# Patient Record
Sex: MALE | Race: BLACK OR AFRICAN AMERICAN | NOT HISPANIC OR LATINO | Employment: FULL TIME | URBAN - METROPOLITAN AREA
[De-identification: names, ages, dates, MRNs, and addresses within clinical notes are randomized per-mention and may not be internally consistent; named-entity substitution may affect disease eponyms.]

---

## 2023-04-20 LAB
APPEARANCE, URINE: ABNORMAL
BACTERIA, URINE: ABNORMAL /HPF
BILIRUBIN, URINE: NEGATIVE
BLOOD, URINE: ABNORMAL
COLOR, URINE: ABNORMAL
GLUCOSE, URINE: NEGATIVE MG/DL
KETONES, URINE: NEGATIVE MG/DL
LEUKOCYTE ESTERASE, URINE: ABNORMAL
MUCUS, URINE: ABNORMAL /LPF
NITRITE, URINE: POSITIVE
PH, URINE: 7 (ref 5–8)
PROTEIN, URINE: ABNORMAL MG/DL
RBC, URINE: 459 /HPF (ref 0–5)
SPECIFIC GRAVITY, URINE: 1.01 (ref 1–1.03)
UROBILINOGEN, URINE: <2 MG/DL (ref 0–1.9)
WBC CLUMPS, URINE: ABNORMAL /HPF
WBC, URINE: 426 /HPF (ref 0–5)

## 2023-04-22 LAB
URINE CULTURE: ABNORMAL
URINE CULTURE: ABNORMAL

## 2023-09-03 LAB
APPEARANCE, URINE: ABNORMAL
BILIRUBIN, URINE: NEGATIVE
BLOOD, URINE: ABNORMAL
COLOR, URINE: ABNORMAL
GLUCOSE, URINE: NEGATIVE MG/DL
KETONES, URINE: ABNORMAL MG/DL
LEUKOCYTE ESTERASE, URINE: ABNORMAL
NITRITE, URINE: POSITIVE
PH, URINE: 6 (ref 5–8)
PROTEIN, URINE: ABNORMAL MG/DL
RBC, URINE: ABNORMAL /HPF (ref 0–5)
SPECIFIC GRAVITY, URINE: 1.03 (ref 1–1.03)
UROBILINOGEN, URINE: 4 MG/DL (ref 0–1.9)
WBC, URINE: ABNORMAL /HPF (ref 0–5)

## 2023-10-04 PROBLEM — C61 PROSTATE CANCER (MULTI): Status: ACTIVE | Noted: 2023-10-04

## 2023-10-08 PROBLEM — N40.0 BPH (BENIGN PROSTATIC HYPERPLASIA): Status: ACTIVE | Noted: 2023-10-08

## 2023-10-08 PROBLEM — R31.9 HEMATURIA: Status: ACTIVE | Noted: 2023-10-08

## 2023-10-08 PROBLEM — Z97.8 CHRONIC INDWELLING FOLEY CATHETER: Status: ACTIVE | Noted: 2023-10-08

## 2023-10-08 PROBLEM — I10 HYPERTENSION: Status: ACTIVE | Noted: 2023-10-08

## 2023-10-08 PROBLEM — K62.5 RECTAL BLEEDING: Status: ACTIVE | Noted: 2023-10-08

## 2023-10-08 PROBLEM — R10.2 SUPRAPUBIC PAIN, ACUTE: Status: ACTIVE | Noted: 2023-10-08

## 2023-10-08 PROBLEM — R33.9 RETENTION OF URINE: Status: ACTIVE | Noted: 2023-10-08

## 2023-10-08 PROBLEM — T83.9XXA URINARY CATHETER COMPLICATION (CMS-HCC): Status: ACTIVE | Noted: 2023-10-08

## 2023-10-08 PROBLEM — R97.20 ELEVATED PSA: Status: ACTIVE | Noted: 2023-10-08

## 2023-10-08 PROBLEM — I10 BENIGN ESSENTIAL HYPERTENSION: Status: ACTIVE | Noted: 2023-10-08

## 2023-10-08 PROBLEM — R93.89 ABNORMAL MRI: Status: ACTIVE | Noted: 2023-10-08

## 2023-10-08 PROBLEM — R33.9 URINARY RETENTION WITH INCOMPLETE BLADDER EMPTYING: Status: ACTIVE | Noted: 2023-10-08

## 2023-10-08 PROBLEM — I48.92 ATRIAL FLUTTER (MULTI): Status: ACTIVE | Noted: 2023-10-08

## 2023-10-08 PROBLEM — R30.0 DYSURIA: Status: ACTIVE | Noted: 2023-10-08

## 2023-10-08 PROBLEM — R39.89 ABNORMAL PROSTATE EXAM: Status: ACTIVE | Noted: 2023-10-08

## 2023-10-08 RX ORDER — LISINOPRIL 10 MG/1
1 TABLET ORAL DAILY
COMMUNITY

## 2023-10-08 RX ORDER — FAMOTIDINE 20 MG/1
1 TABLET, FILM COATED ORAL DAILY
COMMUNITY
Start: 2023-09-03

## 2023-10-08 RX ORDER — AMLODIPINE BESYLATE 5 MG/1
1 TABLET ORAL DAILY
COMMUNITY

## 2023-10-08 RX ORDER — OMEGA-3 FATTY ACIDS/FISH OIL 340-1000MG
1 CAPSULE ORAL DAILY
COMMUNITY

## 2023-10-08 RX ORDER — SULFAMETHOXAZOLE AND TRIMETHOPRIM 800; 160 MG/1; MG/1
1 TABLET ORAL EVERY 12 HOURS
COMMUNITY
Start: 2022-09-20

## 2023-10-08 RX ORDER — OXYCODONE AND ACETAMINOPHEN 5; 325 MG/1; MG/1
1 TABLET ORAL EVERY 8 HOURS PRN
COMMUNITY
Start: 2022-10-10

## 2023-10-08 RX ORDER — SODIUM CHLORIDE 0.9 G/100ML
IRRIGANT IRRIGATION
COMMUNITY
Start: 2022-08-10

## 2023-10-08 RX ORDER — TAMSULOSIN HYDROCHLORIDE 0.4 MG/1
CAPSULE ORAL
COMMUNITY
Start: 2022-12-30 | End: 2024-03-15 | Stop reason: SDUPTHER

## 2023-10-08 RX ORDER — AMLODIPINE BESYLATE 10 MG/1
1 TABLET ORAL DAILY
COMMUNITY
Start: 2022-05-03

## 2023-10-08 RX ORDER — BICALUTAMIDE 50 MG/1
TABLET, FILM COATED ORAL
COMMUNITY

## 2023-10-08 RX ORDER — LANOLIN ALCOHOL/MO/W.PET/CERES
1 CREAM (GRAM) TOPICAL DAILY
COMMUNITY

## 2023-10-08 RX ORDER — DIPHENHYDRAMINE HCL 25 MG
25 TABLET ORAL EVERY 6 HOURS PRN
COMMUNITY
Start: 2023-09-03

## 2023-10-08 RX ORDER — LISINOPRIL 40 MG/1
1 TABLET ORAL DAILY
COMMUNITY
Start: 2022-05-03

## 2023-10-10 ENCOUNTER — TELEPHONE (OUTPATIENT)
Dept: HEMATOLOGY/ONCOLOGY | Facility: HOSPITAL | Age: 68
End: 2023-10-10
Payer: COMMERCIAL

## 2023-10-26 ENCOUNTER — TELEPHONE (OUTPATIENT)
Dept: RADIATION ONCOLOGY | Facility: HOSPITAL | Age: 68
End: 2023-10-26
Payer: COMMERCIAL

## 2023-10-26 NOTE — TELEPHONE ENCOUNTER
Called pt. to remind of appointment on 10/30/2023 at 1:00 pm with SG Alford. Pt's phone went to voicemail left number if needs to reschedule.

## 2024-01-11 ENCOUNTER — TELEPHONE (OUTPATIENT)
Dept: RADIATION ONCOLOGY | Facility: HOSPITAL | Age: 69
End: 2024-01-11
Payer: COMMERCIAL

## 2024-01-11 NOTE — TELEPHONE ENCOUNTER
Called pt. to remind of appointment on 1/16/2024 at 9:30 am with SG Alford.  Pt answered and confirmed appointment.

## 2024-01-12 ENCOUNTER — APPOINTMENT (OUTPATIENT)
Dept: RADIATION ONCOLOGY | Facility: HOSPITAL | Age: 69
End: 2024-01-12
Payer: COMMERCIAL

## 2024-01-16 ENCOUNTER — TELEPHONE (OUTPATIENT)
Dept: RADIATION ONCOLOGY | Facility: HOSPITAL | Age: 69
End: 2024-01-16
Payer: COMMERCIAL

## 2024-03-14 ENCOUNTER — TELEPHONE (OUTPATIENT)
Dept: RADIATION ONCOLOGY | Facility: HOSPITAL | Age: 69
End: 2024-03-14
Payer: COMMERCIAL

## 2024-03-14 NOTE — PROGRESS NOTES
Cancer synopsis:  Rad/onc: Dr. Barrett/ Rocío CNP  Urologist: Valeria Mireles    68 -year-old male with node positive prostate cancer, cT3b cN1 cM1a, stage IVB [place] score 4+5=9 (Grade  group 5), iPSA 40.78.   He initially presented to elevated PSA at [facility]. He underwent a biopsy which demonstrated Tommy score 4+5=9, MRI of the prostate showed + YANE, + SV involvement, and + pelvic lymph node adenopathy.  His bone scan was negative for osseous metastases.   PSMA PET/CT demonstrated disease in the prostate, pelvic and para-aortic LAD.  The patient has been under the care of medical oncology who initiated bicalutamide on 4/18/2022 with initiation of ADT on 4/26/2022 with a 3-month injection.  The patient  has discussed previously escalation with abiraterone/prednisone.  The patient presents today for discussion of consolidative radiation therapy after response to ADT.  PSA trend:  4/18/2022-52.69  5/2/2022-11.84  5/16/2022-2.89  7/18/2022-0.84  Testosterone level:  7/18/2022-less than 60     VMAT to prostate w/ prostate boost and pelvis completed 08/31/2022. Currently on ADT every 3 months for 2 years.    History of presenting illness:    Patient ID: [id number]     Paul Bruno is a 68 y.o. male who presents for his locally advance high risk prostate cancer GG5, IPSA 40.78 cT3b cN1 cM1a now s/p RT and on lng-term lupron.    RT Site: prostate, pelvis  RT Date: 08/31/2022  Hormone therapy: yes, lng term ADT started 04/18/2022 (patient stopped hormonal therapy on his own  8m last injection over)  -Hot Flushes: Admits to occasional hot flush that has been improving since stopping hormonal therapy.  Fatigue: Denies  Bone pain: Denies  KEVIN: n/a virtual  IPSS: n/a virtual  Urinary symptoms: Denies recent episodes of hematuria or clots. Reports able to urinate throughout the day however at night occasionally has to straight catheterize. Did meet with urology and was offered Holep for urinary stricture. Did have about a  40 percent chance  of becoming incontinent and declined. Denies dysuria, urine leakage. Does report weak stream and incomplete bladder emptying. Had been using flowmax in the past but has stopped since stopping with urology appointments.  Urinary Medications: No  Rectal bleeding: Denies  Other systems: Denies      Review of systems:  Review of Systems   Constitutional:  Negative for fatigue, fever and unexpected weight change.   Respiratory:  Negative for cough, chest tightness and shortness of breath.    Cardiovascular:  Negative for chest pain, palpitations and leg swelling.   Gastrointestinal:  Negative for abdominal pain, anal bleeding, blood in stool, constipation, diarrhea and rectal pain.   Endocrine: Negative for cold intolerance, heat intolerance and polyuria.   Genitourinary:  Positive for difficulty urinating. Negative for decreased urine volume, dysuria, frequency, hematuria and urgency.   Musculoskeletal:  Negative for arthralgias, back pain, gait problem and joint swelling.   Skin: Negative.    Allergic/Immunologic: Negative.    Neurological:  Negative for dizziness, syncope and weakness.   Psychiatric/Behavioral: Negative.         Past Medical history  No past medical history on file.     Surgical/family history  Family History   Problem Relation Name Age of Onset    Hypertension Other grandparent       Past Surgical History:   Procedure Laterality Date    OTHER SURGICAL HISTORY  07/27/2022    Prostate needle biopsy        Social History  Tobacco Use: High Risk (10/30/2023)    Patient History     Smoking Tobacco Use: Every Day     Smokeless Tobacco Use: Never     Passive Exposure: Not on file         Current med list:  Current Outpatient Medications   Medication Instructions    amLODIPine (Norvasc) 10 mg tablet 1 tablet, oral, Daily    amLODIPine (Norvasc) 5 mg tablet 1 tablet, oral, Daily    ascorbic acid, vitamin C, 1,000 mg ER tablet 1 tablet, oral, Daily    bicalutamide (Casodex) 50 mg tablet  Bicalutamide 50 MG Oral Tablet  Refills: 0    cyanocobalamin (Vitamin B-12) 1,000 mcg tablet 1 tablet, oral, Daily    diphenhydrAMINE (BENADRYL ALLERGY) 25 mg, oral, Every 6 hours PRN    famotidine (Pepcid) 20 mg tablet 1 tablet, oral, Daily    lisinopril 10 mg tablet 1 tablet, oral, Daily    lisinopril 40 mg tablet 1 tablet, oral, Daily    omega-3 fatty acids-fish oil (Fish OiL) 340-1,000 mg capsule 1 capsule, oral, Daily    oxyCODONE-acetaminophen (Percocet) 5-325 mg tablet 1 tablet, oral, Every 8 hours PRN,  as needed cancer associated pain g89.3<BR>    sodium chloride 0.9 % irrigation solution  USE AS DIRECTED.<BR>    sulfamethoxazole-trimethoprim (Bactrim DS) 800-160 mg tablet 1 tablet, oral, Every 12 hours    tamsulosin (Flomax) 0.4 mg 24 hr capsule Take 1 capsule daily by mouth.    TURMERIC ORAL 1,000 mg, oral, Daily    vitamin E acetate (VITAMIN E ORAL) 1 capsule, oral, Daily, vitamin E 180 mg oral capsule        Last recorded vital:  N/a virtual    Physical exam  N/a virtual    Pertinent labs:  PSA   Date/Time Value Ref Range Status   03/28/2023 10:07 AM 0.23 0.00 - 4.00 ng/mL Final     Comment:     The FDA requires that the method used for PSA assay be   reported to the physician. Values obtained with different   assay methods must not be used interchangeably. This test   was performed at [facility] using the Siemens  Opiatalk PSA method, which is a sandwich immunoassay using   chemiluminescence for quantitation. The assay is approved  for measurement of prostate-specific antigen (PSA) in   serum and may be used in conjunction with a digital rectal  examination in men 50 years and older as an aid in   detection of prostate cancer.   5-Alpha-reductase inhibitors (e.g. Proscar, Finasteride,   Avodart, Dutasteride and Isabell) for the treatment of BPH   have been shown to lower PSA levels by an average of 50%   after 6 months of treatment.           Dx:  Problem List Items Addressed This Visit   "  None  Visit Diagnoses       Malignant neoplasm of prostate (CMS/HCC)    -  Primary    Relevant Orders    PSA, Total and Free    Testosterone    Comprehensive Metabolic Panel    CBC and Auto Differential    Clinic Appointment Request Virtual Est; [facility] (virtual); [facility] [address] [facility] (virtual)    Weak urinary stream        Relevant Medications    tamsulosin (Flomax) 0.4 mg 24 hr capsule           Reviewed need for labs today. Review of latent SE including rectal bleeding, hematuria, urinary strictures, ED where reviewed as well as how to contact office if s/s present. Does report ongoing weak stream and some incomplete bladder emptying latent SE. NCCN guidelines where reviewed and routine FUV of every 3m for first year and every 6m for four years for a total of five years was discussed. Patient verbalized understanding.      Patient has stopped hormonal therapy. Reports stopping after last visit with med/onc. States he \"just stopped going\". Has not had any lab since then. Has also not seen urology or taking flowmax for urinary symptoms but straight catheterizing instead most evenings. Discussed importance for high risk prostate cancer for lng-term hormonal therapy based on current evidence showing improved disease free progression and lower recurrence rates. Declined FUV with med/onc to discuss further. Discussed at minimum obtaining PSA and Testerone every 3m to evaluate for disease recurrence.     Reviewed ongoing urinary stream related issues, discussed how flowmax can increase stream for males being effected by urinary strictures and BPH symptoms therefore potential reducing the frequency and need to straight cath. Did review red flag symptom of obstruction and need to report to ED if this occurs.    PLAN:  FUV 3m (depending on what is going on with med/onc and lupron?)  Labs Due for CBC/CMP/Psa and testerone  Imaging none  Flowmax (sent to [facility] in [place])  FUV other providers: PCP for routine " evals        Please contact office with any concerns:  Modesto Perry CNP  [phone]    I performed this visit using realtime telehealth tools, including an audio/video OR telephone connection between  patient’s name and location Paul Bruno and Modesto Alford CNP.      1. POS 02: Telehealth provided other than in patient's home.  o Patient is not located in their home when receiving health services or health  related services through telecommunication technology.

## 2024-03-14 NOTE — TELEPHONE ENCOUNTER
Called pt. to remind of appointment on 3/15/2024 at 9:30 am with SG Alford.  Pt answered and confirmed appointment.

## 2024-03-15 ENCOUNTER — HOSPITAL ENCOUNTER (OUTPATIENT)
Dept: RADIATION ONCOLOGY | Facility: HOSPITAL | Age: 69
Setting detail: RADIATION/ONCOLOGY SERIES
Discharge: HOME | End: 2024-03-15
Payer: COMMERCIAL

## 2024-03-15 DIAGNOSIS — R39.12 WEAK URINARY STREAM: ICD-10-CM

## 2024-03-15 DIAGNOSIS — R33.9 URINARY RETENTION WITH INCOMPLETE BLADDER EMPTYING: Primary | ICD-10-CM

## 2024-03-15 DIAGNOSIS — C61 MALIGNANT NEOPLASM OF PROSTATE (MULTI): ICD-10-CM

## 2024-03-15 PROCEDURE — 99214 OFFICE O/P EST MOD 30 MIN: CPT

## 2024-03-15 RX ORDER — TAMSULOSIN HYDROCHLORIDE 0.4 MG/1
CAPSULE ORAL
Qty: 30 CAPSULE | Refills: 3 | Status: SHIPPED | OUTPATIENT
Start: 2024-03-15

## 2024-03-15 ASSESSMENT — ENCOUNTER SYMPTOMS
COUGH: 0
DIARRHEA: 0
FATIGUE: 0
ANAL BLEEDING: 0
DIFFICULTY URINATING: 1
UNEXPECTED WEIGHT CHANGE: 0
BACK PAIN: 0
DYSURIA: 0
WEAKNESS: 0
FEVER: 0
BLOOD IN STOOL: 0
ABDOMINAL PAIN: 0
PSYCHIATRIC NEGATIVE: 1
SHORTNESS OF BREATH: 0
CONSTIPATION: 0
PALPITATIONS: 0
JOINT SWELLING: 0
FREQUENCY: 0
ARTHRALGIAS: 0
HEMATURIA: 0
CHEST TIGHTNESS: 0
ALLERGIC/IMMUNOLOGIC NEGATIVE: 1
RECTAL PAIN: 0
DIZZINESS: 0

## 2024-03-18 NOTE — ADDENDUM NOTE
Encounter addended by: JAKI Mcneill-SG on: 3/18/2024 12:48 PM   Actions taken: Visit diagnoses modified, Order list changed, Diagnosis association updated

## 2024-03-25 ENCOUNTER — TELEPHONE (OUTPATIENT)
Dept: HEMATOLOGY/ONCOLOGY | Facility: HOSPITAL | Age: 69
End: 2024-03-25
Payer: COMMERCIAL

## 2024-06-10 ENCOUNTER — LAB (OUTPATIENT)
Dept: LAB | Facility: HOSPITAL | Age: 69
End: 2024-06-10
Payer: COMMERCIAL

## 2024-06-10 DIAGNOSIS — C61 MALIGNANT NEOPLASM OF PROSTATE (MULTI): ICD-10-CM

## 2024-06-10 DIAGNOSIS — C61 PROSTATE CANCER (MULTI): ICD-10-CM

## 2024-06-10 DIAGNOSIS — C61 PROSTATE CANCER (MULTI): Primary | ICD-10-CM

## 2024-06-10 LAB
ALBUMIN SERPL BCP-MCNC: 4.3 G/DL (ref 3.4–5)
ALP SERPL-CCNC: 71 U/L (ref 33–136)
ALT SERPL W P-5'-P-CCNC: 14 U/L (ref 10–52)
ANION GAP SERPL CALC-SCNC: 12 MMOL/L (ref 10–20)
AST SERPL W P-5'-P-CCNC: 16 U/L (ref 9–39)
BASOPHILS # BLD AUTO: 0.09 X10*3/UL (ref 0–0.1)
BASOPHILS NFR BLD AUTO: 0.9 %
BILIRUB SERPL-MCNC: 0.5 MG/DL (ref 0–1.2)
BUN SERPL-MCNC: 12 MG/DL (ref 6–23)
CALCIUM SERPL-MCNC: 10.1 MG/DL (ref 8.6–10.3)
CHLORIDE SERPL-SCNC: 104 MMOL/L (ref 98–107)
CO2 SERPL-SCNC: 29 MMOL/L (ref 21–32)
CREAT SERPL-MCNC: 0.87 MG/DL (ref 0.5–1.3)
EGFRCR SERPLBLD CKD-EPI 2021: >90 ML/MIN/1.73M*2
EOSINOPHIL # BLD AUTO: 0.49 X10*3/UL (ref 0–0.4)
EOSINOPHIL NFR BLD AUTO: 4.7 %
ERYTHROCYTE [DISTWIDTH] IN BLOOD BY AUTOMATED COUNT: 15.3 % (ref 11.5–14.5)
GLUCOSE SERPL-MCNC: 99 MG/DL (ref 74–99)
HCT VFR BLD AUTO: 40.2 % (ref 41–52)
HGB BLD-MCNC: 13.2 G/DL (ref 13.5–17.5)
IMM GRANULOCYTES # BLD AUTO: 0.03 X10*3/UL (ref 0–0.5)
IMM GRANULOCYTES NFR BLD AUTO: 0.3 % (ref 0–0.9)
LYMPHOCYTES # BLD AUTO: 2.64 X10*3/UL (ref 0.8–3)
LYMPHOCYTES NFR BLD AUTO: 25.1 %
MCH RBC QN AUTO: 25 PG (ref 26–34)
MCHC RBC AUTO-ENTMCNC: 32.8 G/DL (ref 32–36)
MCV RBC AUTO: 76 FL (ref 80–100)
MONOCYTES # BLD AUTO: 0.58 X10*3/UL (ref 0.05–0.8)
MONOCYTES NFR BLD AUTO: 5.5 %
NEUTROPHILS # BLD AUTO: 6.69 X10*3/UL (ref 1.6–5.5)
NEUTROPHILS NFR BLD AUTO: 63.5 %
NRBC BLD-RTO: 0 /100 WBCS (ref 0–0)
PLATELET # BLD AUTO: 466 X10*3/UL (ref 150–450)
POTASSIUM SERPL-SCNC: 4.5 MMOL/L (ref 3.5–5.3)
PROT SERPL-MCNC: 8.1 G/DL (ref 6.4–8.2)
RBC # BLD AUTO: 5.29 X10*6/UL (ref 4.5–5.9)
SODIUM SERPL-SCNC: 140 MMOL/L (ref 136–145)
TESTOST SERPL-MCNC: 265 NG/DL (ref 240–1000)
WBC # BLD AUTO: 10.5 X10*3/UL (ref 4.4–11.3)

## 2024-06-10 PROCEDURE — 85025 COMPLETE CBC W/AUTO DIFF WBC: CPT

## 2024-06-10 PROCEDURE — 84154 ASSAY OF PSA FREE: CPT

## 2024-06-10 PROCEDURE — 84403 ASSAY OF TOTAL TESTOSTERONE: CPT

## 2024-06-10 PROCEDURE — 36415 COLL VENOUS BLD VENIPUNCTURE: CPT

## 2024-06-10 PROCEDURE — 80053 COMPREHEN METABOLIC PANEL: CPT

## 2024-06-12 ENCOUNTER — TELEPHONE (OUTPATIENT)
Dept: RADIATION ONCOLOGY | Facility: HOSPITAL | Age: 69
End: 2024-06-12
Payer: COMMERCIAL

## 2024-06-12 LAB
PSA FREE MFR SERPL: 24 %
PSA FREE SERPL-MCNC: 2.5 NG/ML
PSA SERPL IA-MCNC: 10.5 NG/ML (ref 0–4)

## 2024-06-12 NOTE — TELEPHONE ENCOUNTER
Called pt. to remind of appointment on 6/14/2024 at 1:20 pm with SG Alford. Pt's phone went to voicemail left number if needs to reschedule.

## 2024-06-13 ASSESSMENT — ENCOUNTER SYMPTOMS
RECTAL PAIN: 0
FREQUENCY: 0
SHORTNESS OF BREATH: 0
PALPITATIONS: 0
ABDOMINAL PAIN: 0
UNEXPECTED WEIGHT CHANGE: 0
DYSURIA: 0
FEVER: 0
WEAKNESS: 0
JOINT SWELLING: 0
BACK PAIN: 0
DIARRHEA: 0
HEMATURIA: 0
FATIGUE: 0
PSYCHIATRIC NEGATIVE: 1
COUGH: 0
DIZZINESS: 0
CHEST TIGHTNESS: 0
ARTHRALGIAS: 0
BLOOD IN STOOL: 0
DIFFICULTY URINATING: 1
ALLERGIC/IMMUNOLOGIC NEGATIVE: 1
CONSTIPATION: 0
ANAL BLEEDING: 0

## 2024-06-13 NOTE — PROGRESS NOTES
Cancer synopsis:  Rad/onc: Dr. Barrett/ Rocío CNP  Urologist: Valeria Mireles    68 -year-old male with node positive prostate cancer, cT3b cN1 cM1a, stage IVB [place] score 4+5=9 (Grade  group 5), iPSA 40.78.   He initially presented to elevated PSA at [facility]. He underwent a biopsy which demonstrated Tommy score 4+5=9, MRI of the prostate showed + YANE, + SV involvement, and + pelvic lymph node adenopathy.  His bone scan was negative for osseous metastases.   PSMA PET/CT demonstrated disease in the prostate, pelvic and para-aortic LAD.  The patient has been under the care of medical oncology who initiated bicalutamide on 4/18/2022 with initiation of ADT on 4/26/2022 with a 3-month injection.  The patient  has discussed previously escalation with abiraterone/prednisone.  The patient presents today for discussion of consolidative radiation therapy after response to ADT.  PSA trend:  4/18/2022-52.69  5/2/2022-11.84  5/16/2022-2.89  7/18/2022-0.84  Testosterone level:  7/18/2022-less than 60     VMAT to prostate w/ prostate boost and pelvis completed 08/31/2022. Currently on ADT every 3 months for 2 years.    History of presenting illness:    Patient ID: [id number]     Paul Bruno is a 69 y.o. male who presents for his locally advance high risk prostate cancer GG5, IPSA 40.78 cT3b cN1 cM1a now s/p RT and on lng-term lupron.    RT Site: prostate, pelvis  RT Date: 08/31/2022  Hormone therapy: yes, lng term ADT started 04/18/2022 (patient stopped hormonal therapy on his own  8m last injection over)  -Hot Flushes: Admits to occasional hot flush that has been improving since stopping hormonal therapy.  Fatigue: Denies  Bone pain: Denies  KEVIN: n/a virtual  IPSS: n/a virtual  Urinary symptoms: Denies hematuria urgency or urine leakage. Denies dysuria, urine leakage. Does report weak stream and incomplete bladder emptying. Since starting flomax no longer has needed to catheterize self.  Urinary Medications: flomax  Rectal  bleeding: Denies  Colonoscopy: None on file  Other systems: Denies      Review of systems:  Review of Systems   Constitutional:  Negative for fatigue, fever and unexpected weight change.   Respiratory:  Negative for cough, chest tightness and shortness of breath.    Cardiovascular:  Negative for chest pain, palpitations and leg swelling.   Gastrointestinal:  Negative for abdominal pain, anal bleeding, blood in stool, constipation, diarrhea and rectal pain.   Endocrine: Negative for cold intolerance, heat intolerance and polyuria.   Genitourinary:  Positive for difficulty urinating. Negative for decreased urine volume, dysuria, frequency, hematuria and urgency.   Musculoskeletal:  Negative for arthralgias, back pain, gait problem and joint swelling.   Skin: Negative.    Allergic/Immunologic: Negative.    Neurological:  Negative for dizziness, syncope and weakness.   Psychiatric/Behavioral: Negative.         Past Medical history  No past medical history on file.     Surgical/family history  Family History   Problem Relation Name Age of Onset    Hypertension Other grandparent       Past Surgical History:   Procedure Laterality Date    OTHER SURGICAL HISTORY  07/27/2022    Prostate needle biopsy        Social History  Tobacco Use: High Risk (10/30/2023)    Patient History     Smoking Tobacco Use: Every Day     Smokeless Tobacco Use: Never     Passive Exposure: Not on file         Current med list:  Current Outpatient Medications   Medication Instructions    amLODIPine (Norvasc) 10 mg tablet 1 tablet, oral, Daily    amLODIPine (Norvasc) 5 mg tablet 1 tablet, oral, Daily    ascorbic acid, vitamin C, 1,000 mg ER tablet 1 tablet, oral, Daily    bicalutamide (Casodex) 50 mg tablet Bicalutamide 50 MG Oral Tablet  Refills: 0    cyanocobalamin (Vitamin B-12) 1,000 mcg tablet 1 tablet, oral, Daily    diphenhydrAMINE (BENADRYL ALLERGY) 25 mg, oral, Every 6 hours PRN    famotidine (Pepcid) 20 mg tablet 1 tablet, oral, Daily     lisinopril 10 mg tablet 1 tablet, oral, Daily    lisinopril 40 mg tablet 1 tablet, oral, Daily    omega-3 fatty acids-fish oil (Fish OiL) 340-1,000 mg capsule 1 capsule, oral, Daily    oxyCODONE-acetaminophen (Percocet) 5-325 mg tablet 1 tablet, oral, Every 8 hours PRN,  as needed cancer associated pain g89.3<BR>    sodium chloride 0.9 % irrigation solution  USE AS DIRECTED.<BR>    sulfamethoxazole-trimethoprim (Bactrim DS) 800-160 mg tablet 1 tablet, oral, Every 12 hours    tamsulosin (Flomax) 0.4 mg 24 hr capsule Take 1 capsule daily by mouth.    TURMERIC ORAL 1,000 mg, oral, Daily    vitamin E acetate (VITAMIN E ORAL) 1 capsule, oral, Daily, vitamin E 180 mg oral capsule        Last recorded vital:  N/a virtual    Physical exam  N/a virtual    Pertinent labs:  PSA   Date/Time Value Ref Range Status   06/10/2024 09:11 AM 10.5 (H) 0.0 - 4.0 ng/mL Final     Comment:     INTERPRETIVE INFORMATION: Prostate Specific Antigen    The Roche PSA electrochemiluminescent immunoassay is used. Results   obtained with different test methods or kits cannot be used   interchangeably. The Roche PSA method is approved for use as an   aid in the detection of prostate cancer when used in conjunction   with a digital rectal exam in individuals with a prostate age 50   years and older. The Roche PSA is also indicated for the serial   measurement of PSA to aid in the prognosis and management of   prostate cancer patients. Elevated PSA concentrations can only   suggest the presence of prostate cancer until biopsy is performed.   PSA concentrations can also be elevated in benign prostatic   hyperplasia or inflammatory conditions of the prostate. PSA is   generally not elevated in healthy individuals or individuals with   nonprostatic carcinoma.         Dx:  Problem List Items Addressed This Visit       Prostate cancer (Multi) - Primary     Other Visit Diagnoses       Malignant neoplasm of prostate (Multi)        Relevant Orders    Clinic  Appointment Request Virtual Est; [facility] (virtual); [facility] [address] [facility] (virtual) (Completed)        PSA has vasty risen with return of Testerone now 10.5 from 0.23. Discussed indication of continued disease and need for PET imaging as well as FUV with med/onc and rad/onc attending to review results and form plan tailored to imaging results. (Of not patient stopped hormonal therapy early without discussing or notifying med/onc)     Review of latent SE including rectal bleeding, hematuria, urinary strictures, ED where reviewed as well as how to contact office if s/s present. Does report ongoing weak stream and some incomplete bladder emptying latent SE. NCCN guidelines where reviewed and routine FUV of every 3m for first year and every 6m for four years for a total of five years was discussed. Patient verbalized understanding.      Reviewed continued flowmax usage will maintain. Refills sent.    Smoking cessation: declined    PLAN:  FUV PET imaging, will call with results  Labs Due for CBC/CMP/Psa and testerone  Imaging PET imaging  [facility] ([facility] in [place])  FUV other providers: PCP for routine evals    Please contact office with any concerns:  Modesto Perry CNP  [phone]    I performed this visit using realtime telehealth tools, including an audio/video OR telephone connection between  patient’s name and location Paul Bruno and Modesto Beckwith CNP.    1. POS 02: Telehealth provided other than in patient's home.  o Patient is not located in their home when receiving health services or health  related services through telecommunication technology.

## 2024-06-14 ENCOUNTER — APPOINTMENT (OUTPATIENT)
Dept: HEMATOLOGY/ONCOLOGY | Facility: HOSPITAL | Age: 69
End: 2024-06-14
Payer: COMMERCIAL

## 2024-06-14 ENCOUNTER — HOSPITAL ENCOUNTER (OUTPATIENT)
Dept: RADIATION ONCOLOGY | Facility: HOSPITAL | Age: 69
Setting detail: RADIATION/ONCOLOGY SERIES
Discharge: HOME | End: 2024-06-14
Payer: COMMERCIAL

## 2024-06-14 DIAGNOSIS — R39.12 WEAK URINARY STREAM: ICD-10-CM

## 2024-06-14 DIAGNOSIS — C61 MALIGNANT NEOPLASM OF PROSTATE (MULTI): ICD-10-CM

## 2024-06-14 DIAGNOSIS — C61 PROSTATE CANCER (MULTI): Primary | ICD-10-CM

## 2024-06-14 PROCEDURE — 99213 OFFICE O/P EST LOW 20 MIN: CPT

## 2024-06-14 RX ORDER — TAMSULOSIN HYDROCHLORIDE 0.4 MG/1
CAPSULE ORAL
Qty: 30 CAPSULE | Refills: 3 | Status: SHIPPED | OUTPATIENT
Start: 2024-06-14

## 2024-06-17 ENCOUNTER — HOSPITAL ENCOUNTER (OUTPATIENT)
Dept: RADIOLOGY | Facility: HOSPITAL | Age: 69
Discharge: HOME | End: 2024-06-17
Payer: COMMERCIAL

## 2024-06-17 DIAGNOSIS — C61 PROSTATE CANCER (MULTI): ICD-10-CM

## 2024-06-17 PROCEDURE — A9596 HC RX 343 DIAGNOSTIC RADIOPHARMACEUTICALS: HCPCS | Mod: TB

## 2024-06-17 PROCEDURE — 3430000001 HC RX 343 DIAGNOSTIC RADIOPHARMACEUTICALS: Mod: TB

## 2024-06-17 PROCEDURE — 78815 PET IMAGE W/CT SKULL-THIGH: CPT | Mod: PET TUMOR SUBSQ TX STRATEGY | Performed by: NUCLEAR MEDICINE

## 2024-06-17 PROCEDURE — 78815 PET IMAGE W/CT SKULL-THIGH: CPT | Mod: PS

## 2024-06-19 ENCOUNTER — TELEPHONE (OUTPATIENT)
Dept: HEMATOLOGY/ONCOLOGY | Facility: CLINIC | Age: 69
End: 2024-06-19
Payer: COMMERCIAL

## 2024-06-19 NOTE — TELEPHONE ENCOUNTER
Patient has been on consistent and lost to follow-up and stopped taking ADT for many months.  Received a message from radiation oncology team rising PSA and PET scan found to have recurrent disease in the prostate new disease in the lungs and potential metastatic implants within the penis.  Multiple messages have been left with the patient and the best contact to discuss next steps and need for follow up with the medical oncology team re intervention. Canceled and no show for multiple appointments.  Will attempt to continue to attempt to get in touch to see if interested in follow up with our clinic.

## 2024-06-20 ENCOUNTER — TELEPHONE (OUTPATIENT)
Dept: RADIATION ONCOLOGY | Facility: HOSPITAL | Age: 69
End: 2024-06-20
Payer: COMMERCIAL

## 2024-06-20 ENCOUNTER — TELEPHONE (OUTPATIENT)
Dept: HEMATOLOGY/ONCOLOGY | Facility: HOSPITAL | Age: 69
End: 2024-06-20
Payer: COMMERCIAL

## 2024-06-20 NOTE — TELEPHONE ENCOUNTER
Called patient and reviewed most recent PET imaging. Discussed indication of disease recurrence in prostate and in multiple lung nodules vs new primary. Discussed various treatment options but back bone of therapy being hormonal therapy combined with RT. Was amendable to meet with med/onc. Is scheduled for 06/28/2024 as this was first available date for patient to come in. Discussed treatment with RT would like to consult with Dr. HARRINGTON prior treating attending. Staff is aware and working to arrange date.

## 2024-06-24 ENCOUNTER — TELEPHONE (OUTPATIENT)
Dept: RADIATION ONCOLOGY | Facility: CLINIC | Age: 69
End: 2024-06-24
Payer: COMMERCIAL

## 2024-06-24 ENCOUNTER — TELEPHONE (OUTPATIENT)
Dept: RADIATION ONCOLOGY | Facility: HOSPITAL | Age: 69
End: 2024-06-24
Payer: COMMERCIAL

## 2024-06-24 NOTE — TELEPHONE ENCOUNTER
This nurse called patient as he was a no show for his consult for radiation therapy today with Dr. Barrett. Left a message with a reminder for Friday's apt with Dr. Mehta and a call back number to reschedule with Rad/Onc.

## 2024-06-24 NOTE — TELEPHONE ENCOUNTER
Called pt several times last week and left VM again same today.  His significant other also has not been answering.  Pt missed NPV appt today per Modesto he has development of disease recurrence at his prostate and new mets over the course of 3m (very rapid).  If unable to reach will try having the hematology office for his appt later this week notify him.   Please advise.

## 2024-06-24 NOTE — TELEPHONE ENCOUNTER
Thank you Modesto.  Unfortunately [employer] messages aren't retained more than 48 hours   So I forgot the part about Dr. Morrell and Dr. Martinez.  I will try to reach him again and r/s to Dr. Morrell  Thank you.

## 2024-06-26 NOTE — PROGRESS NOTES
Oncology Follow up Note    Cancer History  Prostate Cancer - locally advanced / clinical Stage DARY - lost to f/up concerning for met prostate cancer  EPE/christa mets  Treatment  -UTI , retention, cysto neg, PSA 40.78 3/22 - MRI prostate, bone scan   - -chronic daniel in place  -PET Gallium on study -  4/18/2022: started bicalutamide 30 days  4/25/2022: Biopsy reveals GG5 prostate cancer, GS 9  4/26/2022: Started ADT .  Seen by Rad onc , refused yovana/pred  -Started XRT end 9/2022 prostate and pelvis  -non compliant lost to follow up, and our clinic, last ADT 3/23  -rising PSA/ progression with mets to lung     Provider Team  No ref. provider found   MD [place]  PCP Soren Adame - Cardiology   Mark Barrett - rad onc    Other contributing history  microcytosis, Aflutter, urinary incontinence, uncontrolled HTN, ongoing issues with catheter / urinary tract issues     Interval history:  The patient presents for follow up.  The patient was accompanied by a family member.  Unfortunately he was having ongoing issues with complaints and difficulties working with our urology department and that is why he was lost to follow-up.  He is now interested in getting back on treatment.  He does note some issues with urinary frequency urgency but he has not required a Daniel catheter since going back on the Flomax has had some improvement of his urological symptoms but still having some issues at night where he does have decreased urinary flow.  He otherwise denies any nausea, vomiting, fevers, chills denies any recent follow-up with any of his primary care physicians.  He is not taking any of his other medications for his other health issues.    ROS:  10-pt ROS reviewed and negative except as mentioned above.    Medications: below was reviewed and is accurate  Current Outpatient Medications   Medication Instructions    ascorbic acid, vitamin C, 1,000 mg ER tablet 1 tablet,  oral, Daily    famotidine (Pepcid) 20 mg tablet 1 tablet, oral, Daily    tamsulosin (Flomax) 0.4 mg 24 hr capsule Take 1 capsule daily by mouth.    TURMERIC ORAL 1,000 mg, oral, Daily    vitamin E acetate (VITAMIN E ORAL) 1 capsule, oral, Daily, vitamin E 180 mg oral capsule        Detailed family history and social history reviewed in detail and updated per epic charting and in detail with the patient    Physical exam:  Vitals:    06/28/24 1105   BP: 166/89   BP Location: Left arm   Patient Position: Sitting   BP Cuff Size: Adult   Pulse: 78   Resp: 16   Temp: 36.4 °C (97.5 °F)   TempSrc: Temporal   SpO2: 95%   Weight: 66.6 kg (146 lb 14.4 oz)       GEN: NAD, well-appearing  HEENT: no clear lesions seen  NECK: no clear masses  LYMPH: no palpable adenopathy  SKIN: no concerning new lesions  LUNGS: CTA  CV: RRR no clear R/G  ABD: Soft, NTND. No rebound or guarding.  EXT:  trace edema bilaterally   NEURO: Grossly intact. No focal deficits.  PSYCH: Normal mood and affect    Radiology review  Reviewed in detail personally and for detail see results in EPIC  6/17/24 - focal increase in prostate gland, c/w recurrent disease, lung nodules SUV 8/ 4.7, 3.7, increased update in the penis, increased uptake within the penis , mets implant vs urinary retention      Genomics Data    Laboratory review  Reviewed in detail personally and for detail see results in EPIC  Lab Results   Component Value Date    WBC 10.5 06/10/2024    HGB 13.2 (L) 06/10/2024    HCT 40.2 (L) 06/10/2024    MCV 76 (L) 06/10/2024     (H) 06/10/2024     Lab Results   Component Value Date    GLUCOSE 99 06/10/2024    CALCIUM 10.1 06/10/2024     06/10/2024    K 4.5 06/10/2024    CO2 29 06/10/2024     06/10/2024    BUN 12 06/10/2024    CREATININE 0.87 06/10/2024     Lab Results   Component Value Date    PSA 10.5 (H) 06/10/2024    PSA 0.23 03/28/2023    PSA 0.35 01/03/2023     Lab Results   Component Value Date    ALT 14 06/10/2024    AST 16  06/10/2024    ALKPHOS 71 06/10/2024    BILITOT 0.5 06/10/2024     Lab Results   Component Value Date    TESTOSTERONE 265 06/10/2024       ASSESSMENT AND PLAN     Prostate Cancer -discussed in detail for both local and metastatic control concerning lung nodules discussed the pros and cons of biopsy discussed the pros and cons of further workup and for now he wanted to hold off on any biopsy discussed the backbone of treatment for advanced prostate cancer.  Discussed the role of ADT discussed the role of intensifying therapy with next-generation hormonal agents and chemotherapy also discussed the role of genomics we consider and he was not interested in proceeding with genomic sequencing at this juncture due to concern about cost.  Also discussed with him in detail about local control but concerned about worsening bladder and neurological symptoms he is scheduled to follow-up with radiation oncology also potential clinical trials.  For now he would just interested in starting on ADT we will give Casodex and then plan on ADT to be given in 10 days and follow-up in roughly 6 weeks with labs prior and continue to monitor for worsening symptoms.  He did agree to follow-up on a regular basis.  LUTS -in regards to referral back to urology some improvement to starting back on the Flomax discussed the potential role of also referral back to urology and was not interested at this juncture may have some improvement with started back on ADT if worsening urinary symptoms he will contact us  Microcytosis -could refer to hematology wanted to hold off for now  Did explain to him in detail the need to follow-up with his primary care physician and he will contact them for follow-up.  discussed need while on ADT  maintain adequate cardiovascular health, exercise, dietary modifications,  bone health with calcium 1000 mg with vitamin D 400-800 international units      Skip Mehta MD  Senior Attending Physician/ Assistant  Professor in Medicine [employer]  [facility] / [facility]  Patient line [phone]  Fax [phone]

## 2024-06-27 ENCOUNTER — TELEPHONE (OUTPATIENT)
Dept: RADIATION ONCOLOGY | Facility: HOSPITAL | Age: 69
End: 2024-06-27
Payer: COMMERCIAL

## 2024-06-27 NOTE — TELEPHONE ENCOUNTER
Called pt. to remind of appointment on 7/2/2024 at 2:00 pm with Dr. Morrell. Pt's phone went to voicemail left number if needs to reschedule

## 2024-06-28 ENCOUNTER — OFFICE VISIT (OUTPATIENT)
Dept: HEMATOLOGY/ONCOLOGY | Facility: HOSPITAL | Age: 69
End: 2024-06-28
Payer: COMMERCIAL

## 2024-06-28 VITALS
BODY MASS INDEX: 19.38 KG/M2 | HEART RATE: 78 BPM | DIASTOLIC BLOOD PRESSURE: 89 MMHG | OXYGEN SATURATION: 95 % | WEIGHT: 146.9 LBS | SYSTOLIC BLOOD PRESSURE: 166 MMHG | TEMPERATURE: 97.5 F | RESPIRATION RATE: 16 BRPM

## 2024-06-28 DIAGNOSIS — C61 PROSTATE CANCER (MULTI): Primary | ICD-10-CM

## 2024-06-28 PROCEDURE — 3079F DIAST BP 80-89 MM HG: CPT | Performed by: INTERNAL MEDICINE

## 2024-06-28 PROCEDURE — 1159F MED LIST DOCD IN RCRD: CPT | Performed by: INTERNAL MEDICINE

## 2024-06-28 PROCEDURE — 99215 OFFICE O/P EST HI 40 MIN: CPT | Performed by: INTERNAL MEDICINE

## 2024-06-28 PROCEDURE — 3077F SYST BP >= 140 MM HG: CPT | Performed by: INTERNAL MEDICINE

## 2024-06-28 PROCEDURE — 1160F RVW MEDS BY RX/DR IN RCRD: CPT | Performed by: INTERNAL MEDICINE

## 2024-06-28 PROCEDURE — 1126F AMNT PAIN NOTED NONE PRSNT: CPT | Performed by: INTERNAL MEDICINE

## 2024-06-28 RX ORDER — HEPARIN SODIUM,PORCINE/PF 10 UNIT/ML
50 SYRINGE (ML) INTRAVENOUS AS NEEDED
OUTPATIENT
Start: 2024-06-28

## 2024-06-28 RX ORDER — FAMOTIDINE 10 MG/ML
20 INJECTION INTRAVENOUS ONCE AS NEEDED
OUTPATIENT
Start: 2024-07-10

## 2024-06-28 RX ORDER — ALBUTEROL SULFATE 0.83 MG/ML
3 SOLUTION RESPIRATORY (INHALATION) AS NEEDED
OUTPATIENT
Start: 2024-07-10

## 2024-06-28 RX ORDER — HEPARIN 100 UNIT/ML
500 SYRINGE INTRAVENOUS AS NEEDED
OUTPATIENT
Start: 2024-06-28

## 2024-06-28 RX ORDER — EPINEPHRINE 0.3 MG/.3ML
0.3 INJECTION SUBCUTANEOUS EVERY 5 MIN PRN
OUTPATIENT
Start: 2024-07-10

## 2024-06-28 RX ORDER — DIPHENHYDRAMINE HYDROCHLORIDE 50 MG/ML
50 INJECTION INTRAMUSCULAR; INTRAVENOUS AS NEEDED
OUTPATIENT
Start: 2024-07-10

## 2024-06-28 RX ORDER — BICALUTAMIDE 50 MG/1
50 TABLET, FILM COATED ORAL DAILY
Qty: 30 TABLET | Refills: 0 | Status: SHIPPED | OUTPATIENT
Start: 2024-06-28 | End: 2024-07-28

## 2024-06-28 ASSESSMENT — PAIN SCALES - GENERAL: PAINLEVEL: 0-NO PAIN

## 2024-07-05 ENCOUNTER — TELEPHONE (OUTPATIENT)
Dept: HEMATOLOGY/ONCOLOGY | Facility: HOSPITAL | Age: 69
End: 2024-07-05
Payer: COMMERCIAL

## 2024-07-10 ENCOUNTER — INFUSION (OUTPATIENT)
Dept: HEMATOLOGY/ONCOLOGY | Facility: HOSPITAL | Age: 69
End: 2024-07-10
Payer: COMMERCIAL

## 2024-07-10 VITALS
WEIGHT: 144 LBS | OXYGEN SATURATION: 96 % | HEART RATE: 70 BPM | DIASTOLIC BLOOD PRESSURE: 79 MMHG | RESPIRATION RATE: 18 BRPM | BODY MASS INDEX: 19 KG/M2 | TEMPERATURE: 97.9 F | SYSTOLIC BLOOD PRESSURE: 148 MMHG

## 2024-07-10 DIAGNOSIS — C61 PROSTATE CANCER (MULTI): ICD-10-CM

## 2024-07-10 PROCEDURE — 96402 CHEMO HORMON ANTINEOPL SQ/IM: CPT

## 2024-07-10 PROCEDURE — 2500000004 HC RX 250 GENERAL PHARMACY W/ HCPCS (ALT 636 FOR OP/ED): Mod: JZ | Performed by: INTERNAL MEDICINE

## 2024-07-10 RX ORDER — DIPHENHYDRAMINE HYDROCHLORIDE 50 MG/ML
50 INJECTION INTRAMUSCULAR; INTRAVENOUS AS NEEDED
OUTPATIENT
Start: 2024-10-02

## 2024-07-10 RX ORDER — FAMOTIDINE 10 MG/ML
20 INJECTION INTRAVENOUS ONCE AS NEEDED
OUTPATIENT
Start: 2024-10-02

## 2024-07-10 RX ORDER — EPINEPHRINE 0.3 MG/.3ML
0.3 INJECTION SUBCUTANEOUS EVERY 5 MIN PRN
OUTPATIENT
Start: 2024-10-02

## 2024-07-10 RX ORDER — ALBUTEROL SULFATE 0.83 MG/ML
3 SOLUTION RESPIRATORY (INHALATION) AS NEEDED
OUTPATIENT
Start: 2024-10-02

## 2024-07-16 ENCOUNTER — HOSPITAL ENCOUNTER (OUTPATIENT)
Dept: RADIATION ONCOLOGY | Facility: HOSPITAL | Age: 69
Setting detail: RADIATION/ONCOLOGY SERIES
Discharge: HOME | End: 2024-07-16
Payer: COMMERCIAL

## 2024-07-16 VITALS
SYSTOLIC BLOOD PRESSURE: 158 MMHG | DIASTOLIC BLOOD PRESSURE: 87 MMHG | OXYGEN SATURATION: 95 % | BODY MASS INDEX: 19.12 KG/M2 | RESPIRATION RATE: 18 BRPM | HEART RATE: 74 BPM | WEIGHT: 144.3 LBS | TEMPERATURE: 97.9 F | HEIGHT: 73 IN

## 2024-07-16 DIAGNOSIS — C61 PROSTATE CANCER (MULTI): Primary | ICD-10-CM

## 2024-07-16 PROCEDURE — 99214 OFFICE O/P EST MOD 30 MIN: CPT | Performed by: STUDENT IN AN ORGANIZED HEALTH CARE EDUCATION/TRAINING PROGRAM

## 2024-07-16 PROCEDURE — 99214 OFFICE O/P EST MOD 30 MIN: CPT | Mod: GC | Performed by: STUDENT IN AN ORGANIZED HEALTH CARE EDUCATION/TRAINING PROGRAM

## 2024-07-16 SDOH — ECONOMIC STABILITY: FOOD INSECURITY: WITHIN THE PAST 12 MONTHS, YOU WORRIED THAT YOUR FOOD WOULD RUN OUT BEFORE YOU GOT MONEY TO BUY MORE.: NEVER TRUE

## 2024-07-16 SDOH — ECONOMIC STABILITY: FOOD INSECURITY: WITHIN THE PAST 12 MONTHS, THE FOOD YOU BOUGHT JUST DIDN'T LAST AND YOU DIDN'T HAVE MONEY TO GET MORE.: NEVER TRUE

## 2024-07-16 ASSESSMENT — ENCOUNTER SYMPTOMS
MUSCULOSKELETAL NEGATIVE: 1
CARDIOVASCULAR NEGATIVE: 1
DEPRESSION: 0
DIFFICULTY URINATING: 1
ENDOCRINE NEGATIVE: 1
FATIGUE: 1
DYSURIA: 1
LOSS OF SENSATION IN FEET: 0
RESPIRATORY NEGATIVE: 1
PSYCHIATRIC NEGATIVE: 1
GASTROINTESTINAL NEGATIVE: 1
OCCASIONAL FEELINGS OF UNSTEADINESS: 0
HEMATOLOGIC/LYMPHATIC NEGATIVE: 1
EYES NEGATIVE: 1
NUMBNESS: 1

## 2024-07-16 ASSESSMENT — PATIENT HEALTH QUESTIONNAIRE - PHQ9
2. FEELING DOWN, DEPRESSED OR HOPELESS: NOT AT ALL
1. LITTLE INTEREST OR PLEASURE IN DOING THINGS: NOT AT ALL
SUM OF ALL RESPONSES TO PHQ9 QUESTIONS 1 AND 2: 0

## 2024-07-16 ASSESSMENT — COLUMBIA-SUICIDE SEVERITY RATING SCALE - C-SSRS
2. HAVE YOU ACTUALLY HAD ANY THOUGHTS OF KILLING YOURSELF?: NO
6. HAVE YOU EVER DONE ANYTHING, STARTED TO DO ANYTHING, OR PREPARED TO DO ANYTHING TO END YOUR LIFE?: NO
1. IN THE PAST MONTH, HAVE YOU WISHED YOU WERE DEAD OR WISHED YOU COULD GO TO SLEEP AND NOT WAKE UP?: NO

## 2024-07-16 ASSESSMENT — PAIN SCALES - GENERAL: PAINLEVEL: 0-NO PAIN

## 2024-07-16 NOTE — PROGRESS NOTES
Radiation Oncology Nursing Note    IPSS (International Prostate Symptom Score):   12 / 35, bother 1   - He is not experiencing urinary incontinence.  Flomax    - He is experiencing dysuria, but not hematuria, flank pain. Discomfort with urination at night.     Bowel function:    - Denies hematochezia or pain.     Current Systemic Treatment:  Yes, describe: Eligard     Pain: The patient's current pain level was assessed.  They report currently having a pain of 0 out of 10.  They feel their pain is under control without the use of pain medications.    Review of Systems:  Review of Systems   Constitutional:  Positive for fatigue (minor fatigue at times, keeping active and resting PRN).   HENT:  Negative.     Eyes: Negative.    Respiratory: Negative.     Cardiovascular: Negative.    Gastrointestinal: Negative.    Endocrine: Negative.    Genitourinary:  Positive for difficulty urinating (incomplete emptying at night, weaker stream at night, and intermitency of flow at night), dysuria (in the evenings) and nocturia.    Musculoskeletal: Negative.    Skin: Negative.    Neurological:  Positive for numbness (bilateral finger tips - tingling).   Hematological: Negative.    Psychiatric/Behavioral: Negative.

## 2024-07-16 NOTE — PROGRESS NOTES
Staff Physician: Reina Morrell MD PhD  Referring Physician: Modesto Alford APRN-*  Date of Service: 7/16/2024  Patient name: Paul Bruno   MRN: [id number]    RADIATION ONCOLOGY FOLLOW-UP NOTE    IDENTIFYING DATA:  DIAGNOSIS: Recurrent disease, distant / metastatic  cT3b cN1 cM1a, stage IVB     DISEASE STATE: Undergoing active treatment  DISEASE STATUS: Clinical progression, documented by 6/2024 rising PSA and 6/17/2024 PSMA PET/CT  Problem List Items Addressed This Visit       Prostate cancer (Multi) - Primary     Mr. Paul Bruno is 69 -year-old male with metastatic prostate cancer,cT3b cN1 cM1a, stage IVB   (Be[place]av[place] score 4+5=9, GG5, iPSA 40.78. in 2022). He initially presented to elevated PSA at [facility]. He underwent a biopsy which demonstrated Beulav[place] score 4+5=9, MRI of the prostate showed + EPE, + SV involvement, and + pelvic lymph node adenopathy.  His bone scan was negative for osseous metastases.   PSMA PET/CT demonstrated disease in the prostate, pelvic and para-aortic LAD.  The patient has been under the care of medical oncology who initiated bicalutamide on 4/18/2022 with initiation of ADT on 4/26/2022 with a 3-month injection.  The patient has discussed previously escalation with abiraterone/prednisone. S/p VMAT to prostate w/ boost to prostate and pelvis 6250cGy/25 fx+750cGy/3fx completed on 10/27/2022. He stopped taking ADT in 3/2023 and now presented with rising PSA (10.5 on 6/10/2024) and progression with mets to lung. He restarted Lupron on 7/10/2024 with med onc Dr. Mehta.    Oncologic History    03/2022: Prostate Cancer - locally advanced / clinical Stage DARY - lost to f/up concerning for met prostate cancer, EPE/christa mets, PSA 40.78   4/18/2022: started bicalutamide 30 days  4/25/2022: Biopsy reveals GG5 prostate cancer, GS 9  4/26/2022: Started ADT.  Seen by Rad onc , refused yovana/pred  10/2022: Completed VMAT to prostate w/ boost to prostate and pelvis 6250cGy/25 fx+750cGy/3fx     3/2023: non compliant lost to follow up with med onc, stopped ADT  6/10/2024: rising PSA10.5 on 6/10/24/   06/17/2024: PSMA PET CT shows focal uptake in prostate gland c/w recurrent disease, lung nodules SUV 8/ 4.7, 3.7, increased uptake within the penis concerning for metastatic implant vs urinary retention, and left 9th rib at the costovertebral junction.     HISTORY OF PRESENT ILLNESS:    Today, Mr. Paul Bruno is accompanied by wife.  Symptomatically, he reports:    1.  Full independence in activities of daily living.  2.  Urinary function -- IPSS 12, urinary retention requires flomax 0.4mg daily. Has history of bladder stones, which he continues to pass.   3.  Bowel function is normal.   4.  Normal appetite. No unintentional weight gain or loss.   5.  Pain score: 0/10 -- denies any bone pain    PAST MEDICAL HISTORY:  Past Medical History:   Diagnosis Date    Hypertension     Personal history of irradiation     RT in 10/27/22, 28 fractions to prostate    Prostate cancer (Multi)      PAST SURGICAL HISTORY:  Past Surgical History:   Procedure Laterality Date    OTHER SURGICAL HISTORY  07/27/2022    Prostate needle biopsy     ALLERGIES:  No Known Allergies  MEDICATIONS:    Current Outpatient Medications:     ascorbic acid, vitamin C, 1,000 mg ER tablet, Take 1 tablet (1,000 mg) by mouth once daily., Disp: , Rfl:     bicalutamide (Casodex) 50 mg tablet, Take 1 tablet (50 mg total) by mouth once daily.  Take at the same time every day., Disp: 30 tablet, Rfl: 0    tamsulosin (Flomax) 0.4 mg 24 hr capsule, Take 1 capsule daily by mouth., Disp: 30 capsule, Rfl: 3    TURMERIC ORAL, Take 1,000 mg by mouth once daily., Disp: , Rfl:     vitamin E acetate (VITAMIN E ORAL), Take 1 capsule by mouth once daily. vitamin E 180 mg oral capsule, Disp: , Rfl:     famotidine (Pepcid) 20 mg tablet, Take 1 tablet (20 mg) by mouth once daily., Disp: , Rfl:    SOCIAL HISTORY:  Social History     Tobacco Use    Smoking status: Every  "Day     Types: Cigars    Smokeless tobacco: Never   Substance Use Topics    Alcohol use: Not Currently     FAMILY HISTORY:  Family History   Problem Relation Name Age of Onset    Hypertension Other grandparent        REVIEW OF SYSTEMS:  Please refer to RN note.    PHYSICAL EXAMINATION:  /87   Pulse 74   Temp 36.6 °C (97.9 °F) (Temporal)   Resp 18   Ht 1.854 m (6' 1\")   Wt 65.5 kg (144 lb 4.8 oz)   SpO2 95%   BMI 19.04 kg/m²   Constitutional: well developed, no distress, alert & oriented, cooperative  Eyes: pupils equal round and reactive to light, extraocular movements intact  Respiratory: normal work of breathing  MSK: range of motion intact, no joint swelling, normal strength, back non-tender to percussion  Psychological: normal affect      CTCAE (v5) ADVERSE EVENTS:  Toxicity Assessment          2024    10:00   Toxicity Assessment   Treatment Site Pelvis - male   Proctitis Grade 0   Urinary Incontinence Grade 0   Urinary Frequency Grade 1   Urinary Retention Grade 2       on flomax 0.4mg   Urinary Urgency Grade 1       PERFORMANCE STATUS:  KPS/ECO, Able to carry on normal activity; minor signs or symptoms of disease (ECOG equivalent 0)    LABORATORY AND IMAGING DATA:  Imaging: All imaging was personally reviewed and interpreted in clinic. Findings as per HPI and EMR.    Laboratory/Pathology:  All pertinent labs and pathology were personally reviewed and interpreted in clinic. Findings as per HPI and EMR.  Lab Results   Component Value Date    PSA 10.5 (H) 06/10/2024    PSA 0.23 2023    PSA 0.35 2023    PSA 0.46 2022    PSA 1.22 10/10/2022    PSA 0.81 2022    PSA 0.84 2022    PSA 2.89 2022    PSA 11.84 (H) 2022    PSA 52.69 (H) 2022     Lab Results   Component Value Date    TESTOSTERONE 265 06/10/2024    TESTOSTERONE <60 (L) 2023    TESTOSTERONE <60 (L) 2023           IMPRESSION:  Mr. Paul Bruno is 69 -year-old male with " recurrent metastatic prostate cancer,cT3b cN1 cM1a, stage IVB, who initially diagnosed with locally advanced prostate ca in 2022 (Tommy score 4+5=9, GG5, iPSA 40.78), on ADT/SUKHJINDER for 1 year from 04/2022-03/2023, s/P VMAT to prostate w/ boost to prostate and pelvis 6250cGy/25 fx+750cGy/3fx in 10/2022, now presents with rising PSA (10.5) with re-staging PSMA PET/CT demonstrating uptake in multiple lung nodules, 9th rib at costovertebral junction, and potentially intra-prostatic recurrence. He is not in any pain. Has some urinary retention that requires flomax 0.4mg daily.     We discussed that while re-irradiation of the prostate is an option in managing locally recurrence disease, we do not recommend this given his presence of metastatic disease. He is also currently asymptomatic from a  standpoint.  We also discussed the role of metastasis directed radiotherapy to improve local control and palliate pain. He currently does not have any pain. We will hold RT at this time.     We discussed that in metastatic prostate cancer, ADT remains the standard of care. We discussed about the benefits and side effects of ADTs. Pt will continue to follow up with Dr. Mehta for ADT treatment.      He and his wife are in agreement with this plan.      PLAN:   -continue follow up with Dr. Mehta and ADT treatment   -pt is asymptomatic, no RT at this time. Follow up with rad onc as needed.   -can increase flomax to 0.8mg daily to control the urinary symptoms     Frances Allen, PGY-2  Radiation Oncology    --  I personally saw and evaluated the patient. I personally obtained key and critical portions of the history and physical exam and personally reviewed and interpreted imaging and laboratory data. I reviewed and edited the resident's documentation, and discussed the patient with the resident. I agree with the Dr. Allen's plan as documented above.     Reina Morrell MD PhD  , Radiation Oncology

## 2024-07-30 ENCOUNTER — LAB (OUTPATIENT)
Dept: LAB | Facility: HOSPITAL | Age: 69
End: 2024-07-30
Payer: COMMERCIAL

## 2024-07-30 DIAGNOSIS — C61 PROSTATE CANCER (MULTI): ICD-10-CM

## 2024-07-30 LAB
ALBUMIN SERPL BCP-MCNC: 4.2 G/DL (ref 3.4–5)
ALP SERPL-CCNC: 59 U/L (ref 33–136)
ALT SERPL W P-5'-P-CCNC: 12 U/L (ref 10–52)
ANION GAP SERPL CALC-SCNC: 10 MMOL/L (ref 10–20)
AST SERPL W P-5'-P-CCNC: 15 U/L (ref 9–39)
BASOPHILS # BLD AUTO: 0.07 X10*3/UL (ref 0–0.1)
BASOPHILS NFR BLD AUTO: 0.9 %
BILIRUB SERPL-MCNC: 0.5 MG/DL (ref 0–1.2)
BUN SERPL-MCNC: 17 MG/DL (ref 6–23)
CALCIUM SERPL-MCNC: 10.1 MG/DL (ref 8.6–10.3)
CHLORIDE SERPL-SCNC: 108 MMOL/L (ref 98–107)
CO2 SERPL-SCNC: 30 MMOL/L (ref 21–32)
CREAT SERPL-MCNC: 0.88 MG/DL (ref 0.5–1.3)
EGFRCR SERPLBLD CKD-EPI 2021: >90 ML/MIN/1.73M*2
EOSINOPHIL # BLD AUTO: 0.24 X10*3/UL (ref 0–0.4)
EOSINOPHIL NFR BLD AUTO: 3 %
ERYTHROCYTE [DISTWIDTH] IN BLOOD BY AUTOMATED COUNT: 15.7 % (ref 11.5–14.5)
GLUCOSE SERPL-MCNC: 83 MG/DL (ref 74–99)
HCT VFR BLD AUTO: 36.9 % (ref 41–52)
HGB BLD-MCNC: 12.1 G/DL (ref 13.5–17.5)
IMM GRANULOCYTES # BLD AUTO: 0.02 X10*3/UL (ref 0–0.5)
IMM GRANULOCYTES NFR BLD AUTO: 0.2 % (ref 0–0.9)
LYMPHOCYTES # BLD AUTO: 2.68 X10*3/UL (ref 0.8–3)
LYMPHOCYTES NFR BLD AUTO: 33 %
MCH RBC QN AUTO: 25.5 PG (ref 26–34)
MCHC RBC AUTO-ENTMCNC: 32.8 G/DL (ref 32–36)
MCV RBC AUTO: 78 FL (ref 80–100)
MONOCYTES # BLD AUTO: 0.54 X10*3/UL (ref 0.05–0.8)
MONOCYTES NFR BLD AUTO: 6.7 %
NEUTROPHILS # BLD AUTO: 4.57 X10*3/UL (ref 1.6–5.5)
NEUTROPHILS NFR BLD AUTO: 56.2 %
NRBC BLD-RTO: 0 /100 WBCS (ref 0–0)
PLATELET # BLD AUTO: 365 X10*3/UL (ref 150–450)
POTASSIUM SERPL-SCNC: 4.1 MMOL/L (ref 3.5–5.3)
PROT SERPL-MCNC: 7.1 G/DL (ref 6.4–8.2)
PSA SERPL-MCNC: 1.41 NG/ML
RBC # BLD AUTO: 4.75 X10*6/UL (ref 4.5–5.9)
SODIUM SERPL-SCNC: 144 MMOL/L (ref 136–145)
TESTOST SERPL-MCNC: <30 NG/DL (ref 240–1000)
WBC # BLD AUTO: 8.1 X10*3/UL (ref 4.4–11.3)

## 2024-07-30 PROCEDURE — 84403 ASSAY OF TOTAL TESTOSTERONE: CPT

## 2024-07-30 PROCEDURE — 36415 COLL VENOUS BLD VENIPUNCTURE: CPT

## 2024-07-30 PROCEDURE — 84153 ASSAY OF PSA TOTAL: CPT

## 2024-07-30 PROCEDURE — 80053 COMPREHEN METABOLIC PANEL: CPT

## 2024-07-30 PROCEDURE — 85025 COMPLETE CBC W/AUTO DIFF WBC: CPT

## 2024-07-31 ENCOUNTER — TELEPHONE (OUTPATIENT)
Dept: HEMATOLOGY/ONCOLOGY | Facility: CLINIC | Age: 69
End: 2024-07-31
Payer: COMMERCIAL

## 2024-08-02 NOTE — PROGRESS NOTES
Patient ID: Paul Bruno is a 69 y.o. male.  Attending Physician: Dr. Skip Mehta  Cancer Diagnosis: Cancer Staging   No matching staging information was found for the patient.     Current Therapy: ADT (Eligard [id number])  Genetics: Declined    Subjective      Cancer History:  Oncology History    No history exists.     3/2022: PSA 40, MRI prostate and bone scan with locally advanced disease, EPE and + LN's  4/18/2022: started bicalutamide 30 days  4/25/2022: Biopsy reveals GG5 prostate cancer, GS 9  4/26/2022: Started ADT .  Seen by Rad onc , refused yovana/pred  Late 9/2022: Started XRT to prostate  3/2023-6/2024: Lost to follow up.   6/28/2024: Seen again in clinic with recurrent disease in prostate and new lung metastases, potential met to penis  7/10/2024: Restarted ADT. Declined NHT    Interval History:  Mr. Bruno feels well. Said he never really had declined of his hot flashes, but now can tell his energy and ability to build muscle have decreased. Working to improve both of these things by lifting, walking, and working daily. He otherwise has improved urinary symptoms on 2 caps Flomax, but notices about an hour before it is due that symptoms reappear. He otherwise feels well without new or concerning symptoms. The remainder of his ROS is otherwise negative.    HPI    Objective    BSA: There is no height or weight on file to calculate BSA.  There were no vitals taken for this visit.    Physical Exam  PHYSICAL EXAM:   General: alert, well-dressed in NAD. Speech is fluent and coherent, words clear. Good insight. Oriented x4  Skin: warm, dry, and pink without cyanosis or nail clubbing. No rash, petechiae, or ecchymoses  HEENT: Normocephalic atraumatic. Sclera white, conjunctiva pink. EOMs intact. Hearing intact to spoken voice. No visible lesions  Respiratory: Chest expansion symmetric. No audible wheeze. Unlabored breathing.  CV: Good color.  Psych: engaged, polite, appropriate conversation and eye  contact.    Current Medications:    Current Outpatient Medications:     ascorbic acid, vitamin C, 1,000 mg ER tablet, Take 1 tablet (1,000 mg) by mouth once daily., Disp: , Rfl:     famotidine (Pepcid) 20 mg tablet, Take 1 tablet (20 mg) by mouth once daily., Disp: , Rfl:     tamsulosin (Flomax) 0.4 mg 24 hr capsule, Take 1 capsule daily by mouth., Disp: 30 capsule, Rfl: 3    TURMERIC ORAL, Take 1,000 mg by mouth once daily., Disp: , Rfl:     vitamin E acetate (VITAMIN E ORAL), Take 1 capsule by mouth once daily. vitamin E 180 mg oral capsule, Disp: , Rfl:      Most Recent Labs:  Results for orders placed or performed in visit on 07/30/24   Prostate Specific Antigen   Result Value Ref Range    Prostate Specific AG 1.41 <=4.00 ng/mL   Comprehensive Metabolic Panel   Result Value Ref Range    Glucose 83 74 - 99 mg/dL    Sodium 144 136 - 145 mmol/L    Potassium 4.1 3.5 - 5.3 mmol/L    Chloride 108 (H) 98 - 107 mmol/L    Bicarbonate 30 21 - 32 mmol/L    Anion Gap 10 10 - 20 mmol/L    Urea Nitrogen 17 6 - 23 mg/dL    Creatinine 0.88 0.50 - 1.30 mg/dL    eGFR >90 >60 mL/min/1.73m*2    Calcium 10.1 8.6 - 10.3 mg/dL    Albumin 4.2 3.4 - 5.0 g/dL    Alkaline Phosphatase 59 33 - 136 U/L    Total Protein 7.1 6.4 - 8.2 g/dL    AST 15 9 - 39 U/L    Bilirubin, Total 0.5 0.0 - 1.2 mg/dL    ALT 12 10 - 52 U/L   Testosterone   Result Value Ref Range    Testosterone <30 (L) 240 - 1,000 ng/dL   CBC and Auto Differential   Result Value Ref Range    WBC 8.1 4.4 - 11.3 x10*3/uL    nRBC 0.0 0.0 - 0.0 /100 WBCs    RBC 4.75 4.50 - 5.90 x10*6/uL    Hemoglobin 12.1 (L) 13.5 - 17.5 g/dL    Hematocrit 36.9 (L) 41.0 - 52.0 %    MCV 78 (L) 80 - 100 fL    MCH 25.5 (L) 26.0 - 34.0 pg    MCHC 32.8 32.0 - 36.0 g/dL    RDW 15.7 (H) 11.5 - 14.5 %    Platelets 365 150 - 450 x10*3/uL    Neutrophils % 56.2 40.0 - 80.0 %    Immature Granulocytes %, Automated 0.2 0.0 - 0.9 %    Lymphocytes % 33.0 13.0 - 44.0 %    Monocytes % 6.7 2.0 - 10.0 %    Eosinophils  % 3.0 0.0 - 6.0 %    Basophils % 0.9 0.0 - 2.0 %    Neutrophils Absolute 4.57 1.60 - 5.50 x10*3/uL    Immature Granulocytes Absolute, Automated 0.02 0.00 - 0.50 x10*3/uL    Lymphocytes Absolute 2.68 0.80 - 3.00 x10*3/uL    Monocytes Absolute 0.54 0.05 - 0.80 x10*3/uL    Eosinophils Absolute 0.24 0.00 - 0.40 x10*3/uL    Basophils Absolute 0.07 0.00 - 0.10 x10*3/uL      Lab Results   Component Value Date    PSA 1.41 07/30/2024    PSA 10.5 (H) 06/10/2024    PSA 0.23 03/28/2023        Performance Status:  ECOG Score: 0- Fully active, able to carry on all pre-disease performance w/o restriction.  Karnofsky Score: 90 - Able to carry on normal activity; minor signs or symptoms of disease       Assessment/Plan   Paul Bruno is a 69 y.o. male with mCSPC who presents in follow up on ADT. He recently was restarted after being LTFU. He looks and feels well. PSA with nice decline. Urinary symptoms improved with 2 caps tamsulosin so we will continue.     # mCSPC  - Continue ADT, next due October  - Phone visit with Dr. Mehta prior  - Continues to decline additional therapies, but may consider at next visit  - Follow with rad onc PRN    # LUTS  - Tamsulosin 0.8 daily works well, refilled this  - Declined urology referral at this time    RTC 6 weeks when due for next injection for phone visit, he will have labs done about 5 days prior per his report    Total time spent on this encounter was 40 minutes, which included preparation, direct time with patient, documentation, and care coordination on the day of visit.    Hawa Mercedes, MSN, APRN, AGNP-C, AOCNP  Associate Nurse Practitioner  [facility]

## 2024-08-05 ENCOUNTER — OFFICE VISIT (OUTPATIENT)
Dept: HEMATOLOGY/ONCOLOGY | Facility: HOSPITAL | Age: 69
End: 2024-08-05
Payer: COMMERCIAL

## 2024-08-05 VITALS
SYSTOLIC BLOOD PRESSURE: 147 MMHG | BODY MASS INDEX: 18.88 KG/M2 | DIASTOLIC BLOOD PRESSURE: 82 MMHG | OXYGEN SATURATION: 97 % | RESPIRATION RATE: 20 BRPM | WEIGHT: 143.08 LBS | HEART RATE: 72 BPM | TEMPERATURE: 97.7 F

## 2024-08-05 DIAGNOSIS — C61 PROSTATE CANCER (MULTI): Primary | ICD-10-CM

## 2024-08-05 DIAGNOSIS — R39.12 WEAK URINARY STREAM: ICD-10-CM

## 2024-08-05 PROCEDURE — 99215 OFFICE O/P EST HI 40 MIN: CPT | Performed by: NURSE PRACTITIONER

## 2024-08-05 PROCEDURE — 1126F AMNT PAIN NOTED NONE PRSNT: CPT | Performed by: NURSE PRACTITIONER

## 2024-08-05 PROCEDURE — 1159F MED LIST DOCD IN RCRD: CPT | Performed by: NURSE PRACTITIONER

## 2024-08-05 PROCEDURE — 3077F SYST BP >= 140 MM HG: CPT | Performed by: NURSE PRACTITIONER

## 2024-08-05 PROCEDURE — 3079F DIAST BP 80-89 MM HG: CPT | Performed by: NURSE PRACTITIONER

## 2024-08-05 RX ORDER — TAMSULOSIN HYDROCHLORIDE 0.4 MG/1
0.8 CAPSULE ORAL DAILY
Qty: 60 CAPSULE | Refills: 3 | Status: SHIPPED | OUTPATIENT
Start: 2024-08-05

## 2024-08-05 ASSESSMENT — PAIN SCALES - GENERAL: PAINLEVEL: 0-NO PAIN

## 2024-09-03 DIAGNOSIS — R39.12 WEAK URINARY STREAM: ICD-10-CM

## 2024-09-03 RX ORDER — TAMSULOSIN HYDROCHLORIDE 0.4 MG/1
0.8 CAPSULE ORAL DAILY
Qty: 60 CAPSULE | Refills: 0 | Status: SHIPPED | OUTPATIENT
Start: 2024-09-03

## 2024-09-03 NOTE — TELEPHONE ENCOUNTER
Pt's family member left a  requesting refills on Flomax. I called the pt back because Hawa sent this last month with 3 refills. He said he spoke to [facility] this morning & they are telling him they never received the script. Pt said he never got this in August either. He is asking if we can resend. I confirmed pharmacy with him. Script pended to the team.

## 2024-09-25 ENCOUNTER — LAB (OUTPATIENT)
Dept: LAB | Facility: HOSPITAL | Age: 69
End: 2024-09-25
Payer: COMMERCIAL

## 2024-09-25 DIAGNOSIS — C61 MALIGNANT NEOPLASM OF PROSTATE (MULTI): ICD-10-CM

## 2024-09-25 DIAGNOSIS — C61 PROSTATE CANCER (MULTI): ICD-10-CM

## 2024-09-25 LAB
ALBUMIN SERPL BCP-MCNC: 4.1 G/DL (ref 3.4–5)
ALP SERPL-CCNC: 55 U/L (ref 33–136)
ALT SERPL W P-5'-P-CCNC: 15 U/L (ref 10–52)
ANION GAP SERPL CALC-SCNC: 11 MMOL/L (ref 10–20)
AST SERPL W P-5'-P-CCNC: 19 U/L (ref 9–39)
BASOPHILS # BLD AUTO: 0.09 X10*3/UL (ref 0–0.1)
BASOPHILS NFR BLD AUTO: 1.1 %
BILIRUB SERPL-MCNC: 0.5 MG/DL (ref 0–1.2)
BUN SERPL-MCNC: 15 MG/DL (ref 6–23)
CALCIUM SERPL-MCNC: 10.2 MG/DL (ref 8.6–10.3)
CHLORIDE SERPL-SCNC: 105 MMOL/L (ref 98–107)
CO2 SERPL-SCNC: 31 MMOL/L (ref 21–32)
CREAT SERPL-MCNC: 0.87 MG/DL (ref 0.5–1.3)
EGFRCR SERPLBLD CKD-EPI 2021: >90 ML/MIN/1.73M*2
EOSINOPHIL # BLD AUTO: 0.25 X10*3/UL (ref 0–0.4)
EOSINOPHIL NFR BLD AUTO: 2.9 %
ERYTHROCYTE [DISTWIDTH] IN BLOOD BY AUTOMATED COUNT: 14.5 % (ref 11.5–14.5)
GLUCOSE SERPL-MCNC: 95 MG/DL (ref 74–99)
HCT VFR BLD AUTO: 36.9 % (ref 41–52)
HGB BLD-MCNC: 12.2 G/DL (ref 13.5–17.5)
IMM GRANULOCYTES # BLD AUTO: 0.04 X10*3/UL (ref 0–0.5)
IMM GRANULOCYTES NFR BLD AUTO: 0.5 % (ref 0–0.9)
LYMPHOCYTES # BLD AUTO: 2.92 X10*3/UL (ref 0.8–3)
LYMPHOCYTES NFR BLD AUTO: 34.4 %
MCH RBC QN AUTO: 25.7 PG (ref 26–34)
MCHC RBC AUTO-ENTMCNC: 33.1 G/DL (ref 32–36)
MCV RBC AUTO: 78 FL (ref 80–100)
MONOCYTES # BLD AUTO: 0.54 X10*3/UL (ref 0.05–0.8)
MONOCYTES NFR BLD AUTO: 6.4 %
NEUTROPHILS # BLD AUTO: 4.66 X10*3/UL (ref 1.6–5.5)
NEUTROPHILS NFR BLD AUTO: 54.7 %
NRBC BLD-RTO: 0 /100 WBCS (ref 0–0)
PLATELET # BLD AUTO: 351 X10*3/UL (ref 150–450)
POTASSIUM SERPL-SCNC: 4.4 MMOL/L (ref 3.5–5.3)
PROT SERPL-MCNC: 7 G/DL (ref 6.4–8.2)
PSA SERPL-MCNC: 1.29 NG/ML
RBC # BLD AUTO: 4.74 X10*6/UL (ref 4.5–5.9)
SODIUM SERPL-SCNC: 143 MMOL/L (ref 136–145)
TESTOST SERPL-MCNC: <30 NG/DL (ref 240–1000)
WBC # BLD AUTO: 8.5 X10*3/UL (ref 4.4–11.3)

## 2024-09-25 PROCEDURE — 84154 ASSAY OF PSA FREE: CPT

## 2024-09-25 PROCEDURE — 80053 COMPREHEN METABOLIC PANEL: CPT

## 2024-09-25 PROCEDURE — 84153 ASSAY OF PSA TOTAL: CPT

## 2024-09-25 PROCEDURE — 36415 COLL VENOUS BLD VENIPUNCTURE: CPT

## 2024-09-25 PROCEDURE — 84403 ASSAY OF TOTAL TESTOSTERONE: CPT

## 2024-09-25 PROCEDURE — 85025 COMPLETE CBC W/AUTO DIFF WBC: CPT

## 2024-09-25 NOTE — PROGRESS NOTES
Oncology Follow up Note  Phone visit    Cancer History  Prostate Cancer - locally advanced / clinical Stage DARY - lost to f/up concerning for met prostate cancer  EPE/christa mets  Treatment  -UTI , retention, cysto neg, PSA 40.78 3/22 - MRI prostate, bone scan   - -chronic daniel in place  -PET Gallium on study -large mass in the prostate gland with extension into both seminal vesicles avid pelvic lymph node metastases perirectal, right external iliac bilateral common iliac increased expression and aortocaval lymph node  4/18/2022: started bicalutamide 30 days  4/25/2022: Biopsy reveals GG5 prostate cancer, GS 9  4/26/2022: Started ADT .  Seen by Rad onc , refused yovana/pred  -Started XRT end 9/2022 prostate and pelvis  -non compliant lost to follow up, and our clinic, last ADT 3/23  -rising PSA/ progression with mets to lung   -7/10/24 ADT restarted , refused [facility]HT, [facility]    Provider Team  No ref. provider found   MD Iron Ware  PCP Soren Adame - Cardiology   Mark Barrett - rad onc    Other contributing history  microcytosis, Aflutter, urinary incontinence, uncontrolled HTN, ongoing issues with catheter / urinary tract issues     Interval history:  The patient presents for follow up.      ROS:  10-pt ROS reviewed and negative except as mentioned above.    Medications: below was reviewed and is accurate  Current Outpatient Medications   Medication Instructions    ascorbic acid, vitamin C, 1,000 mg ER tablet 1 tablet, oral, Daily    famotidine (Pepcid) 20 mg tablet 1 tablet, oral, Daily    tamsulosin (FLOMAX) 0.8 mg, oral, Daily    TURMERIC ORAL 1,000 mg, oral, Daily    vitamin E acetate (VITAMIN E ORAL) 1 capsule, oral, Daily, vitamin E 180 mg oral capsule        Detailed family history and social history reviewed in detail and updated per epic charting and in detail with the patient    Physical exam:  There were no vitals filed for this  visit.    Phone visit / virtual visit    Radiology review  Reviewed in detail personally and for detail see results in EPIC  6/17/24 - focal increase in prostate gland, c/w recurrent disease, lung nodules SUV 8/ 4.7, 3.7, increased update in the penis, increased uptake within the penis , mets implant vs urinary retention      Genomics Data    Laboratory review  Reviewed in detail personally and for detail see results in EPIC  Lab Results   Component Value Date    WBC 8.5 09/25/2024    HGB 12.2 (L) 09/25/2024    HCT 36.9 (L) 09/25/2024    MCV 78 (L) 09/25/2024     09/25/2024     Lab Results   Component Value Date    GLUCOSE 95 09/25/2024    CALCIUM 10.2 09/25/2024     09/25/2024    K 4.4 09/25/2024    CO2 31 09/25/2024     09/25/2024    BUN 15 09/25/2024    CREATININE 0.87 09/25/2024     Lab Results   Component Value Date    PSA 1.41 07/30/2024    PSA 10.5 (H) 06/10/2024    PSA 0.23 03/28/2023     Lab Results   Component Value Date    ALT 15 09/25/2024    AST 19 09/25/2024    ALKPHOS 55 09/25/2024    BILITOT 0.5 09/25/2024     Lab Results   Component Value Date    TESTOSTERONE <30 (L) 07/30/2024       ASSESSMENT AND PLAN     Prostate Cancer -patient is due for ADT next week PSA is responding.  Options discussed in detail  LUTS -refusing urology follow-up  Microcytosis -could refer to hematology wanted to hold off for now  Did explain to him in detail the need to follow-up with his primary care physician and he will contact them for follow-up.  discussed need while on ADT  maintain adequate cardiovascular health, exercise, dietary modifications,  bone health with calcium 1000 mg with vitamin D 400-800 international units      Skip Mehta MD  Senior Attending Physician/  in Medicine [facility] [employer]  [facility] / [facility]  Patient line [phone]  Fax [phone]

## 2024-09-26 LAB
PSA FREE MFR SERPL: 41 %
PSA FREE SERPL-MCNC: 0.7 NG/ML
PSA SERPL IA-MCNC: 1.7 NG/ML (ref 0–4)

## 2024-09-27 ENCOUNTER — OFFICE VISIT (OUTPATIENT)
Dept: HEMATOLOGY/ONCOLOGY | Facility: HOSPITAL | Age: 69
End: 2024-09-27
Payer: COMMERCIAL

## 2024-09-27 ENCOUNTER — APPOINTMENT (OUTPATIENT)
Dept: HEMATOLOGY/ONCOLOGY | Facility: HOSPITAL | Age: 69
End: 2024-09-27
Payer: COMMERCIAL

## 2024-09-27 VITALS
OXYGEN SATURATION: 94 % | RESPIRATION RATE: 18 BRPM | SYSTOLIC BLOOD PRESSURE: 130 MMHG | WEIGHT: 138.45 LBS | BODY MASS INDEX: 18.27 KG/M2 | TEMPERATURE: 97.5 F | HEART RATE: 78 BPM | DIASTOLIC BLOOD PRESSURE: 78 MMHG

## 2024-09-27 DIAGNOSIS — C61 PROSTATE CANCER (MULTI): Primary | ICD-10-CM

## 2024-09-27 PROCEDURE — 3075F SYST BP GE 130 - 139MM HG: CPT | Performed by: INTERNAL MEDICINE

## 2024-09-27 PROCEDURE — 99214 OFFICE O/P EST MOD 30 MIN: CPT | Performed by: INTERNAL MEDICINE

## 2024-09-27 PROCEDURE — 1159F MED LIST DOCD IN RCRD: CPT | Performed by: INTERNAL MEDICINE

## 2024-09-27 PROCEDURE — 3078F DIAST BP <80 MM HG: CPT | Performed by: INTERNAL MEDICINE

## 2024-09-27 PROCEDURE — 1126F AMNT PAIN NOTED NONE PRSNT: CPT | Performed by: INTERNAL MEDICINE

## 2024-09-27 PROCEDURE — 4004F PT TOBACCO SCREEN RCVD TLK: CPT | Performed by: INTERNAL MEDICINE

## 2024-09-27 ASSESSMENT — PAIN SCALES - GENERAL: PAINLEVEL: 0-NO PAIN

## 2024-09-27 NOTE — PROGRESS NOTES
Oncology Follow up Note  Phone visit    Cancer History  Prostate Cancer - locally advanced / clinical Stage DARY - lost to f/up concerning for met prostate cancer  EPE/christa mets  Treatment  -UTI , retention, cysto neg, PSA 40.78 3/22 - MRI prostate, bone scan   - -chronic daniel in place  -PET Gallium on study -large mass in the prostate gland with extension into both seminal vesicles avid pelvic lymph node metastases perirectal, right external iliac bilateral common iliac increased expression and aortocaval lymph node  4/18/2022: started bicalutamide 30 days  4/25/2022: Biopsy reveals GG5 prostate cancer, GS 9  4/26/2022: Started ADT .  Seen by Rad onc , refused yovana/pred  -Started XRT end 9/2022 prostate and pelvis  -non compliant lost to follow up, and our clinic, last ADT 3/23  -rising PSA/ progression with mets to lung   -7/10/24 ADT restarted , refused NHT, last ADT Eligard 7/10/24    Provider Team  No ref. provider found   MD Iron Ware  PCP Soren Adame - Cardiology   Mark Barrett - rad onc    Other contributing history  microcytosis, Aflutter, urinary incontinence, uncontrolled HTN, ongoing issues with catheter / urinary tract issues , no longer following up with urology and no daniel placement    Interval history:  The patient presents for follow up.  The patient notes he is tolerating the ADT without complications he is accompanied by his family.  Some ongoing issues with hot flashes.  Denies any other major new symptoms.  Denies any ongoing issues with worsening urinary symptoms continues on the Flomax.  Denies any nausea, vomiting, fevers, chills, easy bruising or bleeding denies any chest pain or chest tightness.    ROS:  10-pt ROS reviewed and negative except as mentioned above.    Medications: below was reviewed and is accurate  Current Outpatient Medications   Medication Instructions    ascorbic acid, vitamin C, 1,000 mg ER tablet 1 tablet,  oral, Daily    famotidine (Pepcid) 20 mg tablet 1 tablet, oral, Daily    tamsulosin (FLOMAX) 0.8 mg, oral, Daily    TURMERIC ORAL 1,000 mg, oral, Daily    vitamin E acetate (VITAMIN E ORAL) 1 capsule, oral, Daily, vitamin E 180 mg oral capsule        Detailed family history and social history reviewed in detail and updated per epic charting and in detail with the patient    Physical exam:  Vitals:    09/27/24 0943   BP: 130/78   BP Location: Left arm   Patient Position: Sitting   BP Cuff Size: Adult   Pulse: 78   Resp: 18   Temp: 36.4 °C (97.5 °F)   TempSrc: Temporal   SpO2: 94%   Weight: 62.8 kg (138 lb 7.2 oz)         GEN: NAD, well-appearing  SKIN: no concerning new lesions examined in upper / lower extremity   LUNGS: CTA  CV: RRR no clear R/G  ABD: Soft, NTND. No rebound or guarding.  EXT:  trace edema bilaterally   NEURO: Grossly intact. No focal deficits.  PSYCH: Normal mood and affect      Radiology review  Reviewed in detail personally and for detail see results in EPIC  PET imagine - 6/17/24 - focal increase in prostate gland, c/w recurrent disease, lung nodules SUV 8/ 4.7, 3.7, increased update in the penis, increased uptake within the penis , mets implant vs urinary retention      Genomics Data    Laboratory review  Reviewed in detail personally and for detail see results in EPIC  Lab Results   Component Value Date    WBC 8.5 09/25/2024    HGB 12.2 (L) 09/25/2024    HCT 36.9 (L) 09/25/2024    MCV 78 (L) 09/25/2024     09/25/2024     Lab Results   Component Value Date    GLUCOSE 95 09/25/2024    CALCIUM 10.2 09/25/2024     09/25/2024    K 4.4 09/25/2024    CO2 31 09/25/2024     09/25/2024    BUN 15 09/25/2024    CREATININE 0.87 09/25/2024     Lab Results   Component Value Date    PSA 1.7 09/25/2024    PSA 1.29 09/25/2024    PSA 1.41 07/30/2024     Lab Results   Component Value Date    ALT 15 09/25/2024    AST 19 09/25/2024    ALKPHOS 55 09/25/2024    BILITOT 0.5 09/25/2024     Lab  Results   Component Value Date    TESTOSTERONE <30 (L) 09/25/2024       ASSESSMENT AND PLAN     Prostate Cancer -patient is due for ADT next week PSA is responding.  Options discussed in detail and he continues to want to hold off on escalation of therapy we will continue with follow-up and monitoring will arrange a follow-up in 3 months when he is due for his next ADT with labs prior and he will contact us for any other new symptoms.  LUTS -refusing urology follow-up  Microcytosis -could refer to hematology wanted to hold off for now  Did explain to him in detail the need to follow-up with his primary care physician and he will contact them for follow-up.  discussed need while on ADT  maintain adequate cardiovascular health, exercise, dietary modifications,  bone health with calcium 1000 mg with vitamin D 400-800 international units      Skip Mehta MD  Senior Attending Physician/  in Medicine [facility] [employer]  [facility] / [facility]  Patient line [phone]  Fax [phone]

## 2024-10-02 ENCOUNTER — INFUSION (OUTPATIENT)
Dept: HEMATOLOGY/ONCOLOGY | Facility: HOSPITAL | Age: 69
End: 2024-10-02
Payer: COMMERCIAL

## 2024-10-02 VITALS
BODY MASS INDEX: 18.85 KG/M2 | RESPIRATION RATE: 18 BRPM | WEIGHT: 142.86 LBS | OXYGEN SATURATION: 96 % | DIASTOLIC BLOOD PRESSURE: 74 MMHG | HEART RATE: 82 BPM | SYSTOLIC BLOOD PRESSURE: 149 MMHG | TEMPERATURE: 97.7 F

## 2024-10-02 DIAGNOSIS — C61 PROSTATE CANCER (MULTI): ICD-10-CM

## 2024-10-02 PROCEDURE — 96402 CHEMO HORMON ANTINEOPL SQ/IM: CPT

## 2024-10-02 PROCEDURE — 2500000004 HC RX 250 GENERAL PHARMACY W/ HCPCS (ALT 636 FOR OP/ED): Mod: JZ | Performed by: INTERNAL MEDICINE

## 2024-10-02 RX ORDER — EPINEPHRINE 0.3 MG/.3ML
0.3 INJECTION SUBCUTANEOUS EVERY 5 MIN PRN
OUTPATIENT
Start: 2024-12-25

## 2024-10-02 RX ORDER — DIPHENHYDRAMINE HYDROCHLORIDE 50 MG/ML
50 INJECTION INTRAMUSCULAR; INTRAVENOUS AS NEEDED
OUTPATIENT
Start: 2024-12-25

## 2024-10-02 RX ORDER — FAMOTIDINE 10 MG/ML
20 INJECTION INTRAVENOUS ONCE AS NEEDED
OUTPATIENT
Start: 2024-12-25

## 2024-10-02 RX ORDER — ALBUTEROL SULFATE 0.83 MG/ML
3 SOLUTION RESPIRATORY (INHALATION) AS NEEDED
OUTPATIENT
Start: 2024-12-25

## 2024-10-02 ASSESSMENT — PAIN SCALES - GENERAL: PAINLEVEL: 0-NO PAIN

## 2024-12-23 ENCOUNTER — APPOINTMENT (OUTPATIENT)
Dept: HEMATOLOGY/ONCOLOGY | Facility: HOSPITAL | Age: 69
End: 2024-12-23
Payer: COMMERCIAL

## 2024-12-30 ENCOUNTER — LAB (OUTPATIENT)
Dept: LAB | Facility: HOSPITAL | Age: 69
End: 2024-12-30
Payer: COMMERCIAL

## 2024-12-30 DIAGNOSIS — C61 PROSTATE CANCER (MULTI): ICD-10-CM

## 2024-12-30 LAB
ALBUMIN SERPL BCP-MCNC: 4.4 G/DL (ref 3.4–5)
ALP SERPL-CCNC: 62 U/L (ref 33–136)
ALT SERPL W P-5'-P-CCNC: 14 U/L (ref 10–52)
ANION GAP SERPL CALC-SCNC: 11 MMOL/L (ref 10–20)
AST SERPL W P-5'-P-CCNC: 14 U/L (ref 9–39)
BASOPHILS # BLD AUTO: 0.07 X10*3/UL (ref 0–0.1)
BASOPHILS NFR BLD AUTO: 0.8 %
BILIRUB SERPL-MCNC: 0.6 MG/DL (ref 0–1.2)
BUN SERPL-MCNC: 13 MG/DL (ref 6–23)
CALCIUM SERPL-MCNC: 9.8 MG/DL (ref 8.6–10.3)
CHLORIDE SERPL-SCNC: 106 MMOL/L (ref 98–107)
CO2 SERPL-SCNC: 30 MMOL/L (ref 21–32)
CREAT SERPL-MCNC: 0.81 MG/DL (ref 0.5–1.3)
EGFRCR SERPLBLD CKD-EPI 2021: >90 ML/MIN/1.73M*2
EOSINOPHIL # BLD AUTO: 0.18 X10*3/UL (ref 0–0.4)
EOSINOPHIL NFR BLD AUTO: 2 %
ERYTHROCYTE [DISTWIDTH] IN BLOOD BY AUTOMATED COUNT: 14.5 % (ref 11.5–14.5)
GLUCOSE SERPL-MCNC: 103 MG/DL (ref 74–99)
HCT VFR BLD AUTO: 39.9 % (ref 41–52)
HGB BLD-MCNC: 13.4 G/DL (ref 13.5–17.5)
IMM GRANULOCYTES # BLD AUTO: 0.02 X10*3/UL (ref 0–0.5)
IMM GRANULOCYTES NFR BLD AUTO: 0.2 % (ref 0–0.9)
LYMPHOCYTES # BLD AUTO: 2.3 X10*3/UL (ref 0.8–3)
LYMPHOCYTES NFR BLD AUTO: 26 %
MCH RBC QN AUTO: 26 PG (ref 26–34)
MCHC RBC AUTO-ENTMCNC: 33.6 G/DL (ref 32–36)
MCV RBC AUTO: 77 FL (ref 80–100)
MONOCYTES # BLD AUTO: 0.57 X10*3/UL (ref 0.05–0.8)
MONOCYTES NFR BLD AUTO: 6.4 %
NEUTROPHILS # BLD AUTO: 5.72 X10*3/UL (ref 1.6–5.5)
NEUTROPHILS NFR BLD AUTO: 64.6 %
NRBC BLD-RTO: 0 /100 WBCS (ref 0–0)
PLATELET # BLD AUTO: 338 X10*3/UL (ref 150–450)
POTASSIUM SERPL-SCNC: 5.3 MMOL/L (ref 3.5–5.3)
PROT SERPL-MCNC: 7.5 G/DL (ref 6.4–8.2)
PSA SERPL-MCNC: 3.03 NG/ML
RBC # BLD AUTO: 5.16 X10*6/UL (ref 4.5–5.9)
SODIUM SERPL-SCNC: 142 MMOL/L (ref 136–145)
TESTOST SERPL-MCNC: <30 NG/DL (ref 240–1000)
WBC # BLD AUTO: 8.9 X10*3/UL (ref 4.4–11.3)

## 2024-12-30 PROCEDURE — 85025 COMPLETE CBC W/AUTO DIFF WBC: CPT

## 2024-12-30 PROCEDURE — 84153 ASSAY OF PSA TOTAL: CPT

## 2024-12-30 PROCEDURE — 36415 COLL VENOUS BLD VENIPUNCTURE: CPT

## 2024-12-30 PROCEDURE — 84403 ASSAY OF TOTAL TESTOSTERONE: CPT

## 2024-12-30 PROCEDURE — 80053 COMPREHEN METABOLIC PANEL: CPT

## 2024-12-31 ENCOUNTER — TELEPHONE (OUTPATIENT)
Dept: ADMISSION | Facility: HOSPITAL | Age: 69
End: 2024-12-31
Payer: COMMERCIAL

## 2024-12-31 DIAGNOSIS — R39.12 WEAK URINARY STREAM: ICD-10-CM

## 2024-12-31 RX ORDER — TAMSULOSIN HYDROCHLORIDE 0.4 MG/1
0.8 CAPSULE ORAL DAILY
Qty: 60 CAPSULE | Refills: 0 | Status: SHIPPED | OUTPATIENT
Start: 2024-12-31

## 2025-01-06 ENCOUNTER — OFFICE VISIT (OUTPATIENT)
Dept: HEMATOLOGY/ONCOLOGY | Facility: HOSPITAL | Age: 70
End: 2025-01-06
Payer: COMMERCIAL

## 2025-01-06 ENCOUNTER — INFUSION (OUTPATIENT)
Dept: HEMATOLOGY/ONCOLOGY | Facility: HOSPITAL | Age: 70
End: 2025-01-06
Payer: COMMERCIAL

## 2025-01-06 VITALS
RESPIRATION RATE: 18 BRPM | BODY MASS INDEX: 17.68 KG/M2 | HEART RATE: 91 BPM | DIASTOLIC BLOOD PRESSURE: 94 MMHG | OXYGEN SATURATION: 95 % | TEMPERATURE: 97.7 F | WEIGHT: 134.04 LBS | SYSTOLIC BLOOD PRESSURE: 164 MMHG

## 2025-01-06 DIAGNOSIS — C61 PROSTATE CANCER (MULTI): ICD-10-CM

## 2025-01-06 PROCEDURE — 1126F AMNT PAIN NOTED NONE PRSNT: CPT | Performed by: NURSE PRACTITIONER

## 2025-01-06 PROCEDURE — 3080F DIAST BP >= 90 MM HG: CPT | Performed by: NURSE PRACTITIONER

## 2025-01-06 PROCEDURE — 3077F SYST BP >= 140 MM HG: CPT | Performed by: NURSE PRACTITIONER

## 2025-01-06 PROCEDURE — 2500000004 HC RX 250 GENERAL PHARMACY W/ HCPCS (ALT 636 FOR OP/ED): Mod: JZ,JG,TB | Performed by: INTERNAL MEDICINE

## 2025-01-06 PROCEDURE — 99215 OFFICE O/P EST HI 40 MIN: CPT | Performed by: NURSE PRACTITIONER

## 2025-01-06 PROCEDURE — 1159F MED LIST DOCD IN RCRD: CPT | Performed by: NURSE PRACTITIONER

## 2025-01-06 PROCEDURE — 96402 CHEMO HORMON ANTINEOPL SQ/IM: CPT

## 2025-01-06 RX ORDER — EPINEPHRINE 0.3 MG/.3ML
0.3 INJECTION SUBCUTANEOUS EVERY 5 MIN PRN
OUTPATIENT
Start: 2025-03-16

## 2025-01-06 RX ORDER — DIPHENHYDRAMINE HYDROCHLORIDE 50 MG/ML
50 INJECTION INTRAMUSCULAR; INTRAVENOUS AS NEEDED
OUTPATIENT
Start: 2025-03-16

## 2025-01-06 RX ORDER — FAMOTIDINE 10 MG/ML
20 INJECTION INTRAVENOUS ONCE AS NEEDED
OUTPATIENT
Start: 2025-03-16

## 2025-01-06 RX ORDER — ALBUTEROL SULFATE 0.83 MG/ML
3 SOLUTION RESPIRATORY (INHALATION) AS NEEDED
OUTPATIENT
Start: 2025-03-16

## 2025-01-06 RX ADMIN — LEUPROLIDE ACETATE 22.5 MG: 22.5 INJECTION, SUSPENSION, EXTENDED RELEASE SUBCUTANEOUS at 10:35

## 2025-01-06 ASSESSMENT — PAIN SCALES - GENERAL: PAINLEVEL_OUTOF10: 0-NO PAIN

## 2025-01-06 NOTE — PROGRESS NOTES
Pt here for Eligard injection. Pt saw provider prior to arrival. See provider note for full assessment. Pt tolerated injection without issue and was discharged in stable condition.

## 2025-01-06 NOTE — PROGRESS NOTES
Patient ID: Paul Bruno is a 69 y.o. male.  Attending Physician: Dr. Skip Mehta  Cancer Diagnosis:  Cancer Staging   No matching staging information was found for the patient.     Current Therapy: ADT (Eligard [id number])  Genetics: Declined    Subjective      Cancer History:  Oncology History    No history exists.     3/2022: PSA 40, MRI prostate and bone scan with locally advanced disease, EPE and + LN's  4/18/2022: started bicalutamide 30 days  4/25/2022: Biopsy reveals GG5 prostate cancer, GS 9  4/26/2022: Started ADT .  Seen by Rad onc , refused yovana/pred  Late 9/2022: Started XRT to prostate  3/2023-6/2024: Lost to follow up.   6/28/2024: Seen again in clinic with recurrent disease in prostate and new lung metastases, potential met to penis  7/10/2024: Restarted ADT. Declined NHT    Interval History:  Mr. Bruno feels well. He says he feels well overall. He still has hot flashes, though they are not terribly bothersome. Energy is good. Working full time. He comes today with his wife, Amy. He has no new urinary symptoms or new or concerning symptoms at this time. The remainder of his ROS is otherwise negative.  HPI    Objective    BSA: 1.77 meters squared  BP (!) 164/94   Pulse 91   Temp 36.5 °C (97.7 °F) (Core)   Resp 18   Wt 60.8 kg (134 lb 0.6 oz)   SpO2 95%   BMI 17.68 kg/m²     Physical Exam  PHYSICAL EXAM:   General: alert, well-dressed in NAD. Speech is fluent and coherent, words clear. Good insight. Oriented x4  Skin: warm, dry, and pink without cyanosis or nail clubbing. No rash, petechiae, or ecchymoses  HEENT: Normocephalic atraumatic. Sclera white, conjunctiva pink. EOMs intact. Hearing intact to spoken voice. No visible lesions  Respiratory: Chest expansion symmetric. No audible wheeze. Unlabored breathing.  CV: Good color.  Psych: engaged, polite, appropriate conversation and eye contact.    Current Medications:    Current Outpatient Medications:     ascorbic acid, vitamin C,  1,000 mg ER tablet, Take 1 tablet (1,000 mg) by mouth once daily., Disp: , Rfl:     famotidine (Pepcid) 20 mg tablet, Take 1 tablet (20 mg) by mouth once daily., Disp: , Rfl:     tamsulosin (Flomax) 0.4 mg 24 hr capsule, Take 2 capsules (0.8 mg) by mouth once daily., Disp: 60 capsule, Rfl: 0    TURMERIC ORAL, Take 1,000 mg by mouth once daily., Disp: , Rfl:     vitamin E acetate (VITAMIN E ORAL), Take 1 capsule by mouth once daily. vitamin E 180 mg oral capsule, Disp: , Rfl:      Most Recent Labs:  Results for orders placed or performed in visit on 12/30/24   Prostate Specific Antigen    Collection Time: 12/30/24  8:16 AM   Result Value Ref Range    Prostate Specific AG 3.03 <=4.00 ng/mL   Comprehensive Metabolic Panel    Collection Time: 12/30/24  8:16 AM   Result Value Ref Range    Glucose 103 (H) 74 - 99 mg/dL    Sodium 142 136 - 145 mmol/L    Potassium 5.3 3.5 - 5.3 mmol/L    Chloride 106 98 - 107 mmol/L    Bicarbonate 30 21 - 32 mmol/L    Anion Gap 11 10 - 20 mmol/L    Urea Nitrogen 13 6 - 23 mg/dL    Creatinine 0.81 0.50 - 1.30 mg/dL    eGFR >90 >60 mL/min/1.73m*2    Calcium 9.8 8.6 - 10.3 mg/dL    Albumin 4.4 3.4 - 5.0 g/dL    Alkaline Phosphatase 62 33 - 136 U/L    Total Protein 7.5 6.4 - 8.2 g/dL    AST 14 9 - 39 U/L    Bilirubin, Total 0.6 0.0 - 1.2 mg/dL    ALT 14 10 - 52 U/L   Testosterone    Collection Time: 12/30/24  8:16 AM   Result Value Ref Range    Testosterone <30 (L) 240 - 1,000 ng/dL   CBC and Auto Differential    Collection Time: 12/30/24  8:16 AM   Result Value Ref Range    WBC 8.9 4.4 - 11.3 x10*3/uL    nRBC 0.0 0.0 - 0.0 /100 WBCs    RBC 5.16 4.50 - 5.90 x10*6/uL    Hemoglobin 13.4 (L) 13.5 - 17.5 g/dL    Hematocrit 39.9 (L) 41.0 - 52.0 %    MCV 77 (L) 80 - 100 fL    MCH 26.0 26.0 - 34.0 pg    MCHC 33.6 32.0 - 36.0 g/dL    RDW 14.5 11.5 - 14.5 %    Platelets 338 150 - 450 x10*3/uL    Neutrophils % 64.6 40.0 - 80.0 %    Immature Granulocytes %, Automated 0.2 0.0 - 0.9 %    Lymphocytes % 26.0  13.0 - 44.0 %    Monocytes % 6.4 2.0 - 10.0 %    Eosinophils % 2.0 0.0 - 6.0 %    Basophils % 0.8 0.0 - 2.0 %    Neutrophils Absolute 5.72 (H) 1.60 - 5.50 x10*3/uL    Immature Granulocytes Absolute, Automated 0.02 0.00 - 0.50 x10*3/uL    Lymphocytes Absolute 2.30 0.80 - 3.00 x10*3/uL    Monocytes Absolute 0.57 0.05 - 0.80 x10*3/uL    Eosinophils Absolute 0.18 0.00 - 0.40 x10*3/uL    Basophils Absolute 0.07 0.00 - 0.10 x10*3/uL      Lab Results   Component Value Date    PSA 3.03 12/30/2024    PSA 1.7 09/25/2024    PSA 1.29 09/25/2024        Performance Status:  ECOG Score: 0- Fully active, able to carry on all pre-disease performance w/o restriction.  Karnofsky Score: 90 - Able to carry on normal activity; minor signs or symptoms of disease       Assessment/Plan   Paul Bruno is a 69 y.o. male with mCSPC who presents in follow up on ADT. He recently was restarted after being LTFU. He looks and feels well. Though his PSA his risen significantly. Dr. Mehta recommends a PSMA PET scan and follow up, which I ordered today and he is agreeable to.    # mCSPC  - Continue ADT, due today and again in April 2025  - PSMA PET due to rising PSA on therapy  - Follow with rad onc PRN    # LUTS  - Tamsulosin 0.8 daily works well  - He is agreeable to see urology at [facility] [place], placed referral today    RTC after PSMA PET    Total time spent on this encounter was 50 minutes, which included preparation, direct time with patient, documentation, and care coordination on the day of visit.    Hawa Mercedes, MSN, APRN, AGNP-C, AOCNP  Associate Nurse Practitioner  [facility] [place], [facility]

## 2025-01-14 DIAGNOSIS — C61 PROSTATE CANCER (MULTI): Primary | ICD-10-CM

## 2025-01-15 ENCOUNTER — TELEPHONE (OUTPATIENT)
Dept: HEMATOLOGY/ONCOLOGY | Facility: HOSPITAL | Age: 70
End: 2025-01-15
Payer: COMMERCIAL

## 2025-01-15 NOTE — TELEPHONE ENCOUNTER
Called patient to discuss, no answer, VM is full. Looks like PET PSMA was ordered and scheduled. NM Bone scan and CT CAP also ordered but not yet scheduled, assuming his questions are regarding this.

## 2025-01-21 ENCOUNTER — APPOINTMENT (OUTPATIENT)
Dept: RADIOLOGY | Facility: HOSPITAL | Age: 70
End: 2025-01-21
Payer: COMMERCIAL

## 2025-01-27 ENCOUNTER — APPOINTMENT (OUTPATIENT)
Dept: RADIOLOGY | Facility: HOSPITAL | Age: 70
End: 2025-01-27
Payer: COMMERCIAL

## 2025-01-27 DIAGNOSIS — R39.12 WEAK URINARY STREAM: ICD-10-CM

## 2025-01-27 RX ORDER — TAMSULOSIN HYDROCHLORIDE 0.4 MG/1
0.8 CAPSULE ORAL DAILY
Qty: 60 CAPSULE | Refills: 2 | Status: SHIPPED | OUTPATIENT
Start: 2025-01-27

## 2025-01-30 ENCOUNTER — TELEPHONE (OUTPATIENT)
Dept: HEMATOLOGY/ONCOLOGY | Facility: CLINIC | Age: 70
End: 2025-01-30
Payer: COMMERCIAL

## 2025-01-30 NOTE — TELEPHONE ENCOUNTER
RN called insurance company, they did not know what RN was talking about. RN returned patient's phone call. Patient is currently at work and has letters from insurance at home regarding his imaging. Patient would like appointment tomorrow to be changed to virtual due to imaging not being completed yet. Patient aware the imaging is needed. RN will change patient's appointment to a phone call per patient request. No further questions or concerns at this time.   Alisson Willoughby RN 01/30/25 1:25 PM

## 2025-01-30 NOTE — PROGRESS NOTES
Oncology Follow up Note  Phone visit    Cancer History  Prostate Cancer - locally advanced / clinical Stage DARY - lost to f/up concerning for met prostate cancer  EPE/christa mets  Treatment  -UTI , retention, cysto neg, PSA 40.78 3/22 - MRI prostate, bone scan   - -chronic daniel in place  -PET Gallium on study -large mass in the prostate gland with extension into both seminal vesicles avid pelvic lymph node metastases perirectal, right external iliac bilateral common iliac increased expression and aortocaval lymph node  4/18/2022: started bicalutamide 30 days  4/25/2022: Biopsy reveals GG5 prostate cancer, GS 9  4/26/2022: Started ADT .  Seen by Rad onc , refused yovana/pred  -Started XRT end 9/2022 prostate and pelvis  -non compliant lost to follow up, and our clinic, last ADT 3/23  -rising PSA/ progression with mets to lung, initial PSA response and then rising January 2025  -7/10/24 ADT restarted , refused NHT, last ADT Eligard 1/6/25    Provider Team  Hawa Mercedes, JAKI-CNP   MD Iron Ware  PCP Soren Adame - Cardiology   Mark HARRINGTONsergey - rad onc    Other contributing history  microcytosis, Aflutter, urinary incontinence, uncontrolled HTN, ongoing issues with catheter / urinary tract issues , no longer following up with urology and no daniel placement    Interval history:  The patient is without any other major new symptoms.  Denies any worsening issues with his urinary symptoms.  Appetite and energy is otherwise well denies any issues with chest pain or chest tightness.  Denies any fevers, chills, easy bruising or bleeding denies.  Yeah I am writing      ROS:  10-pt ROS reviewed and negative except as mentioned above.    Medications: below was reviewed and is accurate  Current Outpatient Medications   Medication Instructions    ascorbic acid, vitamin C, 1,000 mg ER tablet 1 tablet, Daily    famotidine (Pepcid) 20 mg tablet 1 tablet, Daily    tamsulosin  (FLOMAX) 0.8 mg, oral, Daily    TURMERIC ORAL 1,000 mg, Daily    vitamin E acetate (VITAMIN E ORAL) 1 capsule, Daily        Detailed family history and social history reviewed in detail and updated per epic charting and in detail with the patient    Physical exam:  There were no vitals filed for this visit.    Phone visit      Radiology review  Reviewed in detail personally and for detail see results in EPIC  PET imagine - 6/17/24 - focal increase in prostate gland, c/w recurrent disease, lung nodules SUV 8/ 4.7, 3.7, increased update in the penis, increased uptake within the penis , mets implant vs urinary retention      Genomics Data    Laboratory review  Reviewed in detail personally and for detail see results in [facility]  Lab Results   Component Value Date    WBC 8.9 12/30/2024    HGB 13.4 (L) 12/30/2024    HCT 39.9 (L) 12/30/2024    MCV 77 (L) 12/30/2024     12/30/2024     Lab Results   Component Value Date    GLUCOSE 103 (H) 12/30/2024    CALCIUM 9.8 12/30/2024     12/30/2024    K 5.3 12/30/2024    CO2 30 12/30/2024     12/30/2024    BUN 13 12/30/2024    CREATININE 0.81 12/30/2024     Lab Results   Component Value Date    PSA 3.03 12/30/2024    PSA 1.7 09/25/2024    PSA 1.29 09/25/2024     Lab Results   Component Value Date    ALT 14 12/30/2024    AST 14 12/30/2024    ALKPHOS 62 12/30/2024    BILITOT 0.6 12/30/2024     Lab Results   Component Value Date    TESTOSTERONE <30 (L) 12/30/2024       ASSESSMENT AND PLAN     Prostate Cancer -patient is due for ADT March 17, 2025 PSA is rising follow-up imaging ordered  Discussed in detail with the need for repeat imaging unfortunately insurance did not cover the pet imaging and only will cover a CT and bone scan since he never had that done first did explain to get that scheduled will be hard to compare and then eventually will try to get a PET scan due to 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    discussed need while on ADT  maintain adequate cardiovascular health, exercise, dietary modifications,  bone health with calcium 1000 mg with vitamin D 400-800 international units      Skip Mehta MD  Senior Attending Physician/  in Medicine [facility] [employer]  [facility] / [facility]  Patient line [phone]  Fax [phone]

## 2025-01-30 NOTE — TELEPHONE ENCOUNTER
Scans were cancelled by the insurance company. Patient states insurance doesn't see why the scans are needed, so they don't want to cover prevention. RN will contact insurance company to figure out what is needed from our team.   Alisson Willoughby RN 01/30/25 12:49 PM

## 2025-01-31 ENCOUNTER — TELEMEDICINE (OUTPATIENT)
Dept: HEMATOLOGY/ONCOLOGY | Facility: HOSPITAL | Age: 70
End: 2025-01-31
Payer: COMMERCIAL

## 2025-01-31 DIAGNOSIS — C61 PROSTATE CANCER (MULTI): ICD-10-CM

## 2025-02-21 ENCOUNTER — APPOINTMENT (OUTPATIENT)
Dept: RADIOLOGY | Facility: HOSPITAL | Age: 70
End: 2025-02-21
Payer: COMMERCIAL

## 2025-03-07 ENCOUNTER — APPOINTMENT (OUTPATIENT)
Dept: HEMATOLOGY/ONCOLOGY | Facility: HOSPITAL | Age: 70
End: 2025-03-07
Payer: COMMERCIAL

## 2025-03-13 DIAGNOSIS — C61 MALIGNANT NEOPLASM OF PROSTATE (MULTI): Primary | ICD-10-CM

## 2025-03-14 ENCOUNTER — HOSPITAL ENCOUNTER (OUTPATIENT)
Dept: RADIOLOGY | Facility: HOSPITAL | Age: 70
Discharge: HOME | End: 2025-03-14
Payer: COMMERCIAL

## 2025-03-14 ENCOUNTER — LAB (OUTPATIENT)
Dept: LAB | Facility: HOSPITAL | Age: 70
End: 2025-03-14
Payer: COMMERCIAL

## 2025-03-14 DIAGNOSIS — C61 PROSTATE CANCER (MULTI): ICD-10-CM

## 2025-03-14 DIAGNOSIS — C61 MALIGNANT NEOPLASM OF PROSTATE (MULTI): ICD-10-CM

## 2025-03-14 LAB
ALBUMIN SERPL BCP-MCNC: 4.2 G/DL (ref 3.4–5)
ALP SERPL-CCNC: 68 U/L (ref 33–136)
ALT SERPL W P-5'-P-CCNC: 12 U/L (ref 10–52)
ANION GAP SERPL CALC-SCNC: 12 MMOL/L (ref 10–20)
AST SERPL W P-5'-P-CCNC: 14 U/L (ref 9–39)
BASOPHILS # BLD AUTO: 0.06 X10*3/UL (ref 0–0.1)
BASOPHILS NFR BLD AUTO: 0.6 %
BILIRUB SERPL-MCNC: 0.5 MG/DL (ref 0–1.2)
BUN SERPL-MCNC: 18 MG/DL (ref 6–23)
CALCIUM SERPL-MCNC: 10.1 MG/DL (ref 8.6–10.3)
CHLORIDE SERPL-SCNC: 104 MMOL/L (ref 98–107)
CO2 SERPL-SCNC: 30 MMOL/L (ref 21–32)
CREAT SERPL-MCNC: 0.86 MG/DL (ref 0.5–1.3)
EGFRCR SERPLBLD CKD-EPI 2021: >90 ML/MIN/1.73M*2
EOSINOPHIL # BLD AUTO: 0.18 X10*3/UL (ref 0–0.4)
EOSINOPHIL NFR BLD AUTO: 1.9 %
ERYTHROCYTE [DISTWIDTH] IN BLOOD BY AUTOMATED COUNT: 14.6 % (ref 11.5–14.5)
GLUCOSE SERPL-MCNC: 109 MG/DL (ref 74–99)
HCT VFR BLD AUTO: 37.1 % (ref 41–52)
HGB BLD-MCNC: 11.9 G/DL (ref 13.5–17.5)
IMM GRANULOCYTES # BLD AUTO: 0.04 X10*3/UL (ref 0–0.5)
IMM GRANULOCYTES NFR BLD AUTO: 0.4 % (ref 0–0.9)
LYMPHOCYTES # BLD AUTO: 2.55 X10*3/UL (ref 0.8–3)
LYMPHOCYTES NFR BLD AUTO: 26.5 %
MCH RBC QN AUTO: 25.1 PG (ref 26–34)
MCHC RBC AUTO-ENTMCNC: 32.1 G/DL (ref 32–36)
MCV RBC AUTO: 78 FL (ref 80–100)
MONOCYTES # BLD AUTO: 0.55 X10*3/UL (ref 0.05–0.8)
MONOCYTES NFR BLD AUTO: 5.7 %
NEUTROPHILS # BLD AUTO: 6.23 X10*3/UL (ref 1.6–5.5)
NEUTROPHILS NFR BLD AUTO: 64.9 %
NRBC BLD-RTO: 0 /100 WBCS (ref 0–0)
PLATELET # BLD AUTO: 487 X10*3/UL (ref 150–450)
POTASSIUM SERPL-SCNC: 4.1 MMOL/L (ref 3.5–5.3)
PROT SERPL-MCNC: 7.9 G/DL (ref 6.4–8.2)
PSA SERPL-MCNC: 4.61 NG/ML
RBC # BLD AUTO: 4.75 X10*6/UL (ref 4.5–5.9)
SODIUM SERPL-SCNC: 142 MMOL/L (ref 136–145)
TESTOST SERPL-MCNC: <30 NG/DL (ref 240–1000)
WBC # BLD AUTO: 9.6 X10*3/UL (ref 4.4–11.3)

## 2025-03-14 PROCEDURE — 2550000001 HC RX 255 CONTRASTS: Performed by: NURSE PRACTITIONER

## 2025-03-14 PROCEDURE — 36415 COLL VENOUS BLD VENIPUNCTURE: CPT

## 2025-03-14 PROCEDURE — 85025 COMPLETE CBC W/AUTO DIFF WBC: CPT

## 2025-03-14 PROCEDURE — 74177 CT ABD & PELVIS W/CONTRAST: CPT

## 2025-03-14 PROCEDURE — 84403 ASSAY OF TOTAL TESTOSTERONE: CPT

## 2025-03-14 PROCEDURE — 84153 ASSAY OF PSA TOTAL: CPT

## 2025-03-14 PROCEDURE — 84075 ASSAY ALKALINE PHOSPHATASE: CPT

## 2025-03-14 RX ADMIN — IOHEXOL 75 ML: 350 INJECTION, SOLUTION INTRAVENOUS at 09:59

## 2025-03-17 ENCOUNTER — APPOINTMENT (OUTPATIENT)
Dept: HEMATOLOGY/ONCOLOGY | Facility: HOSPITAL | Age: 70
End: 2025-03-17
Payer: COMMERCIAL

## 2025-03-18 ENCOUNTER — TELEPHONE (OUTPATIENT)
Dept: HEMATOLOGY/ONCOLOGY | Facility: CLINIC | Age: 70
End: 2025-03-18
Payer: COMMERCIAL

## 2025-03-18 NOTE — TELEPHONE ENCOUNTER
Reviewed in detail most recent CT scan hard to compare is not a PET scan and hard to compare response, hard to tell if true progression pulmonary nodule seen enlarged prostate gland does have staghorn renal calculi with mild right-sided hydro asymptomatic diffuse bladder wall thickening  For now we will keep follow-up appointment and discuss options in the future will discuss with urology if any intervention needed regarding kidney stones he is otherwise asymptomatic

## 2025-03-19 ENCOUNTER — TELEPHONE (OUTPATIENT)
Dept: HEMATOLOGY/ONCOLOGY | Facility: CLINIC | Age: 70
End: 2025-03-19
Payer: COMMERCIAL

## 2025-03-19 DIAGNOSIS — N20.0 RENAL CALCULI: ICD-10-CM

## 2025-03-19 NOTE — TELEPHONE ENCOUNTER
Discussed in detail with the patient regarding his kidney stones no flank pain but still ongoing urinary symptoms no hematuria I did discuss with urology who is working on trying to schedule the appointment he does have a follow-up scheduled with urology on April 8 but that is more with our oncological urologist and one of our urologist will be contacting him to discuss

## 2025-04-03 NOTE — PROGRESS NOTES
Oncology Follow up Note  Phone visit    Cancer History  Prostate Cancer - locally advanced / clinical Stage DARY - lost to f/up concerning for met prostate cancer  EPE/christa mets  Treatment  -UTI , retention, cysto neg, PSA 40.78 3/22 - MRI prostate, bone scan   - -chronic daniel in place  -PET Gallium on study -large mass in the prostate gland with extension into both seminal vesicles avid pelvic lymph node metastases perirectal, right external iliac bilateral common iliac increased expression and aortocaval lymph node  4/18/2022: started bicalutamide 30 days  4/25/2022: Biopsy reveals GG5 prostate cancer, GS 9  4/26/2022: Started ADT .  Seen by Rad onc , refused yovana/pred  -Started XRT end 9/2022 prostate and pelvis  -non compliant lost to follow up, and our clinic, last ADT 3/23  -rising PSA/ progression with mets to lung, initial PSA response and then rising January 2025  -7/10/24 ADT restarted , refused NHT, last ADT Eligard 1/6/25    Provider Team  Skip Mehta MD John W Horinger, MD Johnathan Shoag  PCP Soren Adame - Cardiology   Mark Barrett - rad onc    Other contributing history  microcytosis, Aflutter, urinary incontinence, uncontrolled HTN, ongoing issues with catheter / urinary tract issues , no longer following up with urology and no daniel placement, intermittent catheter    Interval history:  The patient is accompanied by his significant other still ongoing issues with lower urinary tract symptoms still ongoing issues with catheterization at night.  Denies any other major new symptoms.  Denies any nausea, vomiting, fevers, chills, easy bruising or bleeding denies any issues with chest pain or chest tightness.  Denies any melena or bright red blood per rectum.    ROS:  10-pt ROS reviewed and negative except as mentioned above.    Medications: below was reviewed and is accurate  Current Outpatient Medications   Medication Instructions    ascorbic acid,  "vitamin C, 1,000 mg ER tablet 1 tablet, Daily    famotidine (Pepcid) 20 mg tablet 1 tablet, Daily    tamsulosin (FLOMAX) 0.8 mg, oral, Daily    TURMERIC ORAL 1,000 mg, Daily    vitamin E acetate (VITAMIN E ORAL) 1 capsule, Daily        Detailed family history and social history reviewed in detail and updated per epic charting and in detail with the patient    Physical exam:  Vitals:    04/04/25 0922   BP: 148/80   BP Location: Left arm   Patient Position: Sitting   BP Cuff Size: Adult   Pulse: 82   Resp: 18   Temp: 36.7 °C (98.1 °F)   TempSrc: Temporal   SpO2: 94%   Weight: 61.9 kg (136 lb 7.4 oz)   Height: (S) 1.825 m (5' 11.85\")         GEN: NAD, well-appearing  SKIN: no concerning new lesions examined in upper / lower extremity   LUNGS: CTA  CV: RRR no clear R/G  ABD: Soft, NTND. No rebound or guarding.  EXT:  trace edema bilaterally   NEURO: Grossly intact. No focal deficits.  PSYCH: Normal mood and affect        Radiology review  Reviewed in detail personally and for detail see results in EPIC  PET imagine - 6/17/24 - focal increase in prostate gland, c/w recurrent disease, lung nodules SUV 8/ 4.7, 3.7, increased update in the penis, increased uptake within the penis , mets implant vs urinary retention  3/2025 - bilateral pulm nodules, 1.9 cm, c/w prior PET PSMA, enlarged prostate, bilateral staghorn renal calculi mild right hydro, 2.7 cm right, 1.8 cm left      Genomics Data    Laboratory review  Reviewed in detail personally and for detail see results in EPIC  Lab Results   Component Value Date    WBC 9.6 03/14/2025    HGB 11.9 (L) 03/14/2025    HCT 37.1 (L) 03/14/2025    MCV 78 (L) 03/14/2025     (H) 03/14/2025     Lab Results   Component Value Date    GLUCOSE 109 (H) 03/14/2025    CALCIUM 10.1 03/14/2025     03/14/2025    K 4.1 03/14/2025    CO2 30 03/14/2025     03/14/2025    BUN 18 03/14/2025    CREATININE 0.86 03/14/2025     Lab Results   Component Value Date    PSA 4.61 (H) 03/14/2025 "    PSA 3.03 12/30/2024    PSA 1.7 09/25/2024    PSA 1.29 09/25/2024     Lab Results   Component Value Date    ALT 12 03/14/2025    AST 14 03/14/2025    ALKPHOS 68 03/14/2025    BILITOT 0.5 03/14/2025     Lab Results   Component Value Date    TESTOSTERONE <30 (L) 03/14/2025       ASSESSMENT AND PLAN     Prostate Cancer -patient is due for ADT , unable to get PET PSMA, CT done, hard to tell progression, PSA continues to rise, options discussed, adding NHT, chemo etc, discussed also getting PET PSMA discussed also potential risk of progression and evolution into castrate resistant prostate cancer all options discussed in detail for now he wants to hold off and arrange follow-up in 1 month we will discuss options and then visit not interested in bone mineral density and/or genomics.    LUTS -refusing urology follow-up and did update urologist    Microcytosis -could refer to hematology wanted to hold off for now      Staghorn calculi - needs for urology eval, is scheduled to see urology fairly soon.    discussed need while on ADT  maintain adequate cardiovascular health, exercise, dietary modifications,  bone health with calcium 1000 mg with vitamin D 400-800 international units      Skip Mehta MD  Senior Attending Physician/  in Medicine [facility]  [facility] / [facility]  Patient line [phone]  Fax [phone]

## 2025-04-04 ENCOUNTER — OFFICE VISIT (OUTPATIENT)
Dept: HEMATOLOGY/ONCOLOGY | Facility: HOSPITAL | Age: 70
End: 2025-04-04
Payer: COMMERCIAL

## 2025-04-04 ENCOUNTER — INFUSION (OUTPATIENT)
Dept: HEMATOLOGY/ONCOLOGY | Facility: HOSPITAL | Age: 70
End: 2025-04-04
Payer: COMMERCIAL

## 2025-04-04 VITALS
SYSTOLIC BLOOD PRESSURE: 148 MMHG | TEMPERATURE: 98.1 F | OXYGEN SATURATION: 94 % | HEART RATE: 82 BPM | BODY MASS INDEX: 18.48 KG/M2 | RESPIRATION RATE: 18 BRPM | DIASTOLIC BLOOD PRESSURE: 80 MMHG | HEIGHT: 72 IN | WEIGHT: 136.47 LBS

## 2025-04-04 DIAGNOSIS — C61 PROSTATE CANCER (MULTI): ICD-10-CM

## 2025-04-04 PROCEDURE — 3077F SYST BP >= 140 MM HG: CPT | Performed by: INTERNAL MEDICINE

## 2025-04-04 PROCEDURE — 2500000004 HC RX 250 GENERAL PHARMACY W/ HCPCS (ALT 636 FOR OP/ED): Mod: JZ,TB | Performed by: INTERNAL MEDICINE

## 2025-04-04 PROCEDURE — 99214 OFFICE O/P EST MOD 30 MIN: CPT | Performed by: INTERNAL MEDICINE

## 2025-04-04 PROCEDURE — 1126F AMNT PAIN NOTED NONE PRSNT: CPT | Performed by: INTERNAL MEDICINE

## 2025-04-04 PROCEDURE — 3079F DIAST BP 80-89 MM HG: CPT | Performed by: INTERNAL MEDICINE

## 2025-04-04 PROCEDURE — 96402 CHEMO HORMON ANTINEOPL SQ/IM: CPT

## 2025-04-04 PROCEDURE — 3008F BODY MASS INDEX DOCD: CPT | Performed by: INTERNAL MEDICINE

## 2025-04-04 RX ORDER — ALBUTEROL SULFATE 0.83 MG/ML
3 SOLUTION RESPIRATORY (INHALATION) AS NEEDED
OUTPATIENT
Start: 2025-06-27

## 2025-04-04 RX ORDER — EPINEPHRINE 0.3 MG/.3ML
0.3 INJECTION SUBCUTANEOUS EVERY 5 MIN PRN
OUTPATIENT
Start: 2025-06-27

## 2025-04-04 RX ORDER — DIPHENHYDRAMINE HYDROCHLORIDE 50 MG/ML
50 INJECTION, SOLUTION INTRAMUSCULAR; INTRAVENOUS AS NEEDED
OUTPATIENT
Start: 2025-06-27

## 2025-04-04 RX ORDER — FAMOTIDINE 10 MG/ML
20 INJECTION, SOLUTION INTRAVENOUS ONCE AS NEEDED
OUTPATIENT
Start: 2025-06-27

## 2025-04-04 RX ADMIN — LEUPROLIDE ACETATE 22.5 MG: 22.5 INJECTION, SUSPENSION, EXTENDED RELEASE SUBCUTANEOUS at 10:01

## 2025-04-04 ASSESSMENT — PAIN SCALES - GENERAL: PAINLEVEL_OUTOF10: 0-NO PAIN

## 2025-04-04 NOTE — PROGRESS NOTES
Patient presents to infusion appointment in stable condition from clinic. Eligard injection tolerated without incident. Discharged in stable condition.

## 2025-04-08 ENCOUNTER — APPOINTMENT (OUTPATIENT)
Dept: UROLOGY | Facility: HOSPITAL | Age: 70
End: 2025-04-08
Payer: COMMERCIAL

## 2025-04-08 VITALS
BODY MASS INDEX: 18.16 KG/M2 | TEMPERATURE: 96.6 F | RESPIRATION RATE: 18 BRPM | SYSTOLIC BLOOD PRESSURE: 139 MMHG | WEIGHT: 133.38 LBS | OXYGEN SATURATION: 95 % | HEART RATE: 111 BPM | DIASTOLIC BLOOD PRESSURE: 80 MMHG

## 2025-04-08 DIAGNOSIS — C61 PROSTATE CANCER (MULTI): ICD-10-CM

## 2025-04-08 PROCEDURE — 3075F SYST BP GE 130 - 139MM HG: CPT | Performed by: STUDENT IN AN ORGANIZED HEALTH CARE EDUCATION/TRAINING PROGRAM

## 2025-04-08 PROCEDURE — 1125F AMNT PAIN NOTED PAIN PRSNT: CPT | Performed by: STUDENT IN AN ORGANIZED HEALTH CARE EDUCATION/TRAINING PROGRAM

## 2025-04-08 PROCEDURE — 3079F DIAST BP 80-89 MM HG: CPT | Performed by: STUDENT IN AN ORGANIZED HEALTH CARE EDUCATION/TRAINING PROGRAM

## 2025-04-08 PROCEDURE — 99214 OFFICE O/P EST MOD 30 MIN: CPT | Performed by: STUDENT IN AN ORGANIZED HEALTH CARE EDUCATION/TRAINING PROGRAM

## 2025-04-08 PROCEDURE — 1159F MED LIST DOCD IN RCRD: CPT | Performed by: STUDENT IN AN ORGANIZED HEALTH CARE EDUCATION/TRAINING PROGRAM

## 2025-04-08 ASSESSMENT — PAIN SCALES - GENERAL: PAINLEVEL_OUTOF10: 4

## 2025-04-08 NOTE — PROGRESS NOTES
Subjective    Paul Bruno is a 70 y.o. male with metastatic prostate cancer and obstructive urinary symptoms with kidney and bladder stones referred by medical oncology.     Had radiation therapy to his prostate found when he went into urinary retention in 2022.   He is only being managed with ADT currently and never has been on an JOSE ALBERTO.    He catheterizes at night. He reports ongoing difficulty urinating for years. He expresses his goal is to improve his quality of life.  He is frustrated because he has had these LUTS for years.    At diagnosis:  PSA 40  PET Gallium on study -large mass in the prostate gland with extension into both seminal vesicles avid pelvic lymph node metastases perirectal, right external iliac bilateral common iliac increased expression and aortocaval lymph   At that time he had a mass in his prostate, his PSA started rising with progression with mets to lungs.   Biopsy revealed GG5.     Started ADT and radiation therapy to the prostate in 2022    Now just ADT.    He is here for large prostate urinary symptoms, kidney, and bladder stones.       PMHx:  Hypertension, Personal history of irradiation, and Prostate cancer       PSHx:  has a past surgical history that includes Other surgical history (07/27/2022).      Social:   Tobacco Use: High Risk (4/8/2025)    Patient History     Smoking Tobacco Use: Every Day     Smokeless Tobacco Use: Never     Passive Exposure: Not on file         Family: Cancer-related family history is not on file.        Review of Systems    All systems were reviewed. Anything negative was noted in the HPI.    Objective   Physical Exam  Gen: No acute distress      Psych: Alert and oriented x3      Neuro:  Normal ROM     Resp: Nonlabored respirations      CV: Regular rate and rhythm      Abd: S, NT, ND.     : Deferred     Skin: Warm, dry and intact without rashes      Lymphatics: No peripheral edema           Past Medical History:   Diagnosis Date    Hypertension      Personal history of irradiation     RT in 10/27/22, 28 fractions to prostate    Prostate cancer (Multi)          Past Surgical History:   Procedure Laterality Date    OTHER SURGICAL HISTORY  07/27/2022    Prostate needle biopsy         Assessment & Plan  Prostate cancer (Multi)  70 y.o. male with metastatic prostate cancer and obstructive urinary symptoms with kidney and bladder stones.    I reviewed his imaging.  He has large volume partial staghorn stones, bladder stones and an enlarged prostate.    We went over treatment for these issues.  For the kidney stones, this would require staged PCNLs due to bilateral nature and stone burden.  We would do this to reduce potential for growth, infection, obstruction and renal deterioration.    We discussed the bladder stones which are likely due to incompletely emptying from his known LUTS.  We went over cystolithopaxy for that.  We went over how we normally treat the outlet when these stones develop but in patients post XRT incontinence is a sig risk.    He understands that I do not do any of these procedures.  I have discussed the case with Dr. Clark who is our stone expert.  He recommends the above.  I will get him over to Dr. Clark to discuss.      PLAN:   Staghorn calculi -  percutaneous nephrolithotomy & cystolitholapaxy to remove bladder stones with considereation of a minor TURP to help bladder symtoms. We discussed r/b/a.  Referral to Dr. Clark.    Orders:    Referral to Urology    Referral to Urology; Future                               Scribe Attestation  By signing my name below, I, Sabrina Adrian, [place]   attest that this documentation has been prepared under the direction and in the presence of Benoit Angel MD MPH

## 2025-04-08 NOTE — ASSESSMENT & PLAN NOTE
70 y.o. male with metastatic prostate cancer and obstructive urinary symptoms with kidney and bladder stones.    I reviewed his imaging.  He has large volume partial staghorn stones, bladder stones and an enlarged prostate.    We went over treatment for these issues.  For the kidney stones, this would require staged PCNLs due to bilateral nature and stone burden.  We would do this to reduce potential for growth, infection, obstruction and renal deterioration.    We discussed the bladder stones which are likely due to incompletely emptying from his known LUTS.  We went over cystolithopaxy for that.  We went over how we normally treat the outlet when these stones develop but in patients post XRT incontinence is a sig risk.    He understands that I do not do any of these procedures.  I have discussed the case with Dr. Clark who is our stone expert.  He recommends the above.  I will get him over to Dr. Clark to discuss.      PLAN:   Staghorn calculi -  percutaneous nephrolithotomy & cystolitholapaxy to remove bladder stones with considereation of a minor TURP to help bladder symtoms. We discussed r/b/a.  Referral to Dr. Clark.    Orders:    Referral to Urology    Referral to Urology; Future

## 2025-04-26 DIAGNOSIS — R39.12 WEAK URINARY STREAM: ICD-10-CM

## 2025-04-28 ENCOUNTER — LAB (OUTPATIENT)
Dept: LAB | Facility: HOSPITAL | Age: 70
End: 2025-04-28
Payer: COMMERCIAL

## 2025-04-28 DIAGNOSIS — C61 PROSTATE CANCER (MULTI): ICD-10-CM

## 2025-04-28 LAB
ALBUMIN SERPL BCP-MCNC: 3.9 G/DL (ref 3.4–5)
ALP SERPL-CCNC: 103 U/L (ref 33–136)
ALT SERPL W P-5'-P-CCNC: 10 U/L (ref 10–52)
ANION GAP SERPL CALC-SCNC: 15 MMOL/L (ref 10–20)
AST SERPL W P-5'-P-CCNC: 11 U/L (ref 9–39)
BASOPHILS # BLD AUTO: 0.06 X10*3/UL (ref 0–0.1)
BASOPHILS NFR BLD AUTO: 0.6 %
BILIRUB SERPL-MCNC: 0.4 MG/DL (ref 0–1.2)
BUN SERPL-MCNC: 16 MG/DL (ref 6–23)
CALCIUM SERPL-MCNC: 10.2 MG/DL (ref 8.6–10.3)
CHLORIDE SERPL-SCNC: 103 MMOL/L (ref 98–107)
CO2 SERPL-SCNC: 27 MMOL/L (ref 21–32)
CREAT SERPL-MCNC: 0.92 MG/DL (ref 0.5–1.3)
EGFRCR SERPLBLD CKD-EPI 2021: 88 ML/MIN/1.73M*2
EOSINOPHIL # BLD AUTO: 0.14 X10*3/UL (ref 0–0.4)
EOSINOPHIL NFR BLD AUTO: 1.4 %
ERYTHROCYTE [DISTWIDTH] IN BLOOD BY AUTOMATED COUNT: 15.7 % (ref 11.5–14.5)
GLUCOSE SERPL-MCNC: 111 MG/DL (ref 74–99)
HCT VFR BLD AUTO: 33.8 % (ref 41–52)
HGB BLD-MCNC: 10.5 G/DL (ref 13.5–17.5)
IMM GRANULOCYTES # BLD AUTO: 0.04 X10*3/UL (ref 0–0.5)
IMM GRANULOCYTES NFR BLD AUTO: 0.4 % (ref 0–0.9)
LYMPHOCYTES # BLD AUTO: 1.78 X10*3/UL (ref 0.8–3)
LYMPHOCYTES NFR BLD AUTO: 17.3 %
MCH RBC QN AUTO: 24.4 PG (ref 26–34)
MCHC RBC AUTO-ENTMCNC: 31.1 G/DL (ref 32–36)
MCV RBC AUTO: 79 FL (ref 80–100)
MONOCYTES # BLD AUTO: 0.46 X10*3/UL (ref 0.05–0.8)
MONOCYTES NFR BLD AUTO: 4.5 %
NEUTROPHILS # BLD AUTO: 7.8 X10*3/UL (ref 1.6–5.5)
NEUTROPHILS NFR BLD AUTO: 75.8 %
NRBC BLD-RTO: 0 /100 WBCS (ref 0–0)
PLATELET # BLD AUTO: 824 X10*3/UL (ref 150–450)
POTASSIUM SERPL-SCNC: 4 MMOL/L (ref 3.5–5.3)
PROT SERPL-MCNC: 8.2 G/DL (ref 6.4–8.2)
PSA SERPL-MCNC: 7.65 NG/ML
RBC # BLD AUTO: 4.3 X10*6/UL (ref 4.5–5.9)
SODIUM SERPL-SCNC: 141 MMOL/L (ref 136–145)
TESTOST SERPL-MCNC: <30 NG/DL (ref 240–1000)
WBC # BLD AUTO: 10.3 X10*3/UL (ref 4.4–11.3)

## 2025-04-28 PROCEDURE — 84153 ASSAY OF PSA TOTAL: CPT

## 2025-04-28 PROCEDURE — 36415 COLL VENOUS BLD VENIPUNCTURE: CPT

## 2025-04-28 PROCEDURE — 84403 ASSAY OF TOTAL TESTOSTERONE: CPT

## 2025-04-28 PROCEDURE — 84075 ASSAY ALKALINE PHOSPHATASE: CPT

## 2025-04-28 PROCEDURE — 85025 COMPLETE CBC W/AUTO DIFF WBC: CPT

## 2025-04-29 RX ORDER — TAMSULOSIN HYDROCHLORIDE 0.4 MG/1
0.8 CAPSULE ORAL DAILY
Qty: 180 CAPSULE | OUTPATIENT
Start: 2025-04-29

## 2025-05-01 NOTE — PROGRESS NOTES
Oncology Follow up Note  Phone visit    Cancer History  Prostate Cancer - locally advanced / clinical Stage DARY - lost to f/up concerning for met prostate cancer  EPE/christa mets  Treatment  -UTI , retention, cysto neg, PSA 40.78 3/22 - MRI prostate, bone scan   - -chronic daniel in place  -PET Gallium on study -large mass in the prostate gland with extension into both seminal vesicles avid pelvic lymph node metastases perirectal, right external iliac bilateral common iliac increased expression and aortocaval lymph node  4/18/2022: started bicalutamide 30 days  4/25/2022: Biopsy reveals GG5 prostate cancer, GS 9  4/26/2022: Started ADT .  Seen by Rad onc , refused yovana/pred  -Started XRT end 9/2022 prostate and pelvis  -non compliant lost to follow up, and our clinic, last ADT 3/23  -rising PSA/ progression with mets to lung, initial PSA response and then rising January 2025  -7/10/24 ADT restarted , refused NHT, last ADT [facility] 4/4/25      Provider Team  Skip Mehta MD John W Horinger, MD Johnathan Shoag  PCP Soren Adame - Cardiology   Mark Barrett - rad onc    Other contributing history  microcytosis, Aflutter, urinary incontinence, uncontrolled HTN, ongoing issues with catheter / urinary tract issues , no longer following up with urology and no daniel placement, intermittent catheter    Interval history:  The patient is without major symptoms except for ongoing issues with urinary symptoms and flank pain and flank tenderness to see urology unfortunately his appointment with urology is not scheduled until October for his kidney stones.  Hot flashes are stable.  He denies any other major new complaints denies any bone pain tenderness or worsening pulmonary symptoms.  Denies denies any nausea, vomiting, fevers, chills or other new complaint plaints.  He is accompanied by family.    ROS:  10-pt ROS reviewed and negative except as mentioned above.    Medications: below  was reviewed and is accurate  Current Outpatient Medications   Medication Instructions    ascorbic acid, vitamin C, 1,000 mg ER tablet 1 tablet, Daily    famotidine (Pepcid) 20 mg tablet 1 tablet, Daily    tamsulosin (FLOMAX) 0.8 mg, oral, Daily    TURMERIC ORAL 1,000 mg, Daily    vitamin E acetate (VITAMIN E ORAL) 1 capsule, Daily        Detailed family history and social history reviewed in detail and updated per epic charting and in detail with the patient    Physical exam:  Vitals:    05/02/25 0929   BP: 141/68   BP Location: Left arm   Patient Position: Sitting   BP Cuff Size: Adult   Pulse: 93   Resp: 18   Temp: 36.9 °C (98.4 °F)   TempSrc: Temporal   SpO2: 93%   Weight: 58.9 kg (129 lb 13.6 oz)           GEN: NAD, well-appearing  SKIN: no concerning new lesions examined in upper / lower extremity   LUNGS: CTA  CV: RRR no clear R/G  ABD: Soft, NTND. No rebound or guarding.  EXT:  trace edema bilaterally   NEURO: Grossly intact. No focal deficits.  PSYCH: Normal mood and affect        Radiology review  Reviewed in detail personally and for detail see results in EPIC  PET imagine - 6/17/24 - focal increase in prostate gland, c/w recurrent disease, lung nodules SUV 8/ 4.7, 3.7, increased update in the penis, increased uptake within the penis , mets implant vs urinary retention  3/2025 - bilateral pulm nodules, 1.9 cm, c/w prior PET PSMA, enlarged prostate, bilateral staghorn renal calculi mild right hydro, 2.7 cm right, 1.8 cm left      Genomics Data    Laboratory review  Reviewed in detail personally and for detail see results in [facility]  Lab Results   Component Value Date    WBC 10.3 04/28/2025    HGB 10.5 (L) 04/28/2025    HCT 33.8 (L) 04/28/2025    MCV 79 (L) 04/28/2025     (H) 04/28/2025     Lab Results   Component Value Date    GLUCOSE 111 (H) 04/28/2025    CALCIUM 10.2 04/28/2025     04/28/2025    K 4.0 04/28/2025    CO2 27 04/28/2025     04/28/2025    BUN 16 04/28/2025    CREATININE 0.92  04/28/2025     Lab Results   Component Value Date    PSA 7.65 (H) 04/28/2025    PSA 4.61 (H) 03/14/2025    PSA 3.03 12/30/2024     Lab Results   Component Value Date    ALT 10 04/28/2025    AST 11 04/28/2025    ALKPHOS 103 04/28/2025    BILITOT 0.4 04/28/2025     Lab Results   Component Value Date    TESTOSTERONE <30 (L) 04/28/2025       ASSESSMENT AND PLAN     Prostate Cancer -next ADT due 6/27/25,see prior notes for details,  , PSA continued to rise, options discussed, interested in bone mineral density and/or genomics.  He is still not interested in any systemic therapy including the role of abiraterone and Xtandi and/or other options all options discussed including the risk of progression he again wants to hold off and is aware of the risks urological intervention first and then decide next steps we will go ahead and arrange for him to come see us on June 27, 2022 for his injection aware the risk of progression and labs at that time and continue to monitor closely.    LUTS / Staghorn calculi-did see urology, planning referral to Dr Clark for stone procedures yet not scheduled for some time, 10/2025, did reach out to urology team and they are going to schedule him in to be seen sooner to address this first and then hopefully if interventions are needed may help with his urological symptoms.    Microcytosis  and likely reactive Thrombocytosis -could refer to hematology wanted to hold off for now, unclear if reactive potential iron deficiency he was started on iron we will get iron labs prior to next visit continue to monitor closely and if worsening will refer to hematology during next visit.  If platelet count continues to rise obviously concern exists for potential thrombotic event and/or aspirin potential benefit he will contact us with any worsening symptoms.      discussed need while on ADT  maintain adequate cardiovascular health, exercise, dietary modifications,  bone health with calcium 1000 mg with  vitamin D 400-800 international units      Skip Mehta MD  Senior Attending Physician/  in Medicine [facility] [employer]  [facility] / [facility]  Patient line [phone]  Fax [phone]

## 2025-05-02 ENCOUNTER — OFFICE VISIT (OUTPATIENT)
Dept: HEMATOLOGY/ONCOLOGY | Facility: HOSPITAL | Age: 70
End: 2025-05-02
Payer: COMMERCIAL

## 2025-05-02 VITALS
SYSTOLIC BLOOD PRESSURE: 141 MMHG | HEART RATE: 93 BPM | BODY MASS INDEX: 17.68 KG/M2 | WEIGHT: 129.85 LBS | OXYGEN SATURATION: 93 % | RESPIRATION RATE: 18 BRPM | DIASTOLIC BLOOD PRESSURE: 68 MMHG | TEMPERATURE: 98.4 F

## 2025-05-02 DIAGNOSIS — C61 PROSTATE CANCER (MULTI): ICD-10-CM

## 2025-05-02 DIAGNOSIS — R39.12 WEAK URINARY STREAM: ICD-10-CM

## 2025-05-02 PROCEDURE — 3077F SYST BP >= 140 MM HG: CPT | Performed by: INTERNAL MEDICINE

## 2025-05-02 PROCEDURE — 1126F AMNT PAIN NOTED NONE PRSNT: CPT | Performed by: INTERNAL MEDICINE

## 2025-05-02 PROCEDURE — 99214 OFFICE O/P EST MOD 30 MIN: CPT | Performed by: INTERNAL MEDICINE

## 2025-05-02 PROCEDURE — 3078F DIAST BP <80 MM HG: CPT | Performed by: INTERNAL MEDICINE

## 2025-05-02 RX ORDER — TAMSULOSIN HYDROCHLORIDE 0.4 MG/1
0.8 CAPSULE ORAL DAILY
Qty: 60 CAPSULE | Refills: 2 | Status: SHIPPED | OUTPATIENT
Start: 2025-05-02

## 2025-05-02 ASSESSMENT — PAIN SCALES - GENERAL: PAINLEVEL_OUTOF10: 0-NO PAIN

## 2025-05-05 ENCOUNTER — TELEPHONE (OUTPATIENT)
Dept: UROLOGY | Facility: CLINIC | Age: 70
End: 2025-05-05
Payer: COMMERCIAL

## 2025-05-06 ENCOUNTER — APPOINTMENT (OUTPATIENT)
Dept: UROLOGY | Facility: CLINIC | Age: 70
End: 2025-05-06
Payer: COMMERCIAL

## 2025-05-06 ENCOUNTER — HOSPITAL ENCOUNTER (OUTPATIENT)
Dept: RADIOLOGY | Facility: HOSPITAL | Age: 70
Discharge: HOME | End: 2025-05-06
Payer: COMMERCIAL

## 2025-05-06 ENCOUNTER — HOSPITAL ENCOUNTER (OUTPATIENT)
Facility: HOSPITAL | Age: 70
Setting detail: OUTPATIENT SURGERY
End: 2025-05-06
Attending: SURGERY | Admitting: SURGERY
Payer: COMMERCIAL

## 2025-05-06 DIAGNOSIS — N21.0 BLADDER STONE: ICD-10-CM

## 2025-05-06 DIAGNOSIS — N20.0 RENAL CALCULI: Primary | ICD-10-CM

## 2025-05-06 DIAGNOSIS — N20.0 RENAL CALCULI: ICD-10-CM

## 2025-05-06 PROCEDURE — 1160F RVW MEDS BY RX/DR IN RCRD: CPT | Performed by: SURGERY

## 2025-05-06 PROCEDURE — 74018 RADEX ABDOMEN 1 VIEW: CPT | Performed by: RADIOLOGY

## 2025-05-06 PROCEDURE — 74018 RADEX ABDOMEN 1 VIEW: CPT

## 2025-05-06 PROCEDURE — 99213 OFFICE O/P EST LOW 20 MIN: CPT | Performed by: SURGERY

## 2025-05-06 PROCEDURE — 1125F AMNT PAIN NOTED PAIN PRSNT: CPT | Performed by: SURGERY

## 2025-05-06 PROCEDURE — 1159F MED LIST DOCD IN RCRD: CPT | Performed by: SURGERY

## 2025-05-06 RX ORDER — PNV NO.95/FERROUS FUM/FOLIC AC 28MG-0.8MG
100 TABLET ORAL DAILY
COMMUNITY

## 2025-05-06 ASSESSMENT — PAIN SCALES - GENERAL: PAINLEVEL_OUTOF10: 2

## 2025-05-06 NOTE — PROGRESS NOTES
Subjective   Patient ID: Paul Bruno is a 70 y.o. male who presents for Nephrolithiasis.    HPI  He was referred to me by Dr. Sage.  He has a history of advanced prostate cancer.  He previously completed SBRT.  He has now on ADT.  Recent abdominal imaging revealed bilateral large renal calculi, and several bladder stones.  He does have BPH with LUTS.  He is currently on Flomax.  He is here to discuss stone treatment options.              Objective   Physical Exam  Well nourished, thin  Abdomen soft, ND, NT                Assessment/Plan   Problem List Items Addressed This Visit           ICD-10-CM       Genitourinary and Reproductive    Renal calculi - Primary N20.0    Relevant Orders    Case Request Operating Room: NEPHROLITHOTOMY, PERCUTANEOUS (Completed)     Other Visit Diagnoses         Codes      Bladder stone     N21.0             I reviewed his recent CT abdomen and pelvis.  He has a large staghorn stone within the right kidney occupying a good portion of the collecting system (> 3 cm).  He does have left-sided renal stones, these measure less than 2 cm.  He also has several bladder stones.  I informed him that the best option is to do a staged approach.  I will for schedule him for a right PCNL.  I counseled him on the risks and benefits of surgery.  Once the right kidney is treated, then we can proceed with treating the bladder stones.  We may consider an outlet procedure in the same setting.          Sanket Willis MD 05/06/25 11:33 AM

## 2025-05-07 DIAGNOSIS — N20.0 RENAL CALCULI: ICD-10-CM

## 2025-05-09 ENCOUNTER — TELEPHONE (OUTPATIENT)
Dept: UROLOGY | Facility: CLINIC | Age: 70
End: 2025-05-09
Payer: COMMERCIAL

## 2025-05-09 DIAGNOSIS — N30.00 ACUTE CYSTITIS WITHOUT HEMATURIA: ICD-10-CM

## 2025-05-09 RX ORDER — SULFAMETHOXAZOLE AND TRIMETHOPRIM 800; 160 MG/1; MG/1
1 TABLET ORAL 2 TIMES DAILY
Qty: 20 TABLET | Refills: 0 | Status: SHIPPED | OUTPATIENT
Start: 2025-05-09 | End: 2025-05-19

## 2025-05-22 ENCOUNTER — PRE-ADMISSION TESTING (OUTPATIENT)
Dept: PREADMISSION TESTING | Facility: HOSPITAL | Age: 70
End: 2025-05-22
Payer: COMMERCIAL

## 2025-05-22 VITALS
WEIGHT: 131.5 LBS | RESPIRATION RATE: 16 BRPM | HEIGHT: 74 IN | TEMPERATURE: 97.2 F | SYSTOLIC BLOOD PRESSURE: 151 MMHG | HEART RATE: 89 BPM | DIASTOLIC BLOOD PRESSURE: 87 MMHG | BODY MASS INDEX: 16.88 KG/M2 | OXYGEN SATURATION: 97 %

## 2025-05-22 PROCEDURE — 99204 OFFICE O/P NEW MOD 45 MIN: CPT

## 2025-05-22 RX ORDER — MULTIVIT-MIN/IRON FUM/FOLIC AC 7.5 MG-4
1 TABLET ORAL DAILY
COMMUNITY

## 2025-05-22 RX ORDER — ACETAMINOPHEN 500 MG
1000 TABLET ORAL EVERY 8 HOURS PRN
COMMUNITY

## 2025-05-22 RX ORDER — FERROUS SULFATE 325(65) MG
325 TABLET ORAL
COMMUNITY

## 2025-05-22 ASSESSMENT — ENCOUNTER SYMPTOMS
RESPIRATORY NEGATIVE: 1
ENDOCRINE NEGATIVE: 1
CARDIOVASCULAR NEGATIVE: 1
PSYCHIATRIC NEGATIVE: 1
NEUROLOGICAL NEGATIVE: 1
CONSTITUTIONAL NEGATIVE: 1
ALLERGIC/IMMUNOLOGIC NEGATIVE: 1
GASTROINTESTINAL NEGATIVE: 1
HEMATOLOGIC/LYMPHATIC NEGATIVE: 1
EYES NEGATIVE: 1
FLANK PAIN: 1

## 2025-05-22 ASSESSMENT — DUKE ACTIVITY SCORE INDEX (DASI)
CAN YOU HAVE SEXUAL RELATIONS: YES
TOTAL_SCORE: 50.7
CAN YOU PARTICIPATE IN MODERATE RECREATIONAL ACTIVITIES LIKE GOLF, BOWLING, DANCING, DOUBLES TENNIS OR THROWING A BASEBALL OR FOOTBALL: YES
CAN YOU DO MODERATE WORK AROUND THE HOUSE LIKE VACUUMING, SWEEPING FLOORS OR CARRYING GROCERIES: YES
CAN YOU RUN A SHORT DISTANCE: YES
CAN YOU TAKE CARE OF YOURSELF (EAT, DRESS, BATHE, OR USE TOILET): YES
CAN YOU PARTICIPATE IN STRENOUS SPORTS LIKE SWIMMING, SINGLES TENNIS, FOOTBALL, BASKETBALL, OR SKIING: NO
DASI METS SCORE: 9
CAN YOU CLIMB A FLIGHT OF STAIRS OR WALK UP A HILL: YES
CAN YOU WALK A BLOCK OR TWO ON LEVEL GROUND: YES
CAN YOU DO HEAVY WORK AROUND THE HOUSE LIKE SCRUBBING FLOORS OR LIFTING AND MOVING HEAVY FURNITURE: YES
CAN YOU DO LIGHT WORK AROUND THE HOUSE LIKE DUSTING OR WASHING DISHES: YES
CAN YOU WALK INDOORS, SUCH AS AROUND YOUR HOUSE: YES
CAN YOU DO YARD WORK LIKE RAKING LEAVES, WEEDING OR PUSHING A MOWER: YES

## 2025-05-22 ASSESSMENT — PAIN SCALES - GENERAL: PAINLEVEL_OUTOF10: 4

## 2025-05-22 ASSESSMENT — PAIN - FUNCTIONAL ASSESSMENT: PAIN_FUNCTIONAL_ASSESSMENT: 0-10

## 2025-05-22 ASSESSMENT — PAIN DESCRIPTION - DESCRIPTORS: DESCRIPTORS: ACHING;SHARP

## 2025-05-22 NOTE — CPM/PAT H&P
CPM/PAT Evaluation       Name: Paul Bruno (Paul Bruno)  /Age: 1955/70 y.o.     In-Person       Chief Complaint: Renal calculi     HPI: Paul Bruno is a 70 year old male with a history of prostate cancer. He previously completed SBRT. He has now on ADT. Recent abdominal imaging revealed bilateral large renal calculi, and several bladder stones. He does have BPH with LUTS. He denies current blood in the urine, abdominal pain , or pelvic pain. He states he does have aching in the right flank. He is scheduled for a right nephrolithotomy. He denies fever, chills, nausea, or vomiting.     Medical History[1]    Surgical History[2]    Social History     Tobacco Use    Smoking status: Every Day     Average packs/day: 0.5 packs/day for 46.0 years (23.0 ttl pk-yrs)     Types: Cigars, Cigarettes     Start date:     Smokeless tobacco: Never   Substance Use Topics    Alcohol use: Yes     Alcohol/week: 1.0 standard drink of alcohol     Types: 1 Cans of beer per week     Social History     Substance and Sexual Activity   Drug Use Yes    Types: Marijuana    Comment: SMOKE DAILY         Family History[3]    Allergies[4]  Current Outpatient Medications   Medication Sig Dispense Refill    acetaminophen (Tylenol) 500 mg tablet Take 2 tablets (1,000 mg) by mouth every 8 hours if needed for mild pain (1 - 3) or moderate pain (4 - 6).      ascorbic acid, vitamin C, 1,000 mg ER tablet Take 1 tablet (1,000 mg) by mouth once daily.      aspirin-acetaminophen-caffeine (Excedrin Migraine) 250-250-65 mg tablet Take 2 tablets by mouth every 6 hours if needed for headaches or mild pain (1 - 3).      cyanocobalamin (Vitamin B-12) 100 mcg tablet Take 1 tablet (100 mcg) by mouth once daily.      tamsulosin (Flomax) 0.4 mg 24 hr capsule Take 2 capsules (0.8 mg) by mouth once daily. 60 capsule 2    TURMERIC ORAL Take 1,000 mg by mouth once daily.      vitamin E acetate (VITAMIN E ORAL) Take 1 capsule by mouth once daily. vitamin E  "180 mg oral capsule       No current facility-administered medications for this visit.          Review of Systems   Constitutional: Negative.    HENT: Negative.     Eyes: Negative.    Respiratory: Negative.     Cardiovascular: Negative.    Gastrointestinal: Negative.    Endocrine: Negative.    Genitourinary:  Positive for flank pain.   Skin: Negative.    Allergic/Immunologic: Negative.    Neurological: Negative.    Hematological: Negative.    Psychiatric/Behavioral: Negative.             Physical Exam  Vitals reviewed.   Constitutional:       Appearance: Normal appearance.   HENT:      Head: Normocephalic and atraumatic.      Nose: Nose normal.      Mouth/Throat:      Mouth: Mucous membranes are moist.      Pharynx: Oropharynx is clear.   Eyes:      Extraocular Movements: Extraocular movements intact.      Conjunctiva/sclera: Conjunctivae normal.   Cardiovascular:      Rate and Rhythm: Normal rate and regular rhythm.      Pulses: Normal pulses.      Heart sounds: Normal heart sounds.   Pulmonary:      Effort: Pulmonary effort is normal.      Breath sounds: Normal breath sounds.   Abdominal:      General: Bowel sounds are normal.      Palpations: Abdomen is soft.   Genitourinary:     Comments: Assessment deferred to physician  Musculoskeletal:         General: Normal range of motion.      Cervical back: Normal range of motion and neck supple.   Skin:     General: Skin is warm and dry.   Neurological:      General: No focal deficit present.      Mental Status: He is alert and oriented to person, place, and time.   Psychiatric:         Mood and Affect: Mood normal.         Behavior: Behavior normal.         Thought Content: Thought content normal.         Judgment: Judgment normal.          PAT AIRWAY:   Airway:     Mallampati::  II    TM distance::  >3 FB    Neck ROM::  Full  normal          Visit Vitals  /87   Pulse 89   Temp 36.2 °C (97.2 °F) (Temporal)   Resp 16   Ht 1.88 m (6' 2\")   Wt 59.6 kg (131 lb 8 oz) "   SpO2 97%   BMI 16.88 kg/m²   Smoking Status Every Day   BSA 1.76 m²   ASA: III  CHADS2: 2.8%  RCRI: 0.4%  DASI: 50.7  METS: 9  STOP BAN          Assessment and Plan:     Renal calculi : NEPHROLITHOTOMY, PERCUTANEOUS   Hypertension: Patient states his blood pressure has been high from him not being able to urinate at times. He is not on any medication.   BPH/Prostate cancer: Flomax  BMI: 16.88      LABS: 25    ROLF Low         [1]   Past Medical History:  Diagnosis Date    Adverse effect of anesthesia     URINARY RETENTION    BPH (benign prostatic hyperplasia)     Hypertension     Personal history of irradiation     RT in 10/27/22,  fractions to prostate    Prostate cancer (Multi)    [2]   Past Surgical History:  Procedure Laterality Date    OTHER SURGICAL HISTORY  2022    Prostate needle biopsy   [3]   Family History  Problem Relation Name Age of Onset    Hypertension Other grandparent    [4] No Known Allergies

## 2025-05-22 NOTE — PREPROCEDURE INSTRUCTIONS
Deakathy Miller,     Thank you for choosing [facility] or for your procedure. Being prepared for your procedure can help reduce anxiety.     In order to avoid delay or cancellation of your procedure, please take note of the following instructions to ensure you have a smooth experience.     If there have been any changes to your insurance, address, or phone number please call us immediately.  Continue to take your medications as prescribed by your primary care provider. Certain medications may need to be stopped before your procedure/surgery. Your prescribing doctor or surgeon will let you know. If you have any questions or concerns about the current medications you are taking, please talk to your primary care provider.  Let your surgeon know if there is a change in your health or you have a cold/flu, fever, sore throat, or cough.  Stop smoking, using street drugs, or consuming excessive alcohol.  Coordinate to have a responsible adult accompany you home after your procedure.     TWO DAYS BEFORE YOUR PROCEDURE          DO NOT SHAVE OR USE HAIR REMOVING PRODUCTS     ONE DAY BEFORE YOUR PROCEDURE     Complete your prep as instructed by Sanket Willis MD.  Do not eat or drink after midnight before your procedure, with the exception of water. You may drink water up to 2 hours before the procedure unless otherwise instructed.  You will receive a call to indicate arrival time prior to your procedure scheduled time.  Do not bring valuables to your procedure.  Do not wear jewelry to your procedure.  If advised by your prescribing doctor or surgeon, shower using Hibiclens (instructions below).                          Hibiclens instructions for the night before procedure/surgery AND the morning of procedure/surgery     Wash your hair with regular shampoo and rinse well.  Rinse your body with water.  Shower from the neck down with HIBICLENS soap.  Do NOT use Hibiclens on face, head/hair, or genitals.  For sensitive skin, test on  your inner wrist first.  Discontinue use if you get a rash or are allergic to Chlorhexidine.  Rinse well.  Dry with a clean towel.     **After showering, do NOT apply hair products, lotions, powders, creams, makeup, nail polish, Vaseline, or deodorant.**     DAY OF PROCEDURE     Do not eat or drink anything. This includes gum or candy.  Take only the medications instructed by your doctor with a sip of water.  If advised by your prescribing doctor or surgeon, shower using Hibiclens (instructions above).  Brush your teeth before coming to the hospital.  Please check-in on time.      BRING ON THE DAY OF PROCEDURE     Identification and medical insurance cards  Pacemaker/AICD card, if applicable  Sleep apnea machine and mask, if applicable  List of medications. Include name, dose, and how often you take it  Any emergency medication you usually carry (i.e. Epi pen, inhaler, nitroglycerin)  Any durable medical equipment (DME) you use, such as walker, crutches, or cryotherapy device  Legal Guardian or Power of , if applicable, must be physically present or immediately available by phone.      LEAVE ALL VALUABLES AT HOME     If you have any questions regarding your procedure, please call us or Sanket Willis MD's office.     Sincerely,  Surgical team at [facility]     Please visit [facility]'s [employer] Login Page or Home Page for additional information about your procedure.           [facility]   [id number]  [facility] [id number]

## 2025-05-22 NOTE — PREPROCEDURE INSTRUCTIONS
Medication List            Accurate as of May 22, 2025  3:34 PM. Always use your most recent med list.                acetaminophen 500 mg tablet  Commonly known as: Tylenol  Medication Adjustments for Surgery: Take/Use as prescribed     ascorbic acid (vitamin C) 1,000 mg ER tablet  Additional Medication Adjustments for Surgery: Take last dose 7 days before surgery     aspirin-acetaminophen-caffeine 250-250-65 mg tablet  Commonly known as: Excedrin Migraine  Additional Medication Adjustments for Surgery: Take last dose 7 days before surgery     cyanocobalamin 100 mcg tablet  Commonly known as: Vitamin B-12  Additional Medication Adjustments for Surgery: Take last dose 7 days before surgery     ferrous sulfate 325 mg (65 mg elemental) tablet  Medication Adjustments for Surgery: Do Not take on the morning of surgery     multivitamin with minerals tablet  Additional Medication Adjustments for Surgery: Take last dose 7 days before surgery     tamsulosin 0.4 mg 24 hr capsule  Commonly known as: Flomax  Take 2 capsules (0.8 mg) by mouth once daily.  Medication Adjustments for Surgery: Take on the morning of surgery     TURMERIC ORAL  Additional Medication Adjustments for Surgery: Take last dose 7 days before surgery     VITAMIN E ORAL  Additional Medication Adjustments for Surgery: Take last dose 7 days before surgery                              NPO Instructions:        Preoperative Fasting Guidelines    Why must I stop eating and drinking near surgery time?  With sedation, food or liquid in your stomach can enter your lungs causing serious complications  Increases nausea and vomiting    When do I need to stop eating and drinking before my surgery?  Do not eat any food after midnight the night before your surgery/procedure.  You may have up to 13.5 ounces of clear liquid until TWO hours before your instructed arrival time to the hospital.  This includes water, black tea/coffee, (no milk or cream) apple juice, and  electrolyte drinks (Gatorade)  You may chew gum until TWO hours before your surgery/procedure    Additional Instructions:     Day of Surgery:  Review your medication instructions, take indicated medications  Wear  comfortable loose fitting clothing  Do not use moisturizers, creams, lotions or perfume  All jewelry and valuables should be left at home    PAT DISCHARGE INSTRUCTIONS    Please call the Same Day Surgery (SDS) Department of the hospital where your procedure will be performed after 2:00 PM the day before your surgery. If you are scheduled on a Monday, or a Tuesday following a Monday holiday, you will need to call on the last business day prior to your surgery.      [facility]  [address] [place] [address], [id number] [phone]  Second Floor    Please let your surgeon know if:      You develop any open sores, shingles, burning or painful urination as these may increase your risk of an infection.   You no longer wish to have the surgery.   Any other personal circumstances change that may lead to the need to cancel or defer this surgery-such as being sick or getting admitted to any hospital within one week of your planned procedure.    Your contact details change, such as a change of address or phone number.    Starting now:     Please DO NOT drink alcohol or smoke for 24 hours before surgery. It is well known that quitting smoking can make a huge difference to your health and recovery from surgery. The longer you abstain from smoking, the better your chances of a healthy recovery. If you need help with quitting, call 9-913-QUIT-NOW to be connected to a trained counselor who will discuss the best methods to help you quit.     Before your surgery:    Please stop all supplements INCLUDING MARIJUANA 7 days prior to surgery. Or as directed by your surgeon.   Please stop taking NSAID pain medicine such as Advil and Motrin 7 days before surgery.    If you develop any fever,  cough, cold, rashes, cuts, scratches, scrapes, urinary symptoms or infection anywhere on your body (including teeth and gums) prior to surgery, please call your surgeon’s office as soon as possible. This may require treatment to reduce the chance of cancellation on the day of surgery.    The day before your surgery:   DIET- Please follow the diet instructions at the top of your packet.   Get a good night’s rest.  Use the special soap for bathing if you have been instructed to use one.    Scheduled surgery times may change and you will be notified if this occurs - please check your personal voicemail for any updates.     On the morning of surgery:   Wear comfortable, loose fitting clothes which open in the front. Please do not wear moisturizers, creams, lotions, makeup or perfume.    Please bring with you to surgery:   Photo ID and insurance card   Current list of medicines and allergies   Pacemaker/ Defibrillator/Heart stent cards   CPAP machine and mask    Slings/ splints/ crutches   A copy of your complete advanced directive/DHPOA.    Please do NOT bring with you to surgery:   All jewelry and valuables should be left at home.   Prosthetic devices such as contact lenses, hearing aids, dentures, eyelash extensions, hairpins and body piercings must be removed prior to going in to the surgical suite.    After outpatient surgery:   A responsible adult MUST accompany you at the time of discharge and stay with you for 24 hours after your surgery. You may NOT drive yourself home after surgery.    Do not drive, operate machinery, make critical decisions or do activities that require co-ordination or balance until after a night’s sleep.   Do not drink alcoholic beverages for 24 hours.   Instructions for resuming your medications will be provided by your surgeon.    CALL YOUR DOCTOR AFTER SURGERY IF YOU HAVE:     Chills and/or a fever of 101° F or higher.    Redness, swelling, pus or drainage from your surgical wound or a  bad smell from the wound.    Lightheadedness, fainting or confusion.    Persistent vomiting (throwing up) and are not able to eat or drink for 12 hours.    Three or more loose, watery bowel movements in 24 hours (diarrhea).   Difficulty or pain while urinating( after non-urological surgery)    Pain and swelling in your legs, especially if it is only on one side.    Difficulty breathing or are breathing faster than normal.    Any new concerning symptoms.        Dear Paul,     Thank you for choosing [facility] or for your procedure. Being prepared for your procedure can help reduce anxiety.     In order to avoid delay or cancellation of your procedure, please take note of the following instructions to ensure you have a smooth experience.     If there have been any changes to your insurance, address, or phone number please call us immediately.  Continue to take your medications as prescribed by your primary care provider. Certain medications may need to be stopped before your procedure/surgery. Your prescribing doctor or surgeon will let you know. If you have any questions or concerns about the current medications you are taking, please talk to your primary care provider.  Let your surgeon know if there is a change in your health or you have a cold/flu, fever, sore throat, or cough.  Stop smoking, using street drugs, or consuming excessive alcohol.  Coordinate to have a responsible adult accompany you home after your procedure.     TWO DAYS BEFORE YOUR PROCEDURE          DO NOT SHAVE OR USE HAIR REMOVING PRODUCTS     ONE DAY BEFORE YOUR PROCEDURE     Complete your prep as instructed by Sanket Willis MD.  Do not eat or drink after midnight before your procedure, with the exception of water. You may drink water up to 2 hours before the procedure unless otherwise instructed.  You will receive a call to indicate arrival time prior to your procedure scheduled time.  Do not bring valuables to your procedure.  Do not wear  jewelry to your procedure.  If advised by your prescribing doctor or surgeon, shower using Hibiclens (instructions below).               FOLLOW DR. WILLIS INSTRUCTIONS PROVIDED BELOW           Hibiclens instructions for the night before procedure/surgery AND the morning of procedure/surgery     Wash your hair with regular shampoo and rinse well.  Rinse your body with water.  Shower from the neck down with HIBICLENS soap.  Do NOT use Hibiclens on face, head/hair, or genitals.  For sensitive skin, test on your inner wrist first.  Discontinue use if you get a rash or are allergic to Chlorhexidine.  Rinse well.  Dry with a clean towel.     **After showering, do NOT apply hair products, lotions, powders, creams, makeup, nail polish, Vaseline, or deodorant.**     DAY OF PROCEDURE     Do not eat or drink anything. This includes gum or candy.  Take only the medications instructed by your doctor with a sip of water.  If advised by your prescribing doctor or surgeon, shower using Hibiclens (instructions above).  Brush your teeth before coming to the hospital.  Please check-in on time.      BRING ON THE DAY OF PROCEDURE     Identification and medical insurance cards  Pacemaker/AICD card, if applicable  Sleep apnea machine and mask, if applicable  List of medications. Include name, dose, and how often you take it  Any emergency medication you usually carry (i.e. Epi pen, inhaler, nitroglycerin)  Any durable medical equipment (DME) you use, such as walker, crutches, or cryotherapy device  Legal Guardian or Power of , if applicable, must be physically present or immediately available by phone.      LEAVE ALL VALUABLES AT HOME     If you have any questions regarding your procedure, please call us or Sanket Willis MD's office.     Sincerely,  Surgical team at [facility]     Please visit [facility]'s [employer] Login Page or Home Page for additional information about your procedure.           [facility]   [id number]  [facility] [id number]

## 2025-05-27 ENCOUNTER — HOSPITAL ENCOUNTER (OUTPATIENT)
Dept: RADIOLOGY | Facility: HOSPITAL | Age: 70
Discharge: HOME | End: 2025-05-27
Payer: COMMERCIAL

## 2025-05-27 ENCOUNTER — HOSPITAL ENCOUNTER (INPATIENT)
Dept: CARDIOLOGY | Facility: HOSPITAL | Age: 70
LOS: 2 days | Discharge: HOME | End: 2025-05-29
Attending: SURGERY | Admitting: SURGERY
Payer: COMMERCIAL

## 2025-05-27 DIAGNOSIS — N20.0 RENAL CALCULI: Primary | ICD-10-CM

## 2025-05-27 DIAGNOSIS — N20.0 RENAL CALCULI: ICD-10-CM

## 2025-05-27 DIAGNOSIS — I15.1 HYPERTENSION SECONDARY TO OTHER RENAL DISORDERS: ICD-10-CM

## 2025-05-27 LAB
APPEARANCE UR: ABNORMAL
BACTERIA #/AREA URNS AUTO: ABNORMAL /HPF
BILIRUB UR STRIP.AUTO-MCNC: NEGATIVE MG/DL
COLOR UR: ABNORMAL
GLUCOSE UR STRIP.AUTO-MCNC: NORMAL MG/DL
KETONES UR STRIP.AUTO-MCNC: NEGATIVE MG/DL
LEUKOCYTE ESTERASE UR QL STRIP.AUTO: ABNORMAL
NITRITE UR QL STRIP.AUTO: NEGATIVE
PH UR STRIP.AUTO: 7.5 [PH]
POCT INTERNATIONAL NORMALIZATION RATIO: 1.1
POCT PROTHROMBIN TIME: 13.4 SECONDS
PROT UR STRIP.AUTO-MCNC: ABNORMAL MG/DL
RBC # UR STRIP.AUTO: ABNORMAL MG/DL
RBC #/AREA URNS AUTO: ABNORMAL /HPF
SP GR UR STRIP.AUTO: 1.01
UROBILINOGEN UR STRIP.AUTO-MCNC: NORMAL MG/DL
WBC #/AREA URNS AUTO: >50 /HPF

## 2025-05-27 PROCEDURE — 50433 PLMT NEPHROURETERAL CATHETER: CPT | Performed by: RADIOLOGY

## 2025-05-27 PROCEDURE — 99153 MOD SED SAME PHYS/QHP EA: CPT

## 2025-05-27 PROCEDURE — 50433 PLMT NEPHROURETERAL CATHETER: CPT | Mod: RT

## 2025-05-27 PROCEDURE — 1100000001 HC PRIVATE ROOM DAILY

## 2025-05-27 PROCEDURE — C1887 CATHETER, GUIDING: HCPCS

## 2025-05-27 PROCEDURE — 2500000004 HC RX 250 GENERAL PHARMACY W/ HCPCS (ALT 636 FOR OP/ED): Mod: JZ | Performed by: SURGERY

## 2025-05-27 PROCEDURE — 76942 ECHO GUIDE FOR BIOPSY: CPT

## 2025-05-27 PROCEDURE — 2550000001 HC RX 255 CONTRASTS: Performed by: RADIOLOGY

## 2025-05-27 PROCEDURE — C1894 INTRO/SHEATH, NON-LASER: HCPCS

## 2025-05-27 PROCEDURE — 99152 MOD SED SAME PHYS/QHP 5/>YRS: CPT

## 2025-05-27 PROCEDURE — 99152 MOD SED SAME PHYS/QHP 5/>YRS: CPT | Performed by: RADIOLOGY

## 2025-05-27 PROCEDURE — 7100000009 HC PHASE TWO TIME - INITIAL BASE CHARGE

## 2025-05-27 PROCEDURE — 81003 URINALYSIS AUTO W/O SCOPE: CPT | Performed by: NURSE PRACTITIONER

## 2025-05-27 PROCEDURE — 2500000001 HC RX 250 WO HCPCS SELF ADMINISTERED DRUGS (ALT 637 FOR MEDICARE OP): Performed by: RADIOLOGY

## 2025-05-27 PROCEDURE — 2500000004 HC RX 250 GENERAL PHARMACY W/ HCPCS (ALT 636 FOR OP/ED): Mod: JZ | Performed by: RADIOLOGY

## 2025-05-27 PROCEDURE — 2500000004 HC RX 250 GENERAL PHARMACY W/ HCPCS (ALT 636 FOR OP/ED): Mod: JZ | Performed by: NURSE PRACTITIONER

## 2025-05-27 PROCEDURE — 2720000007 HC OR 272 NO HCPCS

## 2025-05-27 PROCEDURE — 87086 URINE CULTURE/COLONY COUNT: CPT | Mod: WESLAB | Performed by: NURSE PRACTITIONER

## 2025-05-27 PROCEDURE — 2500000005 HC RX 250 GENERAL PHARMACY W/O HCPCS: Performed by: RADIOLOGY

## 2025-05-27 PROCEDURE — C1769 GUIDE WIRE: HCPCS

## 2025-05-27 PROCEDURE — 7100000010 HC PHASE TWO TIME - EACH INCREMENTAL 1 MINUTE

## 2025-05-27 PROCEDURE — 2500000001 HC RX 250 WO HCPCS SELF ADMINISTERED DRUGS (ALT 637 FOR MEDICARE OP): Performed by: NURSE PRACTITIONER

## 2025-05-27 RX ORDER — DOCUSATE SODIUM 100 MG/1
100 CAPSULE, LIQUID FILLED ORAL 2 TIMES DAILY
Status: DISCONTINUED | OUTPATIENT
Start: 2025-05-27 | End: 2025-05-29 | Stop reason: HOSPADM

## 2025-05-27 RX ORDER — OXYCODONE AND ACETAMINOPHEN 5; 325 MG/1; MG/1
1 TABLET ORAL ONCE
Refills: 0 | Status: COMPLETED | OUTPATIENT
Start: 2025-05-27 | End: 2025-05-27

## 2025-05-27 RX ORDER — NALOXONE HYDROCHLORIDE 0.4 MG/ML
0.2 INJECTION, SOLUTION INTRAMUSCULAR; INTRAVENOUS; SUBCUTANEOUS EVERY 5 MIN PRN
Status: DISCONTINUED | OUTPATIENT
Start: 2025-05-27 | End: 2025-05-29 | Stop reason: HOSPADM

## 2025-05-27 RX ORDER — SODIUM CHLORIDE, SODIUM LACTATE, POTASSIUM CHLORIDE, CALCIUM CHLORIDE 600; 310; 30; 20 MG/100ML; MG/100ML; MG/100ML; MG/100ML
100 INJECTION, SOLUTION INTRAVENOUS CONTINUOUS
Status: DISCONTINUED | OUTPATIENT
Start: 2025-05-27 | End: 2025-05-28

## 2025-05-27 RX ORDER — LIDOCAINE HYDROCHLORIDE 10 MG/ML
INJECTION, SOLUTION EPIDURAL; INFILTRATION; INTRACAUDAL; PERINEURAL AS NEEDED
Status: DISCONTINUED | OUTPATIENT
Start: 2025-05-27 | End: 2025-05-27 | Stop reason: HOSPADM

## 2025-05-27 RX ORDER — ACETAMINOPHEN 325 MG/1
650 TABLET ORAL EVERY 4 HOURS PRN
Status: DISCONTINUED | OUTPATIENT
Start: 2025-05-27 | End: 2025-05-29 | Stop reason: HOSPADM

## 2025-05-27 RX ORDER — OXYCODONE HYDROCHLORIDE 5 MG/1
5 TABLET ORAL EVERY 4 HOURS PRN
Refills: 0 | Status: DISCONTINUED | OUTPATIENT
Start: 2025-05-27 | End: 2025-05-29 | Stop reason: HOSPADM

## 2025-05-27 RX ORDER — HEPARIN SODIUM 5000 [USP'U]/ML
5000 INJECTION, SOLUTION INTRAVENOUS; SUBCUTANEOUS EVERY 8 HOURS
Status: DISCONTINUED | OUTPATIENT
Start: 2025-05-27 | End: 2025-05-29 | Stop reason: HOSPADM

## 2025-05-27 RX ORDER — ONDANSETRON 4 MG/1
4 TABLET, FILM COATED ORAL EVERY 8 HOURS PRN
Status: DISCONTINUED | OUTPATIENT
Start: 2025-05-27 | End: 2025-05-29 | Stop reason: HOSPADM

## 2025-05-27 RX ORDER — FENTANYL CITRATE 50 UG/ML
INJECTION, SOLUTION INTRAMUSCULAR; INTRAVENOUS AS NEEDED
Status: DISCONTINUED | OUTPATIENT
Start: 2025-05-27 | End: 2025-05-27 | Stop reason: HOSPADM

## 2025-05-27 RX ORDER — ONDANSETRON HYDROCHLORIDE 2 MG/ML
4 INJECTION, SOLUTION INTRAVENOUS EVERY 8 HOURS PRN
Status: DISCONTINUED | OUTPATIENT
Start: 2025-05-27 | End: 2025-05-29 | Stop reason: HOSPADM

## 2025-05-27 RX ORDER — MORPHINE SULFATE 2 MG/ML
2 INJECTION, SOLUTION INTRAMUSCULAR; INTRAVENOUS EVERY 4 HOURS PRN
Status: DISCONTINUED | OUTPATIENT
Start: 2025-05-27 | End: 2025-05-29 | Stop reason: HOSPADM

## 2025-05-27 RX ORDER — CIPROFLOXACIN 2 MG/ML
400 INJECTION, SOLUTION INTRAVENOUS ONCE
Status: COMPLETED | OUTPATIENT
Start: 2025-05-27 | End: 2025-05-27

## 2025-05-27 RX ORDER — IODIXANOL 320 MG/ML
INJECTION, SOLUTION INTRAVASCULAR AS NEEDED
Status: DISCONTINUED | OUTPATIENT
Start: 2025-05-27 | End: 2025-05-27 | Stop reason: HOSPADM

## 2025-05-27 RX ORDER — ACETAMINOPHEN 325 MG/1
650 TABLET ORAL ONCE
Status: COMPLETED | OUTPATIENT
Start: 2025-05-27 | End: 2025-05-27

## 2025-05-27 RX ORDER — MIDAZOLAM HYDROCHLORIDE 1 MG/ML
INJECTION, SOLUTION INTRAMUSCULAR; INTRAVENOUS AS NEEDED
Status: DISCONTINUED | OUTPATIENT
Start: 2025-05-27 | End: 2025-05-27 | Stop reason: HOSPADM

## 2025-05-27 RX ADMIN — FENTANYL CITRATE 50 MCG: 50 INJECTION, SOLUTION INTRAMUSCULAR; INTRAVENOUS at 14:36

## 2025-05-27 RX ADMIN — SODIUM CHLORIDE, SODIUM LACTATE, POTASSIUM CHLORIDE, AND CALCIUM CHLORIDE 100 ML/HR: 600; 310; 30; 20 INJECTION, SOLUTION INTRAVENOUS at 20:49

## 2025-05-27 RX ADMIN — LIDOCAINE HYDROCHLORIDE 10 ML: 10 INJECTION, SOLUTION EPIDURAL; INFILTRATION; INTRACAUDAL; PERINEURAL at 13:59

## 2025-05-27 RX ADMIN — FENTANYL CITRATE 50 MCG: 50 INJECTION, SOLUTION INTRAMUSCULAR; INTRAVENOUS at 13:55

## 2025-05-27 RX ADMIN — Medication 3 L/MIN: at 13:52

## 2025-05-27 RX ADMIN — ACETAMINOPHEN 650 MG: 325 TABLET ORAL at 15:15

## 2025-05-27 RX ADMIN — MORPHINE SULFATE 2 MG: 2 INJECTION, SOLUTION INTRAMUSCULAR; INTRAVENOUS at 19:42

## 2025-05-27 RX ADMIN — MIDAZOLAM 1 MG: 1 INJECTION INTRAMUSCULAR; INTRAVENOUS at 14:38

## 2025-05-27 RX ADMIN — HEPARIN SODIUM 5000 UNITS: 5000 INJECTION, SOLUTION INTRAVENOUS; SUBCUTANEOUS at 20:50

## 2025-05-27 RX ADMIN — CIPROFLOXACIN 400 MG: 400 INJECTION, SOLUTION INTRAVENOUS at 13:15

## 2025-05-27 RX ADMIN — MIDAZOLAM 1 MG: 1 INJECTION INTRAMUSCULAR; INTRAVENOUS at 14:00

## 2025-05-27 RX ADMIN — MIDAZOLAM 1 MG: 1 INJECTION INTRAMUSCULAR; INTRAVENOUS at 14:21

## 2025-05-27 RX ADMIN — MIDAZOLAM 1 MG: 1 INJECTION INTRAMUSCULAR; INTRAVENOUS at 13:55

## 2025-05-27 RX ADMIN — OXYCODONE HYDROCHLORIDE AND ACETAMINOPHEN 1 TABLET: 5; 325 TABLET ORAL at 16:24

## 2025-05-27 RX ADMIN — IODIXANOL 30 ML: 320 INJECTION, SOLUTION INTRAVASCULAR at 14:57

## 2025-05-27 SDOH — SOCIAL STABILITY: SOCIAL INSECURITY: HAVE YOU HAD ANY THOUGHTS OF HARMING ANYONE ELSE?: NO

## 2025-05-27 SDOH — HEALTH STABILITY: MENTAL HEALTH
DO YOU FEEL STRESS - TENSE, RESTLESS, NERVOUS, OR ANXIOUS, OR UNABLE TO SLEEP AT NIGHT BECAUSE YOUR MIND IS TROUBLED ALL THE TIME - THESE DAYS?: ONLY A LITTLE

## 2025-05-27 SDOH — ECONOMIC STABILITY: FOOD INSECURITY: WITHIN THE PAST 12 MONTHS, YOU WORRIED THAT YOUR FOOD WOULD RUN OUT BEFORE YOU GOT THE MONEY TO BUY MORE.: NEVER TRUE

## 2025-05-27 SDOH — SOCIAL STABILITY: SOCIAL INSECURITY: WITHIN THE LAST YEAR, HAVE YOU BEEN AFRAID OF YOUR PARTNER OR EX-PARTNER?: NO

## 2025-05-27 SDOH — SOCIAL STABILITY: SOCIAL INSECURITY
WITHIN THE LAST YEAR, HAVE YOU BEEN RAPED OR FORCED TO HAVE ANY KIND OF SEXUAL ACTIVITY BY YOUR PARTNER OR EX-PARTNER?: NO

## 2025-05-27 SDOH — HEALTH STABILITY: PHYSICAL HEALTH
HOW OFTEN DO YOU NEED TO HAVE SOMEONE HELP YOU WHEN YOU READ INSTRUCTIONS, PAMPHLETS, OR OTHER WRITTEN MATERIAL FROM YOUR DOCTOR OR PHARMACY?: RARELY

## 2025-05-27 SDOH — SOCIAL STABILITY: SOCIAL NETWORK: IN A TYPICAL WEEK, HOW MANY TIMES DO YOU TALK ON THE PHONE WITH FAMILY, FRIENDS, OR NEIGHBORS?: THREE TIMES A WEEK

## 2025-05-27 SDOH — ECONOMIC STABILITY: HOUSING INSECURITY: IN THE LAST 12 MONTHS, WAS THERE A TIME WHEN YOU WERE NOT ABLE TO PAY THE MORTGAGE OR RENT ON TIME?: NO

## 2025-05-27 SDOH — HEALTH STABILITY: PHYSICAL HEALTH: ON AVERAGE, HOW MANY DAYS PER WEEK DO YOU ENGAGE IN MODERATE TO STRENUOUS EXERCISE (LIKE A BRISK WALK)?: 7 DAYS

## 2025-05-27 SDOH — SOCIAL STABILITY: SOCIAL INSECURITY: WITHIN THE LAST YEAR, HAVE YOU BEEN HUMILIATED OR EMOTIONALLY ABUSED IN OTHER WAYS BY YOUR PARTNER OR EX-PARTNER?: NO

## 2025-05-27 SDOH — ECONOMIC STABILITY: FOOD INSECURITY: WITHIN THE PAST 12 MONTHS, THE FOOD YOU BOUGHT JUST DIDN'T LAST AND YOU DIDN'T HAVE MONEY TO GET MORE.: NEVER TRUE

## 2025-05-27 SDOH — SOCIAL STABILITY: SOCIAL INSECURITY
WITHIN THE LAST YEAR, HAVE YOU BEEN KICKED, HIT, SLAPPED, OR OTHERWISE PHYSICALLY HURT BY YOUR PARTNER OR EX-PARTNER?: NO

## 2025-05-27 SDOH — ECONOMIC STABILITY: HOUSING INSECURITY: IN THE PAST 12 MONTHS, HOW MANY TIMES HAVE YOU MOVED WHERE YOU WERE LIVING?: 0

## 2025-05-27 SDOH — HEALTH STABILITY: MENTAL HEALTH: HOW OFTEN DO YOU HAVE SIX OR MORE DRINKS ON ONE OCCASION?: NEVER

## 2025-05-27 SDOH — SOCIAL STABILITY: SOCIAL NETWORK
DO YOU BELONG TO ANY CLUBS OR ORGANIZATIONS SUCH AS CHURCH GROUPS, UNIONS, FRATERNAL OR ATHLETIC GROUPS, OR SCHOOL GROUPS?: PATIENT DECLINED

## 2025-05-27 SDOH — HEALTH STABILITY: MENTAL HEALTH: HOW MANY DRINKS CONTAINING ALCOHOL DO YOU HAVE ON A TYPICAL DAY WHEN YOU ARE DRINKING?: PATIENT DOES NOT DRINK

## 2025-05-27 SDOH — ECONOMIC STABILITY: HOUSING INSECURITY: AT ANY TIME IN THE PAST 12 MONTHS, WERE YOU HOMELESS OR LIVING IN A SHELTER (INCLUDING NOW)?: NO

## 2025-05-27 SDOH — SOCIAL STABILITY: SOCIAL INSECURITY: WERE YOU ABLE TO COMPLETE ALL THE BEHAVIORAL HEALTH SCREENINGS?: YES

## 2025-05-27 SDOH — SOCIAL STABILITY: SOCIAL NETWORK: HOW OFTEN DO YOU ATTEND CHURCH OR RELIGIOUS SERVICES?: PATIENT DECLINED

## 2025-05-27 SDOH — HEALTH STABILITY: MENTAL HEALTH: HOW OFTEN DO YOU HAVE A DRINK CONTAINING ALCOHOL?: NEVER

## 2025-05-27 SDOH — SOCIAL STABILITY: SOCIAL INSECURITY: ARE THERE ANY APPARENT SIGNS OF INJURIES/BEHAVIORS THAT COULD BE RELATED TO ABUSE/NEGLECT?: NO

## 2025-05-27 SDOH — SOCIAL STABILITY: SOCIAL INSECURITY: ARE YOU MARRIED, WIDOWED, DIVORCED, SEPARATED, NEVER MARRIED, OR LIVING WITH A PARTNER?: LIVING WITH PARTNER

## 2025-05-27 SDOH — HEALTH STABILITY: PHYSICAL HEALTH: ON AVERAGE, HOW MANY MINUTES DO YOU ENGAGE IN EXERCISE AT THIS LEVEL?: 10 MIN

## 2025-05-27 SDOH — SOCIAL STABILITY: SOCIAL INSECURITY: ABUSE: ADULT

## 2025-05-27 SDOH — ECONOMIC STABILITY: INCOME INSECURITY: IN THE PAST 12 MONTHS HAS THE ELECTRIC, GAS, OIL, OR WATER COMPANY THREATENED TO SHUT OFF SERVICES IN YOUR HOME?: NO

## 2025-05-27 SDOH — SOCIAL STABILITY: SOCIAL NETWORK: HOW OFTEN DO YOU GET TOGETHER WITH FRIENDS OR RELATIVES?: THREE TIMES A WEEK

## 2025-05-27 SDOH — SOCIAL STABILITY: SOCIAL INSECURITY: DO YOU FEEL UNSAFE GOING BACK TO THE PLACE WHERE YOU ARE LIVING?: NO

## 2025-05-27 SDOH — SOCIAL STABILITY: SOCIAL INSECURITY: ARE YOU OR HAVE YOU BEEN THREATENED OR ABUSED PHYSICALLY, EMOTIONALLY, OR SEXUALLY BY ANYONE?: NO

## 2025-05-27 SDOH — ECONOMIC STABILITY: TRANSPORTATION INSECURITY: IN THE PAST 12 MONTHS, HAS LACK OF TRANSPORTATION KEPT YOU FROM MEDICAL APPOINTMENTS OR FROM GETTING MEDICATIONS?: NO

## 2025-05-27 SDOH — ECONOMIC STABILITY: HOUSING INSECURITY: DO YOU FEEL UNSAFE GOING BACK TO THE PLACE WHERE YOU LIVE?: NO

## 2025-05-27 SDOH — SOCIAL STABILITY: SOCIAL INSECURITY
ASK PARENT OR GUARDIAN: ARE THERE TIMES WHEN YOU, YOUR CHILD(REN), OR ANY MEMBER OF YOUR HOUSEHOLD FEEL UNSAFE, HARMED, OR THREATENED AROUND PERSONS WITH WHOM YOU KNOW OR LIVE?: NO

## 2025-05-27 SDOH — SOCIAL STABILITY: SOCIAL INSECURITY: HAVE YOU HAD THOUGHTS OF HARMING ANYONE ELSE?: NO

## 2025-05-27 SDOH — SOCIAL STABILITY: SOCIAL NETWORK: HOW OFTEN DO YOU ATTEND MEETINGS OF THE CLUBS OR ORGANIZATIONS YOU BELONG TO?: PATIENT DECLINED

## 2025-05-27 SDOH — ECONOMIC STABILITY: FOOD INSECURITY: HOW HARD IS IT FOR YOU TO PAY FOR THE VERY BASICS LIKE FOOD, HOUSING, MEDICAL CARE, AND HEATING?: NOT HARD AT ALL

## 2025-05-27 SDOH — SOCIAL STABILITY: SOCIAL INSECURITY: DOES ANYONE TRY TO KEEP YOU FROM HAVING/CONTACTING OTHER FRIENDS OR DOING THINGS OUTSIDE YOUR HOME?: NO

## 2025-05-27 SDOH — HEALTH STABILITY: PHYSICAL HEALTH
HOW OFTEN DO YOU NEED TO HAVE SOMEONE HELP YOU WHEN YOU READ INSTRUCTIONS, PAMPHLETS, OR OTHER WRITTEN MATERIAL FROM YOUR DOCTOR OR PHARMACY?: NEVER

## 2025-05-27 SDOH — SOCIAL STABILITY: SOCIAL INSECURITY: HAS ANYONE EVER THREATENED TO HURT YOUR FAMILY OR YOUR PETS?: NO

## 2025-05-27 SDOH — SOCIAL STABILITY: SOCIAL INSECURITY: DO YOU FEEL ANYONE HAS EXPLOITED OR TAKEN ADVANTAGE OF YOU FINANCIALLY OR OF YOUR PERSONAL PROPERTY?: NO

## 2025-05-27 ASSESSMENT — COGNITIVE AND FUNCTIONAL STATUS - GENERAL
WALKING IN HOSPITAL ROOM: A LITTLE
MOBILITY SCORE: 21
DAILY ACTIVITIY SCORE: 23
CLIMB 3 TO 5 STEPS WITH RAILING: A LITTLE
PATIENT BASELINE BEDBOUND: NO
DAILY ACTIVITIY SCORE: 24
STANDING UP FROM CHAIR USING ARMS: A LITTLE
MOBILITY SCORE: 24
TOILETING: A LITTLE

## 2025-05-27 ASSESSMENT — ACTIVITIES OF DAILY LIVING (ADL)
JUDGMENT_ADEQUATE_SAFELY_COMPLETE_DAILY_ACTIVITIES: YES
GROOMING: INDEPENDENT
LACK_OF_TRANSPORTATION: NO
DRESSING YOURSELF: INDEPENDENT
HEARING - RIGHT EAR: FUNCTIONAL
BATHING: INDEPENDENT
PATIENT'S MEMORY ADEQUATE TO SAFELY COMPLETE DAILY ACTIVITIES?: YES
LACK_OF_TRANSPORTATION: NO
ASSISTIVE_DEVICE: DENTURES LOWER;DENTURES UPPER
LACK_OF_TRANSPORTATION: NO
WALKS IN HOME: INDEPENDENT
FEEDING YOURSELF: INDEPENDENT
HEARING - LEFT EAR: FUNCTIONAL
TOILETING: INDEPENDENT
ADEQUATE_TO_COMPLETE_ADL: YES

## 2025-05-27 ASSESSMENT — COLUMBIA-SUICIDE SEVERITY RATING SCALE - C-SSRS
2. HAVE YOU ACTUALLY HAD ANY THOUGHTS OF KILLING YOURSELF?: NO
1. IN THE PAST MONTH, HAVE YOU WISHED YOU WERE DEAD OR WISHED YOU COULD GO TO SLEEP AND NOT WAKE UP?: NO
6. HAVE YOU EVER DONE ANYTHING, STARTED TO DO ANYTHING, OR PREPARED TO DO ANYTHING TO END YOUR LIFE?: NO

## 2025-05-27 ASSESSMENT — LIFESTYLE VARIABLES
AUDIT-C TOTAL SCORE: 0
AUDIT-C TOTAL SCORE: 0
HOW MANY STANDARD DRINKS CONTAINING ALCOHOL DO YOU HAVE ON A TYPICAL DAY: PATIENT DOES NOT DRINK
SKIP TO QUESTIONS 9-10: 1
HOW OFTEN DO YOU HAVE 6 OR MORE DRINKS ON ONE OCCASION: NEVER
AUDIT-C TOTAL SCORE: 0
PRESCIPTION_ABUSE_PAST_12_MONTHS: NO
SKIP TO QUESTIONS 9-10: 1
SUBSTANCE_ABUSE_PAST_12_MONTHS: NO
HOW OFTEN DO YOU HAVE A DRINK CONTAINING ALCOHOL: NEVER
AUDIT-C TOTAL SCORE: 0
SKIP TO QUESTIONS 9-10: 1

## 2025-05-27 ASSESSMENT — PAIN - FUNCTIONAL ASSESSMENT
PAIN_FUNCTIONAL_ASSESSMENT: 0-10

## 2025-05-27 ASSESSMENT — PAIN SCALES - GENERAL
PAINLEVEL_OUTOF10: 7
PAINLEVEL_OUTOF10: 3
PAINLEVEL_OUTOF10: 0 - NO PAIN
PAINLEVEL_OUTOF10: 7

## 2025-05-27 ASSESSMENT — PAIN DESCRIPTION - ORIENTATION: ORIENTATION: RIGHT

## 2025-05-27 ASSESSMENT — PAIN DESCRIPTION - DESCRIPTORS
DESCRIPTORS: ACHING;STABBING
DESCRIPTORS: ACHING

## 2025-05-27 ASSESSMENT — PATIENT HEALTH QUESTIONNAIRE - PHQ9
SUM OF ALL RESPONSES TO PHQ9 QUESTIONS 1 & 2: 0
2. FEELING DOWN, DEPRESSED OR HOPELESS: NOT AT ALL
1. LITTLE INTEREST OR PLEASURE IN DOING THINGS: NOT AT ALL

## 2025-05-27 NOTE — NURSING NOTE
Assumed care of pt. Pt brought up from surgery in bed. Dr. Willis secure messaged by jaleesa RN, stated he will get in orders for pt. No new orders at this time. Pt states they are in pain 8/10 at this time. Once orders placed, this RN will pull pain management. Care ongoing.

## 2025-05-27 NOTE — NURSING NOTE
Dr. Willis secure messaged a second time, no pain management orders in yet. Pt sitting up in bed with wife at bedside eating dinner. Care ongoing.

## 2025-05-27 NOTE — Clinical Note
Patient Clipped and Prepped: right flank. Prepped with ChloraPrep, a minimum of 3 minute dry time, longer if needed, no pooling noted, patient draped in sterile fashion.

## 2025-05-27 NOTE — CARE PLAN
The patient's goals for the shift include Eat and try to warm up    The clinical goals for the shift include Manage the pain.

## 2025-05-27 NOTE — DISCHARGE INSTRUCTIONS
Nephrostomy Tube Care- Patient and Family Education    What is a Nephrostomy Tube?  You have a Nephrostomy Tube. This tube has been inserted into your kidney to drain urine. You may feel pressure in  your back over your kidney area if the urine cannot drain freely.    Sedation:  If you received medication for sedation, it will be active in your body for approximately 24  hours. So you may feel a little sleepy. Therefore, for the next 24 hours:  ? DO NOT drive a car, operate machinery or power tools. It is recommended that a   responsible adult be with you for the first 24 hours.  ? DO NOT drink any alcoholic beverages or take any non-prescriptive medications that   contain alcohol.  ? DO NOT make any important decisions or sign any legal/important documents.    To Prevent Problems:  You may need help in caring for your Nephrostomy Tube. Follow the steps listed below to  prevent problems.  ? Coil the tube so it is never bent or kinked. Tape the tube securely to your skin.  ? Change the dressing around the tube 3 times a week, or more often if it is soiled.  ? Do Not put any ointment or cream around the area unless your doctor tells you to do so.  ? Inspect the skin around the tube for any drainage, redness, or pus.  ? Drink at least 6 to 8, 8-ounce glasses of water, juice, or other liquid each day unless your  doctor restricts your fluid intake.  ? When you shower, cover your dressing with plastic. Tape the plastic to your skin.  ? Do not sit in a tub, or whirlpool. Do not go swimming.  ? The tube should be changed every 8 to 12 weeks by a radiologist. This will help prevent  infection. This should be scheduled in advance.    Call your Doctor If:  ? The tube falls out or pulls out partway.  ? Your urine has a strong odor.  ? Your urine is cloudy.  ? There is blood in your urine or you notice a change in the amount of blood in your urine.  ? There is a sudden change in the amount of urine (check with your doctor about  how much  to expect before you leave the hospital).  ? You have pain or pressure over the kidney area.  ? You see pus and redness on the skin where the tube is inserted.  ? There is urine leaking around the tube.    You may have some of these symptoms when you leave the hospital. Call your doctor if there is  a change.    Dressing Change:  ? To prevent or reduce infection, follow the steps below.  ? Gather supplies:  o Small waste bag.  o Paper tape.  o 4 x 4 sterile dressing.  o Clean washcloth and towel.  ? Wash your hands well. Dry them with a clean towel.  ? Cut 5 strips of tape, each about 6 inches long.  ? Open the sterile dressing package(s) by folding back the top. DO NOT TOUCH THE  DRESSING. Lay the package(s) on the table.  ? Remove the old dressing. Place it in the waste bag.  ? Inspect the skin around the tube for any redness, drainage, or pus. Check the old dressing  for dampness or odor. These may be signs that urine is leaking around the tube.  ? Carefully wash your skin with soap and water. Rinse and dry it with a clean towel.  ? Place the dressing around the tube one at a time. Change the direction of the slit dressing  so the entire area is covered and no skin shows.  ? Tape the dressing  ? Make sure there are no kinks in the tube, then coil it and tape to your skin.     How to Reach your Doctor:    Call Dr. Willis at [phone] with problems or questions.     This info is a general resource. It is not meant to replace your health care provider’s advice.  Ask your doctor or health care team any questions. Always follow their instructions.      *If you have any questions, please contact Dr. Dewitt's office at [phone].     Monitor your blood pressure daily. Log the values. Follow-up with Cardiology as advised.

## 2025-05-28 ENCOUNTER — ANESTHESIA (OUTPATIENT)
Dept: OPERATING ROOM | Facility: HOSPITAL | Age: 70
End: 2025-05-28
Payer: COMMERCIAL

## 2025-05-28 ENCOUNTER — APPOINTMENT (OUTPATIENT)
Dept: RADIOLOGY | Facility: HOSPITAL | Age: 70
End: 2025-05-28
Payer: COMMERCIAL

## 2025-05-28 ENCOUNTER — APPOINTMENT (OUTPATIENT)
Dept: CARDIOLOGY | Facility: HOSPITAL | Age: 70
End: 2025-05-28
Payer: COMMERCIAL

## 2025-05-28 ENCOUNTER — ANESTHESIA EVENT (OUTPATIENT)
Dept: OPERATING ROOM | Facility: HOSPITAL | Age: 70
End: 2025-05-28
Payer: COMMERCIAL

## 2025-05-28 LAB
ANION GAP SERPL CALCULATED.3IONS-SCNC: 9 MMOL/L (ref 10–20)
ATRIAL RATE: 67 BPM
ATRIAL RATE: 68 BPM
BUN SERPL-MCNC: 13 MG/DL (ref 6–23)
CALCIUM SERPL-MCNC: 9.3 MG/DL (ref 8.6–10.3)
CHLORIDE SERPL-SCNC: 102 MMOL/L (ref 98–107)
CO2 SERPL-SCNC: 28 MMOL/L (ref 21–32)
CREAT SERPL-MCNC: 0.89 MG/DL (ref 0.5–1.3)
EGFRCR SERPLBLD CKD-EPI 2021: >90 ML/MIN/1.73M*2
ERYTHROCYTE [DISTWIDTH] IN BLOOD BY AUTOMATED COUNT: 16.7 % (ref 11.5–14.5)
GLUCOSE SERPL-MCNC: 105 MG/DL (ref 74–99)
HCT VFR BLD AUTO: 32.6 % (ref 41–52)
HGB BLD-MCNC: 10.4 G/DL (ref 13.5–17.5)
HOLD SPECIMEN: NORMAL
MCH RBC QN AUTO: 24 PG (ref 26–34)
MCHC RBC AUTO-ENTMCNC: 31.9 G/DL (ref 32–36)
MCV RBC AUTO: 75 FL (ref 80–100)
NRBC BLD-RTO: 0 /100 WBCS (ref 0–0)
P AXIS: 111 DEGREES
P AXIS: 72 DEGREES
P OFFSET: 171 MS
P OFFSET: 186 MS
P ONSET: 141 MS
P ONSET: 145 MS
PLATELET # BLD AUTO: 479 X10*3/UL (ref 150–450)
POTASSIUM SERPL-SCNC: 4.1 MMOL/L (ref 3.5–5.3)
PR INTERVAL: 152 MS
PR INTERVAL: 164 MS
Q ONSET: 221 MS
Q ONSET: 223 MS
QRS COUNT: 11 BEATS
QRS COUNT: 11 BEATS
QRS DURATION: 88 MS
QRS DURATION: 92 MS
QT INTERVAL: 404 MS
QT INTERVAL: 404 MS
QTC CALCULATION(BAZETT): 426 MS
QTC CALCULATION(BAZETT): 429 MS
QTC FREDERICIA: 419 MS
QTC FREDERICIA: 421 MS
R AXIS: 49 DEGREES
R AXIS: 50 DEGREES
RBC # BLD AUTO: 4.33 X10*6/UL (ref 4.5–5.9)
SODIUM SERPL-SCNC: 135 MMOL/L (ref 136–145)
T AXIS: 63 DEGREES
T AXIS: 74 DEGREES
T OFFSET: 423 MS
T OFFSET: 425 MS
VENTRICULAR RATE: 67 BPM
VENTRICULAR RATE: 68 BPM
WBC # BLD AUTO: 11.7 X10*3/UL (ref 4.4–11.3)

## 2025-05-28 PROCEDURE — A50081 PR PERCUT REMV KID STONE,2+ CM: Performed by: ANESTHESIOLOGY

## 2025-05-28 PROCEDURE — C1748 ENDOSCOPE, SINGLE, UGI: HCPCS | Performed by: SURGERY

## 2025-05-28 PROCEDURE — 93005 ELECTROCARDIOGRAM TRACING: CPT

## 2025-05-28 PROCEDURE — 99221 1ST HOSP IP/OBS SF/LOW 40: CPT | Performed by: NURSE PRACTITIONER

## 2025-05-28 PROCEDURE — 50433 PLMT NEPHROURETERAL CATHETER: CPT | Mod: BILATERAL PROCEDURE | Performed by: RADIOLOGY

## 2025-05-28 PROCEDURE — 7100000002 HC RECOVERY ROOM TIME - EACH INCREMENTAL 1 MINUTE: Performed by: SURGERY

## 2025-05-28 PROCEDURE — 2780000003 HC OR 278 NO HCPCS: Performed by: SURGERY

## 2025-05-28 PROCEDURE — 0TC38ZZ EXTIRPATION OF MATTER FROM RIGHT KIDNEY PELVIS, VIA NATURAL OR ARTIFICIAL OPENING ENDOSCOPIC: ICD-10-PCS | Performed by: SURGERY

## 2025-05-28 PROCEDURE — 99100 ANES PT EXTEME AGE<1 YR&>70: CPT | Performed by: ANESTHESIOLOGY

## 2025-05-28 PROCEDURE — 2500000001 HC RX 250 WO HCPCS SELF ADMINISTERED DRUGS (ALT 637 FOR MEDICARE OP): Performed by: SURGERY

## 2025-05-28 PROCEDURE — 2500000004 HC RX 250 GENERAL PHARMACY W/ HCPCS (ALT 636 FOR OP/ED): Mod: JZ | Performed by: SURGERY

## 2025-05-28 PROCEDURE — 2500000001 HC RX 250 WO HCPCS SELF ADMINISTERED DRUGS (ALT 637 FOR MEDICARE OP): Performed by: NURSE PRACTITIONER

## 2025-05-28 PROCEDURE — 76000 FLUOROSCOPY <1 HR PHYS/QHP: CPT

## 2025-05-28 PROCEDURE — 36415 COLL VENOUS BLD VENIPUNCTURE: CPT | Performed by: NURSE PRACTITIONER

## 2025-05-28 PROCEDURE — C2617 STENT, NON-COR, TEM W/O DEL: HCPCS | Performed by: SURGERY

## 2025-05-28 PROCEDURE — 82365 CALCULUS SPECTROSCOPY: CPT | Performed by: SURGERY

## 2025-05-28 PROCEDURE — 3700000002 HC GENERAL ANESTHESIA TIME - EACH INCREMENTAL 1 MINUTE: Performed by: SURGERY

## 2025-05-28 PROCEDURE — C1769 GUIDE WIRE: HCPCS | Performed by: SURGERY

## 2025-05-28 PROCEDURE — 93010 ELECTROCARDIOGRAM REPORT: CPT | Performed by: INTERNAL MEDICINE

## 2025-05-28 PROCEDURE — 85027 COMPLETE CBC AUTOMATED: CPT | Performed by: NURSE PRACTITIONER

## 2025-05-28 PROCEDURE — C1726 CATH, BAL DIL, NON-VASCULAR: HCPCS | Performed by: SURGERY

## 2025-05-28 PROCEDURE — 3600000004 HC OR TIME - INITIAL BASE CHARGE - PROCEDURE LEVEL FOUR: Performed by: SURGERY

## 2025-05-28 PROCEDURE — 50081 PERQ NL/PL LITHOTRP CPLX>2CM: CPT | Performed by: SURGERY

## 2025-05-28 PROCEDURE — 2500000004 HC RX 250 GENERAL PHARMACY W/ HCPCS (ALT 636 FOR OP/ED): Mod: JW | Performed by: NURSE ANESTHETIST, CERTIFIED REGISTERED

## 2025-05-28 PROCEDURE — 2500000004 HC RX 250 GENERAL PHARMACY W/ HCPCS (ALT 636 FOR OP/ED): Performed by: SURGERY

## 2025-05-28 PROCEDURE — 3600000009 HC OR TIME - EACH INCREMENTAL 1 MINUTE - PROCEDURE LEVEL FOUR: Performed by: SURGERY

## 2025-05-28 PROCEDURE — 7100000001 HC RECOVERY ROOM TIME - INITIAL BASE CHARGE: Performed by: SURGERY

## 2025-05-28 PROCEDURE — 2720000007 HC OR 272 NO HCPCS: Performed by: SURGERY

## 2025-05-28 PROCEDURE — 0T768DZ DILATION OF RIGHT URETER WITH INTRALUMINAL DEVICE, VIA NATURAL OR ARTIFICIAL OPENING ENDOSCOPIC: ICD-10-PCS | Performed by: SURGERY

## 2025-05-28 PROCEDURE — 3700000001 HC GENERAL ANESTHESIA TIME - INITIAL BASE CHARGE: Performed by: SURGERY

## 2025-05-28 PROCEDURE — 1100000001 HC PRIVATE ROOM DAILY

## 2025-05-28 PROCEDURE — A50081 PR PERCUT REMV KID STONE,2+ CM: Performed by: NURSE ANESTHETIST, CERTIFIED REGISTERED

## 2025-05-28 PROCEDURE — 2500000004 HC RX 250 GENERAL PHARMACY W/ HCPCS (ALT 636 FOR OP/ED): Mod: JZ | Performed by: ANESTHESIOLOGY

## 2025-05-28 PROCEDURE — 80048 BASIC METABOLIC PNL TOTAL CA: CPT | Performed by: NURSE PRACTITIONER

## 2025-05-28 DEVICE — INLAY OPTIMA URETERAL STENT W/O GUIDEWIRE
Type: IMPLANTABLE DEVICE | Site: URETER | Status: FUNCTIONAL
Brand: BARD® INLAY OPTIMA® URETERAL STENT

## 2025-05-28 RX ORDER — SODIUM CHLORIDE, SODIUM LACTATE, POTASSIUM CHLORIDE, CALCIUM CHLORIDE 600; 310; 30; 20 MG/100ML; MG/100ML; MG/100ML; MG/100ML
100 INJECTION, SOLUTION INTRAVENOUS CONTINUOUS
Status: ACTIVE | OUTPATIENT
Start: 2025-05-28 | End: 2025-05-28

## 2025-05-28 RX ORDER — AMLODIPINE BESYLATE 10 MG/1
10 TABLET ORAL DAILY
Status: DISCONTINUED | OUTPATIENT
Start: 2025-05-28 | End: 2025-05-29 | Stop reason: HOSPADM

## 2025-05-28 RX ORDER — AMLODIPINE BESYLATE 10 MG/1
10 TABLET ORAL DAILY
Qty: 90 TABLET | Refills: 3 | Status: SHIPPED | OUTPATIENT
Start: 2025-05-29

## 2025-05-28 RX ORDER — ONDANSETRON HYDROCHLORIDE 2 MG/ML
4 INJECTION, SOLUTION INTRAVENOUS ONCE AS NEEDED
Status: DISCONTINUED | OUTPATIENT
Start: 2025-05-28 | End: 2025-05-28 | Stop reason: HOSPADM

## 2025-05-28 RX ORDER — ROCURONIUM BROMIDE 10 MG/ML
INJECTION, SOLUTION INTRAVENOUS AS NEEDED
Status: DISCONTINUED | OUTPATIENT
Start: 2025-05-28 | End: 2025-05-28

## 2025-05-28 RX ORDER — FENTANYL CITRATE 50 UG/ML
50 INJECTION, SOLUTION INTRAMUSCULAR; INTRAVENOUS EVERY 5 MIN PRN
Status: DISCONTINUED | OUTPATIENT
Start: 2025-05-28 | End: 2025-05-28 | Stop reason: HOSPADM

## 2025-05-28 RX ORDER — LISINOPRIL 20 MG/1
20 TABLET ORAL DAILY
Status: DISCONTINUED | OUTPATIENT
Start: 2025-05-28 | End: 2025-05-28

## 2025-05-28 RX ORDER — LABETALOL HYDROCHLORIDE 5 MG/ML
5 INJECTION, SOLUTION INTRAVENOUS ONCE AS NEEDED
Status: DISCONTINUED | OUTPATIENT
Start: 2025-05-28 | End: 2025-05-28 | Stop reason: HOSPADM

## 2025-05-28 RX ORDER — LIDOCAINE HYDROCHLORIDE 10 MG/ML
0.1 INJECTION, SOLUTION INFILTRATION; PERINEURAL ONCE
Status: DISCONTINUED | OUTPATIENT
Start: 2025-05-28 | End: 2025-05-28 | Stop reason: HOSPADM

## 2025-05-28 RX ORDER — FENTANYL CITRATE 50 UG/ML
25 INJECTION, SOLUTION INTRAMUSCULAR; INTRAVENOUS EVERY 5 MIN PRN
Status: DISCONTINUED | OUTPATIENT
Start: 2025-05-28 | End: 2025-05-28 | Stop reason: HOSPADM

## 2025-05-28 RX ORDER — CLONIDINE HYDROCHLORIDE 0.2 MG/1
0.2 TABLET ORAL ONCE
Status: COMPLETED | OUTPATIENT
Start: 2025-05-28 | End: 2025-05-28

## 2025-05-28 RX ORDER — ONDANSETRON HYDROCHLORIDE 2 MG/ML
INJECTION, SOLUTION INTRAVENOUS AS NEEDED
Status: DISCONTINUED | OUTPATIENT
Start: 2025-05-28 | End: 2025-05-28

## 2025-05-28 RX ORDER — PHENYLEPHRINE HCL IN 0.9% NACL 1 MG/10 ML
SYRINGE (ML) INTRAVENOUS AS NEEDED
Status: DISCONTINUED | OUTPATIENT
Start: 2025-05-28 | End: 2025-05-28

## 2025-05-28 RX ORDER — LIDOCAINE HYDROCHLORIDE 20 MG/ML
INJECTION, SOLUTION INFILTRATION; PERINEURAL AS NEEDED
Status: DISCONTINUED | OUTPATIENT
Start: 2025-05-28 | End: 2025-05-28

## 2025-05-28 RX ORDER — OXYCODONE HYDROCHLORIDE 5 MG/1
5 TABLET ORAL EVERY 4 HOURS PRN
Status: DISCONTINUED | OUTPATIENT
Start: 2025-05-28 | End: 2025-05-28 | Stop reason: HOSPADM

## 2025-05-28 RX ORDER — PROPOFOL 10 MG/ML
INJECTION, EMULSION INTRAVENOUS AS NEEDED
Status: DISCONTINUED | OUTPATIENT
Start: 2025-05-28 | End: 2025-05-28

## 2025-05-28 RX ORDER — FENTANYL CITRATE 50 UG/ML
INJECTION, SOLUTION INTRAMUSCULAR; INTRAVENOUS AS NEEDED
Status: DISCONTINUED | OUTPATIENT
Start: 2025-05-28 | End: 2025-05-28

## 2025-05-28 RX ORDER — MIDAZOLAM HYDROCHLORIDE 1 MG/ML
INJECTION, SOLUTION INTRAMUSCULAR; INTRAVENOUS AS NEEDED
Status: DISCONTINUED | OUTPATIENT
Start: 2025-05-28 | End: 2025-05-28

## 2025-05-28 RX ORDER — LOSARTAN POTASSIUM 50 MG/1
50 TABLET ORAL DAILY
Status: DISCONTINUED | OUTPATIENT
Start: 2025-05-28 | End: 2025-05-28

## 2025-05-28 RX ORDER — MEPERIDINE HYDROCHLORIDE 25 MG/ML
12.5 INJECTION INTRAMUSCULAR; INTRAVENOUS; SUBCUTANEOUS EVERY 10 MIN PRN
Status: DISCONTINUED | OUTPATIENT
Start: 2025-05-28 | End: 2025-05-28 | Stop reason: HOSPADM

## 2025-05-28 RX ADMIN — MIDAZOLAM 1 MG: 1 INJECTION INTRAMUSCULAR; INTRAVENOUS at 11:18

## 2025-05-28 RX ADMIN — ROCURONIUM BROMIDE 10 MG: 10 INJECTION, SOLUTION INTRAVENOUS at 12:35

## 2025-05-28 RX ADMIN — Medication 100 MCG: at 12:55

## 2025-05-28 RX ADMIN — ROCURONIUM BROMIDE 10 MG: 10 INJECTION, SOLUTION INTRAVENOUS at 11:41

## 2025-05-28 RX ADMIN — CLONIDINE HYDROCHLORIDE 0.2 MG: 0.2 TABLET ORAL at 01:01

## 2025-05-28 RX ADMIN — MORPHINE SULFATE 2 MG: 2 INJECTION, SOLUTION INTRAMUSCULAR; INTRAVENOUS at 01:10

## 2025-05-28 RX ADMIN — ROCURONIUM BROMIDE 10 MG: 10 INJECTION, SOLUTION INTRAVENOUS at 12:55

## 2025-05-28 RX ADMIN — SUGAMMADEX 200 MG: 100 INJECTION, SOLUTION INTRAVENOUS at 13:07

## 2025-05-28 RX ADMIN — ROCURONIUM BROMIDE 50 MG: 10 INJECTION, SOLUTION INTRAVENOUS at 11:25

## 2025-05-28 RX ADMIN — SODIUM CHLORIDE, SODIUM LACTATE, POTASSIUM CHLORIDE, AND CALCIUM CHLORIDE 100 ML/HR: 600; 310; 30; 20 INJECTION, SOLUTION INTRAVENOUS at 15:26

## 2025-05-28 RX ADMIN — ONDANSETRON HYDROCHLORIDE 4 MG: 2 INJECTION INTRAMUSCULAR; INTRAVENOUS at 11:30

## 2025-05-28 RX ADMIN — HEPARIN SODIUM 5000 UNITS: 5000 INJECTION, SOLUTION INTRAVENOUS; SUBCUTANEOUS at 21:20

## 2025-05-28 RX ADMIN — Medication 150 MCG: at 12:13

## 2025-05-28 RX ADMIN — OXYCODONE HYDROCHLORIDE 5 MG: 5 TABLET ORAL at 19:59

## 2025-05-28 RX ADMIN — Medication 100 MCG: at 11:46

## 2025-05-28 RX ADMIN — OXYCODONE HYDROCHLORIDE 5 MG: 5 TABLET ORAL at 15:25

## 2025-05-28 RX ADMIN — LIDOCAINE HYDROCHLORIDE 100 MG: 20 INJECTION, SOLUTION INFILTRATION; PERINEURAL at 11:25

## 2025-05-28 RX ADMIN — AMLODIPINE BESYLATE 10 MG: 10 TABLET ORAL at 10:31

## 2025-05-28 RX ADMIN — FENTANYL CITRATE 50 MCG: 50 INJECTION INTRAMUSCULAR; INTRAVENOUS at 13:47

## 2025-05-28 RX ADMIN — DEXAMETHASONE SODIUM PHOSPHATE 10 MG: 4 INJECTION, SOLUTION INTRAMUSCULAR; INTRAVENOUS at 11:30

## 2025-05-28 RX ADMIN — PROPOFOL 30 MG: 10 INJECTION, EMULSION INTRAVENOUS at 13:09

## 2025-05-28 RX ADMIN — PROPOFOL 50 MCG/KG/MIN: 10 INJECTION, EMULSION INTRAVENOUS at 11:45

## 2025-05-28 RX ADMIN — GENTAMICIN SULFATE 300 MG: 40 INJECTION, SOLUTION INTRAMUSCULAR; INTRAVENOUS at 11:30

## 2025-05-28 RX ADMIN — PROPOFOL 120 MG: 10 INJECTION, EMULSION INTRAVENOUS at 11:25

## 2025-05-28 RX ADMIN — FENTANYL CITRATE 50 MCG: 50 INJECTION, SOLUTION INTRAMUSCULAR; INTRAVENOUS at 11:53

## 2025-05-28 RX ADMIN — PROPOFOL 40 MG: 10 INJECTION, EMULSION INTRAVENOUS at 13:03

## 2025-05-28 RX ADMIN — ROCURONIUM BROMIDE 10 MG: 10 INJECTION, SOLUTION INTRAVENOUS at 12:02

## 2025-05-28 RX ADMIN — FENTANYL CITRATE 50 MCG: 50 INJECTION, SOLUTION INTRAMUSCULAR; INTRAVENOUS at 11:18

## 2025-05-28 RX ADMIN — Medication 100 MCG: at 12:35

## 2025-05-28 RX ADMIN — Medication 100 MCG: at 12:22

## 2025-05-28 ASSESSMENT — ENCOUNTER SYMPTOMS
RESPIRATORY NEGATIVE: 1
PALPITATIONS: 0
MUSCULOSKELETAL NEGATIVE: 1
PSYCHIATRIC NEGATIVE: 1
NEAR-SYNCOPE: 0
ALLERGIC/IMMUNOLOGIC NEGATIVE: 1
ORTHOPNEA: 0
CARDIOVASCULAR NEGATIVE: 1
EYES NEGATIVE: 1
HEMATURIA: 1
IRREGULAR HEARTBEAT: 0
DIFFICULTY URINATING: 1
DYSPNEA ON EXERTION: 0
UNEXPECTED WEIGHT CHANGE: 1
PND: 0
GASTROINTESTINAL NEGATIVE: 1
NEUROLOGICAL NEGATIVE: 1
SYNCOPE: 0
ENDOCRINE NEGATIVE: 1
HEMATOLOGIC/LYMPHATIC NEGATIVE: 1

## 2025-05-28 ASSESSMENT — PAIN SCALES - GENERAL
PAINLEVEL_OUTOF10: 7
PAINLEVEL_OUTOF10: 0 - NO PAIN
PAINLEVEL_OUTOF10: 0 - NO PAIN
PAINLEVEL_OUTOF10: 5 - MODERATE PAIN
PAINLEVEL_OUTOF10: 7
PAINLEVEL_OUTOF10: 6
PAINLEVEL_OUTOF10: 6
PAINLEVEL_OUTOF10: 5 - MODERATE PAIN
PAINLEVEL_OUTOF10: 7
PAINLEVEL_OUTOF10: 6

## 2025-05-28 ASSESSMENT — COGNITIVE AND FUNCTIONAL STATUS - GENERAL
DAILY ACTIVITIY SCORE: 22
MOVING TO AND FROM BED TO CHAIR: A LITTLE
CLIMB 3 TO 5 STEPS WITH RAILING: A LITTLE
STANDING UP FROM CHAIR USING ARMS: A LITTLE
MOVING FROM LYING ON BACK TO SITTING ON SIDE OF FLAT BED WITH BEDRAILS: A LITTLE
MOVING FROM LYING ON BACK TO SITTING ON SIDE OF FLAT BED WITH BEDRAILS: A LITTLE
MOVING TO AND FROM BED TO CHAIR: A LITTLE
WALKING IN HOSPITAL ROOM: A LITTLE
MOBILITY SCORE: 18
HELP NEEDED FOR BATHING: A LITTLE
CLIMB 3 TO 5 STEPS WITH RAILING: A LITTLE
TURNING FROM BACK TO SIDE WHILE IN FLAT BAD: A LITTLE
HELP NEEDED FOR BATHING: A LITTLE
MOBILITY SCORE: 18
WALKING IN HOSPITAL ROOM: A LITTLE
DRESSING REGULAR LOWER BODY CLOTHING: A LITTLE
STANDING UP FROM CHAIR USING ARMS: A LITTLE
TURNING FROM BACK TO SIDE WHILE IN FLAT BAD: A LITTLE
DAILY ACTIVITIY SCORE: 22
DRESSING REGULAR LOWER BODY CLOTHING: A LITTLE

## 2025-05-28 ASSESSMENT — PAIN - FUNCTIONAL ASSESSMENT
PAIN_FUNCTIONAL_ASSESSMENT: 0-10
PAIN_FUNCTIONAL_ASSESSMENT: FLACC (FACE, LEGS, ACTIVITY, CRY, CONSOLABILITY)
PAIN_FUNCTIONAL_ASSESSMENT: CPOT (CRITICAL CARE PAIN OBSERVATION TOOL)
PAIN_FUNCTIONAL_ASSESSMENT: 0-10
PAIN_FUNCTIONAL_ASSESSMENT: UNABLE TO SELF-REPORT
PAIN_FUNCTIONAL_ASSESSMENT: 0-10

## 2025-05-28 ASSESSMENT — PAIN DESCRIPTION - DESCRIPTORS
DESCRIPTORS: ACHING

## 2025-05-28 ASSESSMENT — PAIN DESCRIPTION - LOCATION
LOCATION: BACK
LOCATION: BACK

## 2025-05-28 ASSESSMENT — PAIN DESCRIPTION - ORIENTATION
ORIENTATION: RIGHT;LOWER
ORIENTATION: RIGHT

## 2025-05-28 NOTE — ANESTHESIA PREPROCEDURE EVALUATION
Patient: Paul Bruno    Procedure Information       Date/Time: 05/28/25 1215    Procedure: NEPHROLITHOTOMY, PERCUTANEOUS (Right)    Location: [facility] / [facility]    Surgeons: Sanket Willis MD            Relevant Problems   Cardiac   (+) Atrial flutter (Multi)   (+) Benign essential hypertension   (+) Hypertension      GI   (+) Rectal bleeding      /Renal   (+) BPH (benign prostatic hyperplasia)   (+) Prostate cancer (Multi)   (+) Renal calculi       Clinical information reviewed:    Allergies  Meds               NPO Detail:  NPO/Void Status  Date of Last Liquid: 05/26/25  Date of Last Solid: 05/26/25         Physical Exam    Airway  Mallampati: I  TM distance: >3 FB  Neck ROM: full     Cardiovascular    Dental    Pulmonary    Abdominal            Anesthesia Plan    History of general anesthesia?: yes  History of complications of general anesthesia?: no    ASA 3     general     intravenous induction   Anesthetic plan and risks discussed with patient.    Plan discussed with CRNA.

## 2025-05-28 NOTE — NURSING NOTE
Assumed care of pt. Awake, alert & oriented.  Slight pain to back where procedure was yesterday. Neph tube in place to right back- dressing pink drainage noted.   Pt self cathing himself at this time. Call light  within reach.

## 2025-05-28 NOTE — ANESTHESIA PROCEDURE NOTES
Airway  Date/Time: 5/28/2025 11:27 AM  Reason: elective    Airway not difficult    Staffing  Performed: CRNA   Authorized by: Arthur Walters MD    Performed by: JAKI Roy-CRNA, ROGER  Patient location during procedure: OR    Patient Condition  Indications for airway management: anesthesia and airway protection  Patient position: sniffing  Sedation level: deep     Final Airway Details   Preoxygenated: yes  Final airway type: endotracheal airway  Successful airway: ETT  Cuffed: yes   Successful intubation technique: direct laryngoscopy  Adjuncts used in placement: intubating stylet  Endotracheal tube insertion site: oral  Blade: Alanis  Blade size: #2  ETT size (mm): 7.0  Cormack-Lehane Classification: grade IIa - partial view of glottis  Placement verified by: capnometry   Measured from: lips  ETT to lips (cm): 23  Number of attempts at approach: 1

## 2025-05-28 NOTE — ANESTHESIA POSTPROCEDURE EVALUATION
Patient: Paul Bruno    Procedure Summary       Date: 05/28/25 Room / Location: [facility] OR 12 / [facility]    Anesthesia Start: 1118 Anesthesia Stop: 1325    Procedure: NEPHROLITHOTOMY, PERCUTANEOUS WITH STENT PLACEMENT (Right) Diagnosis:       Renal calculi      (Renal calculi [N20.0])    Surgeons: Sanket Willis MD Responsible Provider: Arthur Walters MD    Anesthesia Type: general ASA Status: 3            Anesthesia Type: general    Vitals Value Taken Time   /64 05/28/25 13:25   Temp 36 05/28/25 13:25   Pulse 75 05/28/25 13:25   Resp 22 05/28/25 13:25   SpO2 100 05/28/25 13:25       Anesthesia Post Evaluation    Patient location during evaluation: PACU  Patient participation: waiting for patient participation  Level of consciousness: sedated  Pain management: adequate  Airway patency: patent  Cardiovascular status: acceptable and hemodynamically stable  Respiratory status: acceptable, spontaneous ventilation, face mask and oral airway  Hydration status: acceptable  Postoperative Nausea and Vomiting: none        No notable events documented.

## 2025-05-28 NOTE — CARE PLAN
The patient's goals for the shift include manage pain     The clinical goals for the shift include manage pain

## 2025-05-28 NOTE — CONSULTS
Consults    Reason For Consult  Medical management, hypertension    History Of Present Illness  Paul Bruno is a very pleasant 70 y.o.  male with a past medical history significant for advanced prostate cancer, completed SBRT, now on ADT, BPH, with recent imaging revealing bilateral large renal calculi and several bladder stones presenting for right percutaneous nephrolithotomy.      Yesterday, he was hypertensive with a blood pressure elevated 161/100.  Overnight, he was hypertensive with a blood pressure elevated to 200/98.  He was treated with Clonidine.  Medical consultation was requested for medical management, hypertension.  Repeat am blood pressures elevated, improved 170/80.  Bilateral manual blood pressures 164/60, 150/64.  12 lead EKG shows normal sinus rhythm, no acute ischemia.      At the time of my evaluation, he is resting in bed in no acute distress.  His spouse is present at bedside.  He reports a history of elevated blood pressure with difficulty voiding,  surgeries and pain in the past.  He does not currently have a PCP.  He took medication for elevated blood pressure 3 years ago per his previous PCP though he cannot recall the name of his PCP, nor can her recall the name of the medication.  He informs me that he wore a outpatient cardiac event monitor in the past and was advised the results were normal.  On review of the medical record, in 2022, he was treated by Cardiology Dr. Adame - prescribed Lisinopril and Amlodipine for hypertension.  He states that he has not taken any medication for hypertension in 3 years.  He is currently focused on the  issues.    He reports a headache, nausea, vomiting x 1 last night after taking the Clonidine.  He denies any symptoms since.  He denies any headache, dizziness, chest pain, palpitations, or shortness of breath.  He reports the daniel catheter is in place draining bloody urine.  He denies any other complaints.  He is currently NPO  for  surgery today.        Past Medical History  He has a past medical history of Adverse effect of anesthesia, BPH (benign prostatic hyperplasia), Hypertension, Personal history of irradiation, and Prostate cancer (Multi).    He has no past medical history of Myocardial infarction (Multi), Seizures (Multi), or Stroke (Multi).    Surgical History  He has a past surgical history that includes Other surgical history (07/27/2022).     Social History  He reports that he has been smoking cigars and cigarettes. He started smoking about 54 years ago. He has a 23 pack-year smoking history. He has never used smokeless tobacco. He reports current alcohol use of about 1.0 standard drink of alcohol per week. He reports current drug use. Drug: Marijuana.    Family History  Family History[1]     Allergies  Patient has no known allergies.    Review of Systems   Constitutional:  Positive for unexpected weight change.   HENT: Negative.     Eyes: Negative.    Respiratory: Negative.     Cardiovascular: Negative.    Gastrointestinal: Negative.    Endocrine: Negative.    Genitourinary:  Positive for difficulty urinating and hematuria.   Musculoskeletal: Negative.    Skin: Negative.    Allergic/Immunologic: Negative.    Neurological: Negative.    Hematological: Negative.    Psychiatric/Behavioral: Negative.     All other systems reviewed and are negative.       Physical Exam  Vitals and nursing note reviewed.   Constitutional:       General: He is not in acute distress.     Appearance: Normal appearance. He is normal weight. He is not ill-appearing, toxic-appearing or diaphoretic.   HENT:      Head: Normocephalic and atraumatic.      Right Ear: External ear normal.      Left Ear: External ear normal.      Nose: Nose normal.      Mouth/Throat:      Mouth: Mucous membranes are moist.      Pharynx: Oropharynx is clear.   Eyes:      Extraocular Movements: Extraocular movements intact.      Conjunctiva/sclera: Conjunctivae normal.       Pupils: Pupils are equal, round, and reactive to light.   Cardiovascular:      Rate and Rhythm: Normal rate and regular rhythm.      Pulses: Normal pulses.      Heart sounds: Normal heart sounds. No murmur heard.  Pulmonary:      Effort: Pulmonary effort is normal. No respiratory distress.      Breath sounds: Normal breath sounds. No wheezing, rhonchi or rales.      Comments: Room air.  Abdominal:      General: Bowel sounds are normal.      Palpations: Abdomen is soft.   Genitourinary:     Comments: : Connors catheter in place draining bloody urine no clots. Rectal examination deferred.  Musculoskeletal:         General: No swelling or tenderness. Normal range of motion.      Cervical back: Normal range of motion and neck supple.      Right lower leg: No edema.      Left lower leg: No edema.   Skin:     General: Skin is warm and dry.      Capillary Refill: Capillary refill takes less than 2 seconds.   Neurological:      General: No focal deficit present.      Mental Status: He is alert and oriented to person, place, and time.   Psychiatric:         Mood and Affect: Mood normal.         Behavior: Behavior normal.       Last Recorded Vitals  /64 (BP Location: Right leg)   Pulse 68   Temp 37 °C (98.6 °F) (Oral)   Resp 19   Wt 62.2 kg (137 lb 2 oz)   SpO2 95%     Relevant Results  Results for orders placed or performed during the hospital encounter of 05/27/25 (from the past 24 hours)   POCT PT/INR   Result Value Ref Range    POCT Prothrombin time 13.4 seconds    POCT INR 1.1    Urinalysis with Reflex Culture and Microscopic   Result Value Ref Range    Color, Urine Orange (N) Light-Yellow, Yellow, Dark-Yellow    Appearance, Urine Ex.Turbid (N) Clear    Specific Gravity, Urine 1.007 1.005 - 1.035    pH, Urine 7.5 5.0, 5.5, 6.0, 6.5, 7.0, 7.5, 8.0    Protein, Urine 100 (2+) (A) NEGATIVE, 10 (TRACE), 20 (TRACE) mg/dL    Glucose, Urine Normal Normal mg/dL    Blood, Urine 0.2 (2+) (A) NEGATIVE mg/dL    Ketones,  Urine NEGATIVE NEGATIVE mg/dL    Bilirubin, Urine NEGATIVE NEGATIVE mg/dL    Urobilinogen, Urine Normal Normal mg/dL    Nitrite, Urine NEGATIVE NEGATIVE    Leukocyte Esterase, Urine 500 Sheryl/uL (A) NEGATIVE   Extra Urine Gray Tube   Result Value Ref Range    Extra Tube Hold for add-ons.    Microscopic Only, Urine   Result Value Ref Range    WBC, Urine >50 (A) 1-5, NONE /HPF    RBC, Urine 1-2 NONE, 1-2, 3-5 /HPF    Bacteria, Urine 4+ (A) NONE SEEN /HPF   CBC   Result Value Ref Range    WBC 11.7 (H) 4.4 - 11.3 x10*3/uL    nRBC 0.0 0.0 - 0.0 /100 WBCs    RBC 4.33 (L) 4.50 - 5.90 x10*6/uL    Hemoglobin 10.4 (L) 13.5 - 17.5 g/dL    Hematocrit 32.6 (L) 41.0 - 52.0 %    MCV 75 (L) 80 - 100 fL    MCH 24.0 (L) 26.0 - 34.0 pg    MCHC 31.9 (L) 32.0 - 36.0 g/dL    RDW 16.7 (H) 11.5 - 14.5 %    Platelets 479 (H) 150 - 450 x10*3/uL   Basic metabolic panel   Result Value Ref Range    Glucose 105 (H) 74 - 99 mg/dL    Sodium 135 (L) 136 - 145 mmol/L    Potassium 4.1 3.5 - 5.3 mmol/L    Chloride 102 98 - 107 mmol/L    Bicarbonate 28 21 - 32 mmol/L    Anion Gap 9 (L) 10 - 20 mmol/L    Urea Nitrogen 13 6 - 23 mg/dL    Creatinine 0.89 0.50 - 1.30 mg/dL    eGFR >90 >60 mL/min/1.73m*2    Calcium 9.3 8.6 - 10.3 mg/dL   Electrocardiogram, 12-lead PRN ACS symptoms   Result Value Ref Range    Ventricular Rate 67 BPM    Atrial Rate 67 BPM    OK Interval 164 ms    QRS Duration 92 ms    QT Interval 404 ms    QTC Calculation(Bazett) 426 ms    P Axis 111 degrees    R Axis 49 degrees    T Axis 74 degrees    QRS Count 11 beats    Q Onset 223 ms    P Onset 141 ms    P Offset 171 ms    T Offset 425 ms    QTC Fredericia 419 ms     No results found for the last 90 days.    Electrocardiogram, 12-lead PRN ACS symptoms  Result Date: 5/28/2025  Normal sinus rhythm Low voltage QRS Cannot rule out Anteroseptal infarct (cited on or before 02-JUN-2022) Abnormal ECG When compared with ECG of 28-MAY-2025 09:01, (unconfirmed) No significant change was  found      Scheduled medications  Scheduled Medications[2]  Continuous medications  Continuous Medications[3]  PRN medications  PRN Medications[4]    ASSESSMENT:  Nephrolithiasis  Advanced prostate cancer  BPH  Hypertensive urgency  Hypertension  Tobacco dependence    PLAN:  Discussed with Dr. Dewitt.  Blood pressures reviewed.  BMP pending.  Previous BMP reviewed.  Bun, creatinine stable.  Start Amlodipine 10 mg p.o daily for hypertension.  Repeat and monitor blood pressure.  Cardiology consultation.  Okay for  procedure right percutaneous nephrolithotomy today.  Management per Urology.  Monitor labs.  BMP, CBC in am.  Continue home medications as prescribed, as appropriate.  Tobacco cessation education.  Activity as tolerated.  Fall precautions.  Up with assistance.  DVT prophylaxis.  GI prophylaxis.  Supportive care.  Patient and spouse reassured.  Case management following for discharge planning.  We will take DVT, fall, aspiration and decubitus precautions during his stay in the hospital.  Orders written per Dr. Dewitt.  Any additions or modifications at his discretion.  Thank you for this consultation.  We will follow along.      *Cardiology input appreciated.  Management per recommendations.  Signed off.       JAKI Lewis-SG         [1]   Family History  Problem Relation Name Age of Onset    Hypertension Other grandparent    [2] amLODIPine, 10 mg, oral, Daily  docusate sodium, 100 mg, oral, BID  heparin (porcine), 5,000 Units, subcutaneous, q8h  [3] lactated Ringer's, 100 mL/hr, Last Rate: 100 mL/hr (05/28/25 0824)  [4] PRN medications: acetaminophen, morphine, naloxone, ondansetron **OR** ondansetron, oxyCODONE

## 2025-05-28 NOTE — OP NOTE
NEPHROLITHOTOMY, PERCUTANEOUS WITH STENT PLACEMENT (R) Operative Note     Date: 2025  OR Location: [facility]    Name: Paul Bruno, : 1955, Age: 70 y.o., MRN: [id number], Sex: male    Diagnosis  Pre-op Diagnosis      * Renal calculi [N20.0] Post-op Diagnosis     * Renal calculi [N20.0]     Procedures  NEPHROLITHOTOMY, PERCUTANEOUS WITH STENT PLACEMENT  78801 - MA PERQ NL/PL LITHOTRP COMPLEX >2 CM MLT LOCATIONS      Surgeons      * Sanket Willis - Primary    Resident/Fellow/Other Assistant:  Surgeons and Role:  * No surgeons found with a matching role *    Staff:   Circulator: Anna Baldwin Person: Kaela  Circulator: Kaela Gusman Scrub: Trinity    Anesthesia Staff: Anesthesiologist: Arthur Walters MD  CRNA: JAKI Roy-ROBERTO, ROGER    Procedure Summary  Anesthesia: General  ASA: III  Estimated Blood Loss: 100 mL  Intra-op Medications:   Administrations occurring from 1215 to 1415 on 25:   Medication Name Total Dose   heparin (porcine) injection 5,000 Units Cannot be calculated   lactated Ringer's infusion Cannot be calculated   phenylephrine 100 mcg/mL syringe 10 mL (prefilled) 300 mcg   propofol (Diprivan) injection 10 mg/mL 70 mg   rocuronium (ZeMuron) 50 mg/5 mL injection 20 mg   sugammadex (Bridion) 200 mg/2 mL injection 200 mg              Anesthesia Record               Intraprocedure I/O Totals          Intake    lactated Ringer's infusion 1000.00 mL    gentamicin (Garamycin) 300 mg in dextrose 5% 100 mL .50 mL    Total Intake 1107.5 mL          Specimen:   ID Type Source Tests Collected by Time   A : RIGHT KIDNEY STONE Calculus Ureter, Right CALCULI (STONE) ANALYSIS Sanket Willis MD 2025 1232                 Drains and/or Catheters:   Urethral Catheter Latex 20 Fr. (Active)       Tourniquet Times:         Implants:  Implants       Type Name Action Serial No.      Stent STENT, INLAY OPTIMA, 6FR X 26CM - [id number] Implanted               Findings: 1. Large complex  staghorn stone occupying renal pelvis, lower pole calyces.  Dimensions 3 x 4 x 3 cm.     Indications: Paul Bruno is an 70 y.o. male who is having surgery for Renal calculi [N20.0].     The patient was seen in the preoperative area. The risks, benefits, complications, treatment options, non-operative alternatives, expected recovery and outcomes were discussed with the patient. The possibilities of reaction to medication, pulmonary aspiration, injury to surrounding structures, bleeding, recurrent infection, the need for additional procedures, failure to diagnose a condition, and creating a complication requiring transfusion or operation were discussed with the patient. The patient concurred with the proposed plan, giving informed consent.  The site of surgery was properly noted/marked if necessary per policy. The patient has been actively warmed in preoperative area. Preoperative antibiotics have been ordered and given within 1 hours of incision. Venous thrombosis prophylaxis have been ordered including bilateral sequential compression devices    Procedure Details: The patient brought to the operating room table.  General anesthesia was induced.  A Connors catheter was placed per urethra to drain the bladder.  The patient was placed in a prone position.  His right flank was prepped and draped in usual sterile fashion.  We first obtained a fluoroscopic image projected over the right kidney.  We visualized stone burden.  We next obtained a stiff guidewire and introduced this into the previously placed ureteral catheter.  Using fluoroscopy we advanced this wire down towards the bladder.  A second guidewire was introduced into the second ureteral catheter.  Once again this was advanced down towards the bladder using fluoroscopy.  Both ureteral catheters were then removed.  We next obtained a scalpel and made a stab incision at the percutaneous entry site.  We carefully dissected the tissues down towards the fascia.  We  next obtained our Wevod NephroMax balloon dilator.  Using fluoroscopy we advanced the balloon to the level of the stones within the right kidney.  We next performed a dilation of our nephrostomy tract.  Once we had adequate dilation we advanced our 30 Upper sorbian sheath over the balloon.  The balloon was then deflated and removed.  We next obtained our rigid nephroscope and began our nephroscopy.  We were able to navigate away into the collecting system.  We entered the renal pelvis and visualized a very large stone.  We introduced our swiss LithoClast probe and began our lithotripsy.  We fragmented the stones and simultaneously suction out the fragments.  We were able to clear out the renal pelvis.  We identified the UPJ.  At this point we then switched to a flexible scope and we began to inspect the calyces.  We were able to navigate away into the lower pole calyx.  We visualized a large stone burden here.  We introduced a 365 µm laser fiber and resumed our lithotripsy.  We used holmium laser fiber and we fragmented the stones in the lower pole.  We next introduced a tipless stone basket and remove the fragments individually.A large amount of stone burden was removed from the lower pole calyx.  Once we are satisfied with our stone clearance we then switched to the rigid scope once again.  The renal pelvis appeared to be free of any stone.  At this point we decided to leave a stent.  We obtained a 6 Upper sorbian by 26 cm double-J stent.  We advanced the stent over our guidewire down into the right renal collecting system.  The wire was removed and we visualized a good proximal distal curl.  At this point we then removed our scope along with our access sheath.  We then applied direct pressure to the flank for several minutes.  Once we had good hemostasis we then closed our incision.  We used a 3-0 Vicryl suture and reapproximated the tissues.  We then used a 4-0 Monocryl suture and reapproximated the skin.  At the end we  applied a dry sterile dressing.  The patient was then repositioned in the supine position.  She was awakened and brought to the recovery room in stable condition.  Evidence of Infection: No   Complications:  None; patient tolerated the procedure well.    Disposition: PACU - hemodynamically stable.  Condition: stable                 Additional Details:     Attending Attestation: I was present and scrubbed for the entire procedure.    Sanket Willis  Phone Number: [phone]

## 2025-05-28 NOTE — NURSING NOTE
Dr. Willis secure messaged about no admission orders. Dr. Willis put in orders right now. Care ongoing.

## 2025-05-28 NOTE — NURSING NOTE
Pt returned back to floor. Awake,  & alert, but drowsy. 2l nc in place. Wife at bedside. New dressing to right back cdi. C.o pain to incision area. Medicated per orders.   Connors in place with red tinged drainage. Placed in OR with do not remove order. Call light within reach. Bed alarm on.

## 2025-05-28 NOTE — CARE PLAN
Problem: Pain  Goal: Takes deep breaths with improved pain control throughout the shift  Outcome: Progressing  Goal: Turns in bed with improved pain control throughout the shift  Outcome: Progressing  Goal: Walks with improved pain control throughout the shift  Outcome: Progressing  Goal: Performs ADL's with improved pain control throughout shift  Outcome: Progressing  Goal: Participates in PT with improved pain control throughout the shift  Outcome: Progressing  Goal: Free from opioid side effects throughout the shift  Outcome: Progressing  Goal: Free from acute confusion related to pain meds throughout the shift  Outcome: Progressing   The patient's goals for the shift include Eat and try to warm up    The clinical goals for the shift include monitor for s/s of sepsis and pain control.

## 2025-05-28 NOTE — CONSULTS
"Inpatient consult to Cardiology  Consult performed by: ROLF Calles  Consult ordered by: ROLF Lewis  Reason for consult: Longstanding hypertension        History Of Present Illness:    Paul Bruno is a 70 y.o. male presenting with urinary complaints.  Has previously followed with Dr. Sinlgeton.  Past medical history of hypertension and prostate hypertrophy.scheduled for urinary procedure with Dr. Kelley.  In hospital has been hypertensive.  Has a long history of hypertension patient reports that his hypertension is only evident when in the hospital, however review of history over the past 5 years with different visits in offices and in hospital reveals a consistent hypertension with systolic above 145 and often above 180.  Patient reports no chest pain or pressure palpitations or feeling of rapid heart rate.  EKG with a sinus rhythm with evidence of anterior septal infarct with baseline sway.  Glucose 105.  Creatinine 0.89 with a potassium of 4.1.  White blood cell count 11.7 with hemoglobin of 10.4.  Blood pressure on admission systolic 166.  This increased to a systolic of 198 before improving back to a systolic of mid 160s after oral clonidine.  The patient does report that he previously did take antihypertensive medications which included amlodipine as well as lisinopril, however his blood pressure was trending normotensive at that time which was accompanied by significant dizziness.  He stopped his medications a few years ago.      Last Recorded Vitals:  Vitals:    05/28/25 0814 05/28/25 0851 05/28/25 0852 05/28/25 1045   BP: 170/80 164/60 158/64 (!) 144/100   BP Location: Left arm Left arm Right leg Left arm   Patient Position: Lying Lying  Sitting   Pulse: 68   72   Resp: 19   20   Temp: 37 °C (98.6 °F)   36.7 °C (98.1 °F)   TempSrc: Oral   Temporal   SpO2: 95%   97%   Weight:    62.2 kg (137 lb 2 oz)   Height:    1.88 m (6' 2.02\")       Last Labs:  CBC - 5/28/2025:  8:20 " "AM  11.7 10.4 479    32.6      [facility] - 5/28/2025:  8:20 AM  9.3 8.2 11 --- 0.4   _ 3.9 10 103      PTT - No results in last year.  1.1   13.4 _     No results found for: \"TROPHS\", \"BNP\", \"HGBA1C\", \"LDLCALC\", \"VLDL\"   Last I/O:  I/O last 3 completed shifts:  In: 306.7 (4.9 mL/kg) [P.O.:240; I.V.:66.7 (1.1 mL/kg)]  Out: 540 (8.7 mL/kg) [Urine:490 (0.2 mL/kg/hr); Blood:50]  Weight: 62.2 kg     Past Cardiology Tests (Last 3 Years):  EKG:  Electrocardiogram, 12-lead PRN ACS symptoms 05/28/2025 (Preliminary): Sinus with evidence of anterior septal infarct    Echo: 2022: Ejection fraction 70%    Past Medical History:  He has a past medical history of Adverse effect of anesthesia, BPH (benign prostatic hyperplasia), Hypertension, Personal history of irradiation, and Prostate cancer (Multi).    He has no past medical history of Myocardial infarction (Multi), Seizures (Multi), or Stroke (Multi).    Past Surgical History:  He has a past surgical history that includes Other surgical history (07/27/2022).      Social History:  He reports that he has been smoking cigars and cigarettes. He started smoking about 54 years ago. He has a 23 pack-year smoking history. He has never used smokeless tobacco. He reports current alcohol use of about 1.0 standard drink of alcohol per week. He reports current drug use. Drug: Marijuana.    Family History:  Family History[1]     Allergies:  Patient has no known allergies.    Inpatient Medications:  Scheduled Medications[2]  PRN Medications[3]  Continuous Medications[4]  Outpatient Medications:  Current Outpatient Medications   Medication Instructions    acetaminophen (TYLENOL) 1,000 mg, Every 8 hours PRN    ascorbic acid, vitamin C, 1,000 mg ER tablet 1 tablet, Daily    aspirin-acetaminophen-caffeine (Excedrin Migraine) 250-250-65 mg tablet 2 tablets, Every 6 hours PRN    cyanocobalamin (VITAMIN B-12) 100 mcg, Daily    ferrous sulfate 325 mg (65 mg elemental) tablet 325 mg of ferrous sulfate, " Daily with breakfast    multivitamin with minerals tablet 1 tablet, Daily    tamsulosin (FLOMAX) 0.8 mg, oral, Daily    TURMERIC ORAL 1,000 mg, Daily    vitamin E acetate (VITAMIN E ORAL) 1 capsule, Daily   Review of Systems   Cardiovascular:  Negative for chest pain, dyspnea on exertion, irregular heartbeat, leg swelling, near-syncope, orthopnea, palpitations, paroxysmal nocturnal dyspnea and syncope.       Physical Exam:  General: alert, oriented x3, pleasant   HEENT: normal cephalic, atraumatic, no scleral icterus  Neck: No JVD, bruit or thrill, masses or tenderness   Heart: S1/S2, Rate 70, Rhythm regular, no s3 or s4, no murmur, thrill, or heaves at PMI.   Lungs: Clear, equal expansion and excursion, no wheezes, crackles, rhales or rhonci room air.  No conversational dyspnea prescient.  No tachypnea.  No pain with deep inspiration.   Abdomen: bowel sounds x 4, soft, non-tender   Genitourinary: deferred   Extremities: No significant upper or lower extremity CHANO appreciated.    Assessment/Plan     Right NEPHROLITHOTOMY   Longstanding hypertension  BPH    Overall impression:    5/28: Consulted for elevated blood pressure.  This is a chronic problem for this patient.  He states his blood pressure is only elevated when he is having issues with his urinary symptoms, however this is been persistent over the past 5 years.  He was previously on amlodipine as well as lisinopril.  Reports his blood pressure was normal at that time, however a normal blood pressure with systolic in 120s makes him significantly dizzy and fatigue.  He was unable to tolerate combination of medicine.  He stopped his medications years ago.  He stated he was stable on amlodipine alone.  He has hesitant to restart medications but is agreeable at this point to start amlodipine as a single agent.  I have informed him that I certainly believe he has chronic hypertension that is not only relative to just his urinary symptoms.  I have encouraged the  patient to follow-up again with his cardiologist in the outpatient setting Dr. Adame for reevaluation and further management of his blood pressure.  Furthermore he did have an outpatient ZIO monitor years ago and it was determined he does not have a history of atrial fibrillation or flutter based on that monitor.  Most recent blood pressure with systolic in the 150s.  Continue with amlodipine.  May utilize an alpha-blocker IV intraprocedure if necessary.  Plan for patient to discharge his home the afternoon per urology.  Agree with this.  Reports no chest pain he is euvolemic.  His last echo in 2022 with a preserved ejection fraction.  Will sign off.    Code Status:  Full Code    I spent 45 minutes in the professional and overall care of this patient.        JAKI Calles-CNP       [1]   Family History  Problem Relation Name Age of Onset    Hypertension Other grandparent    [2]   Scheduled medications   Medication Dose Route Frequency    amLODIPine  10 mg oral Daily    docusate sodium  100 mg oral BID    heparin (porcine)  5,000 Units subcutaneous q8h   [3]   PRN medications   Medication    acetaminophen    morphine    naloxone    ondansetron    Or    ondansetron    oxyCODONE   [4]   Continuous Medications   Medication Dose Last Rate    lactated Ringer's  100 mL/hr 100 mL/hr (05/28/25 0824)

## 2025-05-28 NOTE — NURSING NOTE
Pt systolic BP has remained greater than 180 throughout the night. Pt denies history of HTN. Previously endorsed headache at 2/10, denies blurry vision, dizziness, lightheadedness, or SOB. All other VS WNL. Attending physician aware.

## 2025-05-29 VITALS
RESPIRATION RATE: 17 BRPM | WEIGHT: 134.92 LBS | SYSTOLIC BLOOD PRESSURE: 122 MMHG | DIASTOLIC BLOOD PRESSURE: 66 MMHG | TEMPERATURE: 99 F | BODY MASS INDEX: 17.32 KG/M2 | HEART RATE: 69 BPM | OXYGEN SATURATION: 94 % | HEIGHT: 74 IN

## 2025-05-29 LAB
ANION GAP SERPL CALCULATED.3IONS-SCNC: 9 MMOL/L (ref 10–20)
BUN SERPL-MCNC: 20 MG/DL (ref 6–23)
CALCIUM SERPL-MCNC: 9.1 MG/DL (ref 8.6–10.3)
CHLORIDE SERPL-SCNC: 100 MMOL/L (ref 98–107)
CO2 SERPL-SCNC: 29 MMOL/L (ref 21–32)
CREAT SERPL-MCNC: 0.94 MG/DL (ref 0.5–1.3)
EGFRCR SERPLBLD CKD-EPI 2021: 86 ML/MIN/1.73M*2
ERYTHROCYTE [DISTWIDTH] IN BLOOD BY AUTOMATED COUNT: 17 % (ref 11.5–14.5)
GLUCOSE SERPL-MCNC: 102 MG/DL (ref 74–99)
HCT VFR BLD AUTO: 29.3 % (ref 41–52)
HGB BLD-MCNC: 9.6 G/DL (ref 13.5–17.5)
MCH RBC QN AUTO: 23.7 PG (ref 26–34)
MCHC RBC AUTO-ENTMCNC: 32.8 G/DL (ref 32–36)
MCV RBC AUTO: 72 FL (ref 80–100)
NRBC BLD-RTO: 0 /100 WBCS (ref 0–0)
PLATELET # BLD AUTO: 477 X10*3/UL (ref 150–450)
POTASSIUM SERPL-SCNC: 4.2 MMOL/L (ref 3.5–5.3)
RBC # BLD AUTO: 4.05 X10*6/UL (ref 4.5–5.9)
SODIUM SERPL-SCNC: 134 MMOL/L (ref 136–145)
WBC # BLD AUTO: 14.1 X10*3/UL (ref 4.4–11.3)

## 2025-05-29 PROCEDURE — 36415 COLL VENOUS BLD VENIPUNCTURE: CPT | Performed by: NURSE PRACTITIONER

## 2025-05-29 PROCEDURE — 80048 BASIC METABOLIC PNL TOTAL CA: CPT | Performed by: NURSE PRACTITIONER

## 2025-05-29 PROCEDURE — 2500000004 HC RX 250 GENERAL PHARMACY W/ HCPCS (ALT 636 FOR OP/ED): Mod: JZ | Performed by: NURSE PRACTITIONER

## 2025-05-29 PROCEDURE — 2500000001 HC RX 250 WO HCPCS SELF ADMINISTERED DRUGS (ALT 637 FOR MEDICARE OP): Performed by: SURGERY

## 2025-05-29 PROCEDURE — 85027 COMPLETE CBC AUTOMATED: CPT | Performed by: NURSE PRACTITIONER

## 2025-05-29 PROCEDURE — 2500000004 HC RX 250 GENERAL PHARMACY W/ HCPCS (ALT 636 FOR OP/ED): Performed by: SURGERY

## 2025-05-29 PROCEDURE — 99238 HOSP IP/OBS DSCHRG MGMT 30/<: CPT | Performed by: SURGERY

## 2025-05-29 RX ORDER — OXYCODONE AND ACETAMINOPHEN 5; 325 MG/1; MG/1
1 TABLET ORAL EVERY 6 HOURS PRN
Qty: 15 TABLET | Refills: 0 | Status: SHIPPED | OUTPATIENT
Start: 2025-05-29

## 2025-05-29 RX ORDER — LEVOFLOXACIN 5 MG/ML
750 INJECTION, SOLUTION INTRAVENOUS
Status: DISCONTINUED | OUTPATIENT
Start: 2025-05-29 | End: 2025-05-29 | Stop reason: HOSPADM

## 2025-05-29 RX ORDER — LEVOFLOXACIN 500 MG/1
500 TABLET, FILM COATED ORAL DAILY
Qty: 5 TABLET | Refills: 0 | Status: SHIPPED | OUTPATIENT
Start: 2025-05-29 | End: 2025-06-03

## 2025-05-29 RX ADMIN — AMLODIPINE BESYLATE 10 MG: 10 TABLET ORAL at 08:37

## 2025-05-29 RX ADMIN — HEPARIN SODIUM 5000 UNITS: 5000 INJECTION, SOLUTION INTRAVENOUS; SUBCUTANEOUS at 05:01

## 2025-05-29 RX ADMIN — LEVOFLOXACIN 750 MG: 750 INJECTION, SOLUTION INTRAVENOUS at 09:39

## 2025-05-29 RX ADMIN — OXYCODONE HYDROCHLORIDE 5 MG: 5 TABLET ORAL at 06:09

## 2025-05-29 RX ADMIN — OXYCODONE HYDROCHLORIDE 5 MG: 5 TABLET ORAL at 01:48

## 2025-05-29 RX ADMIN — OXYCODONE HYDROCHLORIDE 5 MG: 5 TABLET ORAL at 10:09

## 2025-05-29 ASSESSMENT — COGNITIVE AND FUNCTIONAL STATUS - GENERAL
HELP NEEDED FOR BATHING: A LITTLE
MOBILITY SCORE: 21
DAILY ACTIVITIY SCORE: 22
WALKING IN HOSPITAL ROOM: A LITTLE
STANDING UP FROM CHAIR USING ARMS: A LITTLE
DRESSING REGULAR LOWER BODY CLOTHING: A LITTLE
CLIMB 3 TO 5 STEPS WITH RAILING: A LITTLE

## 2025-05-29 ASSESSMENT — PAIN - FUNCTIONAL ASSESSMENT
PAIN_FUNCTIONAL_ASSESSMENT: 0-10
PAIN_FUNCTIONAL_ASSESSMENT: FLACC (FACE, LEGS, ACTIVITY, CRY, CONSOLABILITY)
PAIN_FUNCTIONAL_ASSESSMENT: 0-10
PAIN_FUNCTIONAL_ASSESSMENT: FLACC (FACE, LEGS, ACTIVITY, CRY, CONSOLABILITY)

## 2025-05-29 ASSESSMENT — PAIN SCALES - GENERAL
PAINLEVEL_OUTOF10: 0 - NO PAIN
PAINLEVEL_OUTOF10: 4
PAINLEVEL_OUTOF10: 3
PAINLEVEL_OUTOF10: 3
PAINLEVEL_OUTOF10: 4
PAINLEVEL_OUTOF10: 0 - NO PAIN

## 2025-05-29 ASSESSMENT — PAIN DESCRIPTION - DESCRIPTORS
DESCRIPTORS: ACHING

## 2025-05-29 NOTE — NURSING NOTE
Took over care of pt at this time. Pt laying in bed, alert, aox4. Female visitor at bedside. Pt acknowledges pain and is ready for intervention when available. Pt has no other needs or complaints at this time. Connors output is clear, red. Pt oriented to call bell and it and belongings are placed in reach. NC O2 in place and connected to flowmeter. Bed alarm set. Continuing to monitor.

## 2025-05-29 NOTE — NURSING NOTE
Rounded on pt. Pt laying in bed, eyes closed, respirations even and unlabored. Pt appears to be sleeping and in no apparent pain or distress. Call bell and belongings are placed in reach. NC O2 in place and connected to flowmeter. Bed alarm set. Continuing to monitor.    [FreeTextEntry8] : wants refill of klonopin ... takes prn    for anxiety attacks. \par \par is on wellbutrin  for depression/anxiety   for the last year.  \par \par feeling a lot of anxiety,     feels a lot of irritability and mood swings.  \par \raúl works in construction.

## 2025-05-29 NOTE — CARE PLAN
Problem: Pain  Goal: Takes deep breaths with improved pain control throughout the shift  Outcome: Progressing  Goal: Turns in bed with improved pain control throughout the shift  Outcome: Progressing  Goal: Walks with improved pain control throughout the shift  Outcome: Progressing  Goal: Performs ADL's with improved pain control throughout shift  Outcome: Progressing  Goal: Participates in PT with improved pain control throughout the shift  Outcome: Progressing  Goal: Free from opioid side effects throughout the shift  Outcome: Progressing  Goal: Free from acute confusion related to pain meds throughout the shift  Outcome: Progressing     Problem: Pain - Adult  Goal: Verbalizes/displays adequate comfort level or baseline comfort level  Outcome: Progressing     Problem: Safety - Adult  Goal: Free from fall injury  Outcome: Progressing     Problem: Discharge Planning  Goal: Discharge to home or other facility with appropriate resources  Outcome: Progressing     Problem: Chronic Conditions and Co-morbidities  Goal: Patient's chronic conditions and co-morbidity symptoms are monitored and maintained or improved  Outcome: Progressing     Problem: Nutrition  Goal: Nutrient intake appropriate for maintaining nutritional needs  Outcome: Progressing     Problem: Skin  Goal: Decreased wound size/increased tissue granulation at next dressing change  Outcome: Progressing  Goal: Participates in plan/prevention/treatment measures  Outcome: Progressing  Goal: Promote/optimize nutrition  Outcome: Progressing  Goal: Promote skin healing  Outcome: Progressing   The patient's goals for the shift include Eat and try to warm up    The clinical goals for the shift include manage pain    Over the shift, the patient did make progress toward the goals.

## 2025-05-29 NOTE — DISCHARGE SUMMARY
Discharge Diagnosis  Renal calculi    Issues Requiring Follow-Up  Stent removal in office.      Test Results Pending At Discharge  Pending Labs       Order Current Status    Calculi (Stone) Analysis In process    Extra Tubes In process    Light Blue Top In process    POCT PT/INR In process    Urine Culture Preliminary result            Hospital Course   He was admitted for elective surgery.  On May 27 he underwent IR guided placement of right ureteral catheters.  After the procedure he did have severe postprocedure pain and hypertension.  He was admitted.  Medicine was consulted for blood pressure management.  He was started on amlodipine.  On hospital day #2 he underwent the right percutaneous nephrolithotomy.  The procedure was uneventful.  He then returned to the surgical floor.  He remained on IV fluid hydration.  He was given narcotics for postoperative pain.  On hospital day #3 his Connors catheter was removed.  He did have postop labs which were unremarkable.  His pain was well-controlled.  He was tolerating his diet.  He was discharged home on hospital day #3.    Pertinent Physical Exam At Time of Discharge  Physical Exam  Awake, alert  Breathing comfortably  Abdomen soft, ND, NT  Incision C/D/I      Home Medications     Medication List      START taking these medications     amLODIPine 10 mg tablet; Commonly known as: Norvasc; Take 1 tablet (10   mg) by mouth once daily.   levoFLOXacin 500 mg tablet; Commonly known as: Levaquin; Take 1 tablet   (500 mg) by mouth once daily for 5 days.   oxyCODONE-acetaminophen 5-325 mg tablet; Commonly known as: Percocet;   Take 1 tablet by mouth every 6 hours if needed for severe pain (7 - 10).     CONTINUE taking these medications     acetaminophen 500 mg tablet; Commonly known as: Tylenol   ascorbic acid (vitamin C) 1,000 mg ER tablet   aspirin-acetaminophen-caffeine 250-250-65 mg tablet; Commonly known as:   Excedrin Migraine   cyanocobalamin 100 mcg tablet; Commonly  known as: Vitamin B-12   ferrous sulfate 325 mg (65 mg elemental) tablet   multivitamin with minerals tablet   tamsulosin 0.4 mg 24 hr capsule; Commonly known as: Flomax; Take 2   capsules (0.8 mg) by mouth once daily.   TURMERIC ORAL   VITAMIN E ORAL       Outpatient Follow-Up  Future Appointments   Date Time Provider Department Center   6/27/2025  8:00 AM [id number]   6/27/2025 10:00 AM Skip Mehta MD [id number]   10/6/2025 11:00 AM Miguel Angel Clark MD [id number] [place]       Sanket Willis MD

## 2025-05-29 NOTE — NURSING NOTE
Spoke to Dr. Willis at this time. He asked how pt was doing. I informed him pt's output was still a bright, cherry red, thin and clear. His pain was managed with oxycodone. Earlier in night reporting 5-6 pain and later in night when he just awoke reported 3-4. Dr. Willis ordered we can take the daniel out that the output color is to be expected and is okay. He states he will be around later this afternoon to discharge the pt home. Will inform dayshift.

## 2025-05-29 NOTE — PROGRESS NOTES
Paul Bruno is a 70 y.o. male on day 2 of admission presenting with Renal calculi.    Subjective   Patient seen and examined.  Resting in bed in no acute distress.  Spouse and nursing bedside.  Awake alert oriented x 3.  Better.  Pain, controlled.  Connors catheter removed.  Voiding though difficulty voiding.  Took 2 Flomax tablets from home this am.  No headache, dizziness, chest pain, shortness of breath, nausea, vomiting.  Asking about discharge home.    Spoke with nursing, no new issues.    Objective     Physical Exam  Vitals and nursing note reviewed.   Constitutional:       General: He is not in acute distress.     Appearance: Normal appearance. He is normal weight. He is not ill-appearing, toxic-appearing or diaphoretic.   HENT:      Head: Normocephalic and atraumatic.      Right Ear: External ear normal.      Left Ear: External ear normal.      Nose: Nose normal.      Mouth/Throat:      Mouth: Mucous membranes are moist.      Pharynx: Oropharynx is clear.   Eyes:      Extraocular Movements: Extraocular movements intact.      Conjunctiva/sclera: Conjunctivae normal.      Pupils: Pupils are equal, round, and reactive to light.   Cardiovascular:      Rate and Rhythm: Normal rate and regular rhythm.      Pulses: Normal pulses.      Heart sounds: Normal heart sounds. No murmur heard.  Pulmonary:      Effort: Pulmonary effort is normal. No respiratory distress.      Breath sounds: Normal breath sounds. No wheezing, rhonchi or rales.      Comments: Room air.  Abdominal:      General: Bowel sounds are normal. There is no distension.      Palpations: Abdomen is soft.      Tenderness: There is no abdominal tenderness. There is no guarding.   Genitourinary:     Comments: : Clear red bloody tinged urine in urinal.  Musculoskeletal:         General: No swelling or tenderness. Normal range of motion.      Cervical back: Normal range of motion and neck supple.      Right lower leg: No edema.      Left lower leg: No  "edema.   Skin:     General: Skin is warm and dry.      Capillary Refill: Capillary refill takes less than 2 seconds.   Neurological:      General: No focal deficit present.      Mental Status: He is alert and oriented to person, place, and time.   Psychiatric:         Mood and Affect: Mood normal.         Behavior: Behavior normal.         Last Recorded Vitals  Blood pressure 138/70, pulse 65, temperature 37.2 °C (99 °F), temperature source Oral, resp. rate 17, height 1.88 m (6' 2.02\"), weight 61.2 kg (134 lb 14.7 oz), SpO2 93%.    Intake/Output last 3 Shifts:  I/O last 3 completed shifts:  In: 1907.5 (31.2 mL/kg) [P.O.:540; I.V.:1260 (20.6 mL/kg); IV Piggyback:107.5]  Out: 2840 (46.4 mL/kg) [Urine:2840 (1.3 mL/kg/hr)]  Weight: 61.2 kg     Relevant Results  Results for orders placed or performed during the hospital encounter of 05/27/25 (from the past 24 hours)   CBC   Result Value Ref Range    WBC 14.1 (H) 4.4 - 11.3 x10*3/uL    nRBC 0.0 0.0 - 0.0 /100 WBCs    RBC 4.05 (L) 4.50 - 5.90 x10*6/uL    Hemoglobin 9.6 (L) 13.5 - 17.5 g/dL    Hematocrit 29.3 (L) 41.0 - 52.0 %    MCV 72 (L) 80 - 100 fL    MCH 23.7 (L) 26.0 - 34.0 pg    MCHC 32.8 32.0 - 36.0 g/dL    RDW 17.0 (H) 11.5 - 14.5 %    Platelets 477 (H) 150 - 450 x10*3/uL   Basic Metabolic Panel   Result Value Ref Range    Glucose 102 (H) 74 - 99 mg/dL    Sodium 134 (L) 136 - 145 mmol/L    Potassium 4.2 3.5 - 5.3 mmol/L    Chloride 100 98 - 107 mmol/L    Bicarbonate 29 21 - 32 mmol/L    Anion Gap 9 (L) 10 - 20 mmol/L    Urea Nitrogen 20 6 - 23 mg/dL    Creatinine 0.94 0.50 - 1.30 mg/dL    eGFR 86 >60 mL/min/1.73m*2    Calcium 9.1 8.6 - 10.3 mg/dL     No results found for the last 90 days.    Electrocardiogram, 12-lead PRN ACS symptoms  Result Date: 5/28/2025  Normal sinus rhythm Low voltage QRS Cannot rule out Anteroseptal infarct (cited on or before 02-JUN-2022) Abnormal ECG When compared with ECG of 02-JUN-2022 08:17, No significant change was found Confirmed by " Torito Colmenares ([id number]) on 5/28/2025 8:09:03 PM    IR  nephroureteral placement  Result Date: 5/28/2025  Interpreted By:  Steven Roy, STUDY: US GUIDED PERCUTANEOUS PLACEMENT; IR  NEPHROURETERAL PLACEMENT; 5/27/2025 2:33 pm; 5/28/2025 7:08 am   INDICATION: Staghorn calculus right kidney. Presents for percutaneous insertion of nephroureteral access catheter in preparation for lithotripsy   COMPARISON: None   ACCESSION NUMBER(S): [id number]; [id number]   ORDERING CLINICIAN: THAI NIEVES   TECHNIQUE: Conscious sedation: 58 minutes. Ultrasound-guided needle placement Insertion of nephroureteral catheter Fluoroscopy time 16.9 minutes, images 9, dose 99.87 mGy air kerma.   FINDINGS: Informed consent obtained. Patient positioned prone and connected physiologic monitoring. Conscious sedation provided Versed and fentanyl. Right flank prepped, draped and anesthetized. Ultrasound right kidney demonstrated severe hydronephrosis. Large staghorn calculus in lower pole calices-renal pelvis. Under ultrasound guidance, 18 gauge Chiba needle advanced to lower pole calyx containing part of the calculus. Remainder procedure performed under fluoroscopy guidance. Contrast injection confirmed needle position. The needle was exchanged over stiff Glidewire for 4 Greenlandic Kumpe catheter. Wire and catheter manipulation performed to navigate up to the ureteropelvic junction. The angle of the renal pelvis calculus in ureter necessitated exchanged for a rim catheter. The wire would not progress beyond couple of cm proximal ureter. Contrast injection confirmed presence of a small calculus in the upper ureter. This was successfully navigated with an 014 wire and quick cross catheter. Subsequently this was exchanged for 6 Greenlandic sheath allowing for passage of 2 parallel 035 guidewires with distal ends in the urinary bladder. The sheath was exchanged for 2 4 Greenlandic Kumpe catheters with nephroureteral access. The catheters were capped, secured  with sutures and covered with sterile dressing. Of note, the urine within the collecting system upon accessing with needle was purulent. Therefore, decision made to admit patient for overnight observation with scheduled lithotripsy on the subsequent day.       Ultrasound right kidney reveals severe hydronephrosis with large burden staghorn calculus. The aspirated urine after needle access was purulent.   Successful insertion of two small caliber catheters with nephroureteral access in preparation for scheduled lithotripsy.   Plan: Given the pyonephrosis, patient was admitted overnight for observation/intravenous antibiotics in expectation for lithotripsy scheduled on subsequent day.     Signed by: Steven Roy 5/28/2025 3:56 PM Dictation workstation:   [id number]    US guided percutaneous placement  Result Date: 5/28/2025  Interpreted By:  Steven Roy, STUDY: US GUIDED PERCUTANEOUS PLACEMENT; IR  NEPHROURETERAL PLACEMENT; 5/27/2025 2:33 pm; 5/28/2025 7:08 am   INDICATION: Staghorn calculus right kidney. Presents for percutaneous insertion of nephroureteral access catheter in preparation for lithotripsy   COMPARISON: None   ACCESSION NUMBER(S): [id number]; [id number]   ORDERING CLINICIAN: THAI NIEVES   TECHNIQUE: Conscious sedation: 58 minutes. Ultrasound-guided needle placement Insertion of nephroureteral catheter Fluoroscopy time 16.9 minutes, images 9, dose 99.87 mGy air kerma.   FINDINGS: Informed consent obtained. Patient positioned prone and connected physiologic monitoring. Conscious sedation provided Versed and fentanyl. Right flank prepped, draped and anesthetized. Ultrasound right kidney demonstrated severe hydronephrosis. Large staghorn calculus in lower pole calices-renal pelvis. Under ultrasound guidance, 18 gauge Chiba needle advanced to lower pole calyx containing part of the calculus. Remainder procedure performed under fluoroscopy guidance. Contrast injection confirmed needle position.  The needle was exchanged over stiff Glidewire for 4 [place]itanian Kumpe catheter. Wire and catheter manipulation performed to navigate up to the ureteropelvic junction. The angle of the renal pelvis calculus in ureter necessitated exchanged for a rim catheter. The wire would not progress beyond couple of cm proximal ureter. Contrast injection confirmed presence of a small calculus in the upper ureter. This was successfully navigated with an 014 wire and quick cross catheter. Subsequently this was exchanged for 6 [place]itanian sheath allowing for passage of 2 parallel 035 guidewires with distal ends in the urinary bladder. The sheath was exchanged for 2 4 [place]itanian Kumpe catheters with nephroureteral access. The catheters were capped, secured with sutures and covered with sterile dressing. Of note, the urine within the collecting system upon accessing with needle was purulent. Therefore, decision made to admit patient for overnight observation with scheduled lithotripsy on the subsequent day.       Ultrasound right kidney reveals severe hydronephrosis with large burden staghorn calculus. The aspirated urine after needle access was purulent.   Successful insertion of two small caliber catheters with nephroureteral access in preparation for scheduled lithotripsy.   Plan: Given the pyonephrosis, patient was admitted overnight for observation/intravenous antibiotics in expectation for lithotripsy scheduled on subsequent day.     Signed by: Steven Roy 5/28/2025 3:56 PM Dictation workstation:   [id number]    FL less than 1 hour  Result Date: 5/28/2025  These images are not reportable by radiology and will not be interpreted by  Radiologists.    Electrocardiogram, 12-lead PRN ACS symptoms  Result Date: 5/28/2025  Normal sinus rhythm Cannot rule out Anteroseptal infarct (cited on or before 02-JUN-2022) Abnormal ECG When compared with ECG of 28-MAY-2025 09:01, (unconfirmed) No significant change was found Confirmed by Torito Colmenares  ([id number]) on 5/28/2025 12:59:05 PM      Scheduled medications  Scheduled Medications[1]  Continuous medications  Continuous Medications[2]  PRN medications  PRN Medications[3]    ASSESSMENT:  Nephrolithiasis  Status post percutaneous nephrolithotomy with stent placement  Pyuria possible UTI  Leukocytosis  Difficulty voiding  BPH  Advanced prostate cancer  Hypertensive urgency   Hypertension  Tobacco dependence     PLAN:  Status post percutaneous nephrolithotomy with stent placement.  Postoperative date #1.  Postoperative care per Urology.  Urinalysis noted.  Urine culture pending.  IV antibiotics IV Levaquin.  Follow-up urine culture results.  Secure message to urology, Dr. Kelley.  Status post Connors catheter removal.  Status post void.  Monitor I's and O's.  Management per Urology.  Leukocytosis.  Afebrile.  Nontoxic-appearing.  Outpatient follow with PCP in 1 week.  Blood pressures reviewed.  Improved.  Stable.  Cardiology.  Input appreciated.  Outpatient follow-up with previously established cardiologist, Dr. Adame.  Continue amlodipine 10 mg p.o. daily.  Outpatient blood pressure monitoring daily.  Log values.  Outpatient follow-up with PCP.  Patient may follow-up with Dr. Dewitt outpatient in 1 week.  Discussed with patient and spouse.  Advised tobacco cessation.  Activity as tolerated.  Fall precautions.  Up with assistance.  DVT prophylaxis.  SCDs.  GI prophylaxis.  Supportive care.  Patient and spouse reassured.  Case management following for discharge planning.  Discharge plan home.  Discussed with Dr. Dewitt.  Okay to discharge per Urology.  Thank you for this consultation.       Rasheeda Pete, APRN-CNP         [1] amLODIPine, 10 mg, oral, Daily  docusate sodium, 100 mg, oral, BID  heparin (porcine), 5,000 Units, subcutaneous, q8h  levoFLOXacin, 750 mg, intravenous, q24h [facility]  [2]    [3] PRN medications: acetaminophen, morphine, naloxone, ondansetron **OR** ondansetron, oxyCODONE

## 2025-05-29 NOTE — CARE PLAN
The patient's goals for the shift include Eat and try to warm up    The clinical goals for the shift include manage pain

## 2025-05-30 DIAGNOSIS — N20.0 RENAL CALCULI: ICD-10-CM

## 2025-05-31 LAB
BACTERIA UR CULT: ABNORMAL

## 2025-06-01 LAB
APPEARANCE STONE: NORMAL
COMPN STONE: NORMAL
SPECIMEN WT: 7070 MG

## 2025-06-08 ENCOUNTER — APPOINTMENT (OUTPATIENT)
Dept: RADIOLOGY | Facility: HOSPITAL | Age: 70
End: 2025-06-08
Payer: COMMERCIAL

## 2025-06-08 ENCOUNTER — HOSPITAL ENCOUNTER (EMERGENCY)
Facility: HOSPITAL | Age: 70
Discharge: HOME | End: 2025-06-08
Attending: EMERGENCY MEDICINE
Payer: COMMERCIAL

## 2025-06-08 VITALS
SYSTOLIC BLOOD PRESSURE: 162 MMHG | HEIGHT: 74 IN | DIASTOLIC BLOOD PRESSURE: 80 MMHG | RESPIRATION RATE: 16 BRPM | BODY MASS INDEX: 16.68 KG/M2 | HEART RATE: 84 BPM | WEIGHT: 130 LBS | OXYGEN SATURATION: 94 % | TEMPERATURE: 97.5 F

## 2025-06-08 DIAGNOSIS — N20.1 URETEROLITHIASIS: ICD-10-CM

## 2025-06-08 DIAGNOSIS — R31.9 HEMATURIA, UNSPECIFIED TYPE: ICD-10-CM

## 2025-06-08 DIAGNOSIS — R10.9 FLANK PAIN: Primary | ICD-10-CM

## 2025-06-08 DIAGNOSIS — I10 DIASTOLIC HYPERTENSION: ICD-10-CM

## 2025-06-08 LAB
ALBUMIN SERPL BCP-MCNC: 3.8 G/DL (ref 3.4–5)
ALP SERPL-CCNC: 72 U/L (ref 33–136)
ALT SERPL W P-5'-P-CCNC: 15 U/L (ref 10–52)
ANION GAP SERPL CALCULATED.3IONS-SCNC: 11 MMOL/L (ref 10–20)
APPEARANCE UR: ABNORMAL
AST SERPL W P-5'-P-CCNC: 14 U/L (ref 9–39)
BASOPHILS # BLD AUTO: 0.11 X10*3/UL (ref 0–0.1)
BASOPHILS NFR BLD AUTO: 0.9 %
BILIRUB SERPL-MCNC: 0.4 MG/DL (ref 0–1.2)
BILIRUB UR STRIP.AUTO-MCNC: NEGATIVE MG/DL
BUN SERPL-MCNC: 20 MG/DL (ref 6–23)
CALCIUM SERPL-MCNC: 10.2 MG/DL (ref 8.6–10.3)
CHLORIDE SERPL-SCNC: 106 MMOL/L (ref 98–107)
CO2 SERPL-SCNC: 27 MMOL/L (ref 21–32)
COLOR UR: ABNORMAL
CREAT SERPL-MCNC: 0.94 MG/DL (ref 0.5–1.3)
EGFRCR SERPLBLD CKD-EPI 2021: 86 ML/MIN/1.73M*2
EOSINOPHIL # BLD AUTO: 0.23 X10*3/UL (ref 0–0.4)
EOSINOPHIL NFR BLD AUTO: 1.9 %
ERYTHROCYTE [DISTWIDTH] IN BLOOD BY AUTOMATED COUNT: 17.2 % (ref 11.5–14.5)
GLUCOSE SERPL-MCNC: 106 MG/DL (ref 74–99)
GLUCOSE UR STRIP.AUTO-MCNC: NORMAL MG/DL
HCT VFR BLD AUTO: 31.9 % (ref 41–52)
HGB BLD-MCNC: 10.3 G/DL (ref 13.5–17.5)
HOLD SPECIMEN: NORMAL
IMM GRANULOCYTES # BLD AUTO: 0.03 X10*3/UL (ref 0–0.5)
IMM GRANULOCYTES NFR BLD AUTO: 0.2 % (ref 0–0.9)
KETONES UR STRIP.AUTO-MCNC: NEGATIVE MG/DL
LEUKOCYTE ESTERASE UR QL STRIP.AUTO: ABNORMAL
LIPASE SERPL-CCNC: 11 U/L (ref 9–82)
LYMPHOCYTES # BLD AUTO: 2.23 X10*3/UL (ref 0.8–3)
LYMPHOCYTES NFR BLD AUTO: 18.2 %
MCH RBC QN AUTO: 24.2 PG (ref 26–34)
MCHC RBC AUTO-ENTMCNC: 32.3 G/DL (ref 32–36)
MCV RBC AUTO: 75 FL (ref 80–100)
MONOCYTES # BLD AUTO: 0.53 X10*3/UL (ref 0.05–0.8)
MONOCYTES NFR BLD AUTO: 4.3 %
NEUTROPHILS # BLD AUTO: 9.12 X10*3/UL (ref 1.6–5.5)
NEUTROPHILS NFR BLD AUTO: 74.5 %
NITRITE UR QL STRIP.AUTO: NEGATIVE
NRBC BLD-RTO: 0 /100 WBCS (ref 0–0)
PH UR STRIP.AUTO: 6 [PH]
PLATELET # BLD AUTO: 652 X10*3/UL (ref 150–450)
POTASSIUM SERPL-SCNC: 4.3 MMOL/L (ref 3.5–5.3)
PROT SERPL-MCNC: 7.7 G/DL (ref 6.4–8.2)
PROT UR STRIP.AUTO-MCNC: ABNORMAL MG/DL
RBC # BLD AUTO: 4.25 X10*6/UL (ref 4.5–5.9)
RBC # UR STRIP.AUTO: ABNORMAL MG/DL
RBC #/AREA URNS AUTO: >20 /HPF
SODIUM SERPL-SCNC: 140 MMOL/L (ref 136–145)
SP GR UR STRIP.AUTO: 1.02
UROBILINOGEN UR STRIP.AUTO-MCNC: NORMAL MG/DL
WBC # BLD AUTO: 12.3 X10*3/UL (ref 4.4–11.3)
WBC #/AREA URNS AUTO: ABNORMAL /HPF

## 2025-06-08 PROCEDURE — 36415 COLL VENOUS BLD VENIPUNCTURE: CPT | Performed by: EMERGENCY MEDICINE

## 2025-06-08 PROCEDURE — 2500000004 HC RX 250 GENERAL PHARMACY W/ HCPCS (ALT 636 FOR OP/ED): Performed by: EMERGENCY MEDICINE

## 2025-06-08 PROCEDURE — 96360 HYDRATION IV INFUSION INIT: CPT | Mod: 59

## 2025-06-08 PROCEDURE — 74176 CT ABD & PELVIS W/O CONTRAST: CPT

## 2025-06-08 PROCEDURE — 83690 ASSAY OF LIPASE: CPT | Performed by: EMERGENCY MEDICINE

## 2025-06-08 PROCEDURE — 85025 COMPLETE CBC W/AUTO DIFF WBC: CPT | Performed by: EMERGENCY MEDICINE

## 2025-06-08 PROCEDURE — 96361 HYDRATE IV INFUSION ADD-ON: CPT | Mod: 59

## 2025-06-08 PROCEDURE — 51702 INSERT TEMP BLADDER CATH: CPT

## 2025-06-08 PROCEDURE — 80053 COMPREHEN METABOLIC PANEL: CPT | Performed by: EMERGENCY MEDICINE

## 2025-06-08 PROCEDURE — 81003 URINALYSIS AUTO W/O SCOPE: CPT | Performed by: EMERGENCY MEDICINE

## 2025-06-08 PROCEDURE — 74176 CT ABD & PELVIS W/O CONTRAST: CPT | Performed by: STUDENT IN AN ORGANIZED HEALTH CARE EDUCATION/TRAINING PROGRAM

## 2025-06-08 PROCEDURE — 99284 EMERGENCY DEPT VISIT MOD MDM: CPT | Mod: 25 | Performed by: EMERGENCY MEDICINE

## 2025-06-08 PROCEDURE — 87086 URINE CULTURE/COLONY COUNT: CPT | Mod: WESLAB | Performed by: EMERGENCY MEDICINE

## 2025-06-08 PROCEDURE — 2500000001 HC RX 250 WO HCPCS SELF ADMINISTERED DRUGS (ALT 637 FOR MEDICARE OP): Performed by: EMERGENCY MEDICINE

## 2025-06-08 PROCEDURE — 51700 IRRIGATION OF BLADDER: CPT

## 2025-06-08 RX ORDER — ACETAMINOPHEN 325 MG/1
975 TABLET ORAL ONCE
Status: COMPLETED | OUTPATIENT
Start: 2025-06-08 | End: 2025-06-08

## 2025-06-08 RX ADMIN — ACETAMINOPHEN 975 MG: 325 TABLET ORAL at 11:06

## 2025-06-08 RX ADMIN — SODIUM CHLORIDE 1000 ML: 900 INJECTION, SOLUTION INTRAVENOUS at 11:08

## 2025-06-08 ASSESSMENT — PAIN DESCRIPTION - PAIN TYPE: TYPE: ACUTE PAIN

## 2025-06-08 ASSESSMENT — PAIN DESCRIPTION - DESCRIPTORS: DESCRIPTORS: DISCOMFORT

## 2025-06-08 ASSESSMENT — COLUMBIA-SUICIDE SEVERITY RATING SCALE - C-SSRS
6. HAVE YOU EVER DONE ANYTHING, STARTED TO DO ANYTHING, OR PREPARED TO DO ANYTHING TO END YOUR LIFE?: NO
1. IN THE PAST MONTH, HAVE YOU WISHED YOU WERE DEAD OR WISHED YOU COULD GO TO SLEEP AND NOT WAKE UP?: NO
2. HAVE YOU ACTUALLY HAD ANY THOUGHTS OF KILLING YOURSELF?: NO

## 2025-06-08 ASSESSMENT — LIFESTYLE VARIABLES
HAVE PEOPLE ANNOYED YOU BY CRITICIZING YOUR DRINKING: NO
TOTAL SCORE: 0
HAVE YOU EVER FELT YOU SHOULD CUT DOWN ON YOUR DRINKING: NO
EVER HAD A DRINK FIRST THING IN THE MORNING TO STEADY YOUR NERVES TO GET RID OF A HANGOVER: NO
EVER FELT BAD OR GUILTY ABOUT YOUR DRINKING: NO

## 2025-06-08 ASSESSMENT — PAIN SCALES - GENERAL: PAINLEVEL_OUTOF10: 7

## 2025-06-08 ASSESSMENT — PAIN - FUNCTIONAL ASSESSMENT: PAIN_FUNCTIONAL_ASSESSMENT: 0-10

## 2025-06-08 NOTE — ED TRIAGE NOTES
State that he had the Kidney Stone removed, does have Prostate CA. They had to place a daniel Cath, now present in ED with Blood in the Bag

## 2025-06-08 NOTE — PROGRESS NOTES
Attestation/Supervisory note for RADHA Centeno      The patient is a 70-year-old male presenting to the emergency department for evaluation of blood in his urinary catheter and difficulty having urine output because of it.  He states he also has some right-sided flank pain.  He states he was recently seen by his urologist, Dr. Willis, and had a stent placed because of obstruction with a urethral stone.  He states he also had some stones remaining in his bladder.  He states he started having increased pain and bleeding overnight.  He denies any headache or visual changes.  No chest pain or shortness of breath.  No urinary complaints other than listed above.  No urethral discharge.  No nausea, vomiting or diarrhea.  No fever or chills.  No palpitations.  He denies any abdominal pain but does have right-sided flank pain.  All pertinent positives and negatives are recorded above.  All other systems reviewed and otherwise negative.  Vital signs with mild diastolic hypertension but otherwise within normal limits.  Physical exam with a well-nourished well-developed male in no acute distress.  HEENT exam within normal limits.  He has no evidence of airway compromise or respiratory distress.  Abdominal exam is benign.  He does not have any focal midline neck or back pain with palpation.  No step-offs.  Strength is 5/5 in all 4 extremities.  Sensation is intact.  He is able to walk and stand without difficulty.  He is able to converse without difficulty.      Oral acetaminophen and IV fluids ordered.      Diagnostic labs with hematuria, proteinuria, borderline leukocytosis, mild anemia and thrombocytosis but otherwise unremarkable.        CT abdomen pelvis wo IV contrast   Final Result        Interval fragmentation of right staghorn calculus with few small   residual stone fragments remaining within the right renal collecting   system along with a large amount of hyperdensities suggestive of   clots. Right ureteral stent in place  without hydroureteronephrosis.        Stable left renal and bladder calculi.        MACRO:   None        Signed by: CharoElizOtilio Pegueroan 6/8/2025 11:24 AM   Dictation workstation:   [id number]           The patient does not have any evidence of hemodynamic instability.  He is well-perfused on exam.  No evidence of sepsis.  Nursing staff was able to place a Connors catheter with free flow of urine.  Diagnostic labs without significant abnormality.  There is interval fragmentation of the right staghorn calculus on CT imaging and a right ureteral stent in place without evidence of hydroureteronephrosis.  The patient did have improvement in his symptoms with medications given and with the Connors catheter.      The patient was released in good condition.  He will follow-up with his primary care physician within 1 to 2 days for further management of his current symptoms and repeat check of his blood pressure.  He was also instructed to follow-up with his urologist within 1 to 2 days for further management of his symptoms.  He will return to the emergency department sooner with worsening of symptoms or onset of new symptoms      Impression/diagnosis:  Right-sided flank pain  Ureterolithiasis  Diastolic hypertension  Hematuria  Leukocytosis, unspecified      I personally saw the patient and made/approve the management plan and take responsibility for the patient management.      I independently interpreted the following study (S) diagnostic labs      I personally discussed the patient's management with the patient      I reviewed the results of the diagnostic labs and diagnostic imaging.  Formal radiology read was completed by the radiologist.      Manisha Collazo MD

## 2025-06-08 NOTE — DISCHARGE INSTRUCTIONS
Follow-up with your primary care physician within 1 to 2 days for further management of your current symptoms and repeat check of your blood pressure.      Follow-up with urology within 1 to 2 days for further management of your flank pain, urinary symptoms and ureteral stones      Return to the emergency department sooner with worsening of symptoms or onset of new symptoms

## 2025-06-08 NOTE — ED PROVIDER NOTES
HPI   Chief Complaint   Patient presents with    Blood in Urine       HPI  This is a 70-year-old male presenting to the emergency department for evaluation of bloody urine.  On 5/28/2025, patient had nephrolithotomy with percutaneous stent placement on the right side performed by urology, Dr. Willis.  Patient states that he woke up this morning and noted blood clots in his urine.  Patient was concerned because he had the urge to urinate but had a Connors catheter in place so he knew that it was likely clogged.  He denies any abdominal pain or back pain.  No fevers or chills.  Patient states his urine has been looking well following procedure.  He does have a follow-up appoint with Dr. Willis on Friday of this week.    Please see HPI for pertinent positive and negative ROS.     Patient History   Medical History[1]  Surgical History[2]  Family History[3]  Social History[4]    Physical Exam   ED Triage Vitals [06/08/25 0952]   Temperature Heart Rate Respirations BP   36.4 °C (97.5 °F) 81 18 116/80      Pulse Ox Temp src Heart Rate Source Patient Position   96 % -- -- Sitting      BP Location FiO2 (%)     Right arm --       Physical Exam  GENERAL APPEARANCE: Awake and alert. No acute respiratory distress.   VITAL SIGNS: As per the nurses' triage record.  HEENT: Normocephalic, atraumatic.   NECK: Soft, nontender and supple  CHEST: Nontender to palpation. Clear to auscultation bilaterally. Symmetric rise and fall of chest wall.   HEART: Clear S1 and S2. Regular rate and rhythm. Strong and equal pulses in the extremities.  ABDOMEN: Soft, nontender, nondistended  MUSCULOSKELETAL: Full gross active range of motion. Ambulating on own with no acute difficulties  NEUROLOGICAL: Awake, alert and oriented x 3. Patient answering questions appropriately.   IMMUNOLOGICAL: No lymphatic streaking noted  DERMATOLOGIC: Warm and dry   PYSCH: Cooperative with appropriate mood and affect.    ED Course & MDM   Diagnoses as of 06/08/25 4001    Flank pain   Hematuria, unspecified type   Ureterolithiasis   Diastolic hypertension                 No data recorded                                 Medical Decision Making  Parts of this chart have been completed using voice recognition software. Please excuse any errors of transcription.  My thought process and reason for plan has been formulated from the time that I saw the patient until the time of disposition and is not specific to one specific moment during their visit and furthermore my MDM encompasses this entire chart and not only this text box.      HPI: Detailed above.    Exam: A medically appropriate exam performed, outlined above, given the known history and presentation.    History obtained from: Patient    Medications given during visit:  Medications   sodium chloride 0.9 % bolus 1,000 mL (1,000 mL intravenous New Bag 6/8/25 1108)   acetaminophen (Tylenol) tablet 975 mg (975 mg oral Given 6/8/25 1106)        Diagnostic/tests  Labs Reviewed   CBC WITH AUTO DIFFERENTIAL - Abnormal       Result Value    WBC 12.3 (*)     nRBC 0.0      RBC 4.25 (*)     Hemoglobin 10.3 (*)     Hematocrit 31.9 (*)     MCV 75 (*)     MCH 24.2 (*)     MCHC 32.3      RDW 17.2 (*)     Platelets 652 (*)     Neutrophils % 74.5      Immature Granulocytes %, Automated 0.2      Lymphocytes % 18.2      Monocytes % 4.3      Eosinophils % 1.9      Basophils % 0.9      Neutrophils Absolute 9.12 (*)     Immature Granulocytes Absolute, Automated 0.03      Lymphocytes Absolute 2.23      Monocytes Absolute 0.53      Eosinophils Absolute 0.23      Basophils Absolute 0.11 (*)    COMPREHENSIVE METABOLIC PANEL - Abnormal    Glucose 106 (*)     Sodium 140      Potassium 4.3      Chloride 106      Bicarbonate 27      Anion Gap 11      Urea Nitrogen 20      Creatinine 0.94      eGFR 86      Calcium 10.2      Albumin 3.8      Alkaline Phosphatase 72      Total Protein 7.7      AST 14      Bilirubin, Total 0.4      ALT 15     URINALYSIS WITH  REFLEX CULTURE AND MICROSCOPIC - Abnormal    Color, Urine Red (*)     Appearance, Urine Ex.Turbid (*)     Specific Gravity, Urine 1.016      pH, Urine 6.0      Protein, Urine 100 (2+) (*)     Glucose, Urine Normal      Blood, Urine OVER (3+) (*)     Ketones, Urine NEGATIVE      Bilirubin, Urine NEGATIVE      Urobilinogen, Urine Normal      Nitrite, Urine NEGATIVE      Leukocyte Esterase, Urine 75 Sehryl/uL (*)     Narrative:     OVER is reported when the result is greater than the clinically reportable range.   MICROSCOPIC ONLY, URINE - Abnormal    WBC, Urine NONE      RBC, Urine >20 (*)    LIPASE - Normal    Lipase 11      Narrative:     Venipuncture immediately after or during the administration of Metamizole may lead to falsely low results. Testing should be performed immediately prior to Metamizole dosing.   URINE CULTURE   URINALYSIS WITH REFLEX CULTURE AND MICROSCOPIC    Narrative:     The following orders were created for panel order Urinalysis with Reflex Culture and Microscopic.  Procedure                               Abnormality         Status                     ---------                               -----------         ------                     Urinalysis with Reflex C...[[id number]]  Abnormal            Final result               Extra Urine Gray Tube[[id number]]                            In process                   Please view results for these tests on the individual orders.   EXTRA URINE GRAY TUBE      CT abdomen pelvis wo IV contrast   Final Result        Interval fragmentation of right staghorn calculus with few small   residual stone fragments remaining within the right renal collecting   system along with a large amount of hyperdensities suggestive of   clots. Right ureteral stent in place without hydroureteronephrosis.        Stable left renal and bladder calculi.        MACRO:   None        Signed by: Michi Vaughn 6/8/2025 11:24 AM   Dictation workstation:   [id number]            Considerations/further MDM:  Patient was seen in conjucntion with my supervising physician,  Dr. Collazo. Please refer to her note.    This is a 70-year-old male presenting emergency room for hematuria.  CT abdomen pelvis IV contrast shows interval fragmentation of the right staghorn calculus with few small residual stone fragments remaining within the right renal collecting system along with large amount of hyperdensity suggestive of clots.  Right ureteral stent is in place without hydroureteronephrosis.  Labs show no evidence of urinary tract infection with bloody urine.  CBC shows a mild leukocytosis with a stable microcytic anemia.  CMP unremarkable with unremarkable renal function.  Connors catheter was placed and bladder irrigation performed by nursing staff.  Connors catheter draining well.  Patient was discharged with Connors catheter and educated to follow-up with his PCP and urology.  Return precautions were discussed.  Patient was discharged in stable condition in the company of his wife.      Procedure  Procedures       [1]   Past Medical History:  Diagnosis Date    Adverse effect of anesthesia     URINARY RETENTION    BPH (benign prostatic hyperplasia)     Hypertension     Personal history of irradiation     RT in 10/27/22, 28 fractions to prostate    Prostate cancer (Multi)    [2]   Past Surgical History:  Procedure Laterality Date    OTHER SURGICAL HISTORY  07/27/2022    Prostate needle biopsy   [3]   Family History  Problem Relation Name Age of Onset    Hypertension Other grandparent    [4]   Social History  Tobacco Use    Smoking status: Every Day     Average packs/day: 0.5 packs/day for 46.0 years (23.0 ttl pk-yrs)     Types: Cigars, Cigarettes     Start date: 1971    Smokeless tobacco: Never   Vaping Use    Vaping status: Never Used   Substance Use Topics    Alcohol use: Yes     Alcohol/week: 1.0 standard drink of alcohol     Types: 1 Cans of beer per week    Drug use: Yes     Types: Marijuana      Comment: SMOKE DAILY        Rosie Centeno PA-C  06/08/25 8048

## 2025-06-09 LAB — BACTERIA UR CULT: NORMAL

## 2025-06-11 DIAGNOSIS — C61 PROSTATE CANCER (MULTI): Primary | ICD-10-CM

## 2025-06-12 ENCOUNTER — TELEPHONE (OUTPATIENT)
Dept: UROLOGY | Facility: CLINIC | Age: 70
End: 2025-06-12
Payer: COMMERCIAL

## 2025-06-13 ENCOUNTER — APPOINTMENT (OUTPATIENT)
Dept: UROLOGY | Facility: CLINIC | Age: 70
End: 2025-06-13
Payer: COMMERCIAL

## 2025-06-13 VITALS
DIASTOLIC BLOOD PRESSURE: 79 MMHG | TEMPERATURE: 98.3 F | HEIGHT: 74 IN | BODY MASS INDEX: 16.2 KG/M2 | WEIGHT: 126.2 LBS | HEART RATE: 91 BPM | SYSTOLIC BLOOD PRESSURE: 153 MMHG

## 2025-06-13 DIAGNOSIS — N20.0 KIDNEY CALCULI: Primary | ICD-10-CM

## 2025-06-13 DIAGNOSIS — N21.0 BLADDER STONE: ICD-10-CM

## 2025-06-13 PROCEDURE — 99024 POSTOP FOLLOW-UP VISIT: CPT | Performed by: SURGERY

## 2025-06-13 ASSESSMENT — PAIN SCALES - GENERAL: PAINLEVEL_OUTOF10: 5

## 2025-06-13 NOTE — H&P (VIEW-ONLY)
Subjective   Patient ID: Paul Bruno is a 70 y.o. male who presents for Follow-up.    HPI  He is here for a postop visit.  He underwent a right PCNL for a large staghorn calculus several weeks ago.  He did well after surgery.  He did have 1 visit in the ER with hematuria.  He required bladder irrigation.  A follow-up CT scan was done.  Today he is doing well.  His hematuria resolved.  He also has known large bladder stones.                Objective   Physical Exam  Thin, well nourished  Abdomen soft, ND, NT  Connors w/ clear urine              Assessment/Plan   Problem List Items Addressed This Visit           ICD-10-CM       Genitourinary and Reproductive    Kidney calculi - Primary N20.0    Relevant Orders    Case Request Operating Room: LITHOTRIPSY, ESWL (Completed)    Bladder stone N21.0    Relevant Orders    Case Request Operating Room: LITHOTRIPSY, ESWL (Completed)        I reviewed his postop CT scan.  He has near 90% stone clearance within the right kidney.  There are only 1 or 2 fragments remaining.  The stent is in good position.  He has large bladder stones, and left renal stones.    I will schedule him for a right ESWL, and stent removal.  At the same setting we will laser ablate his bladder stones.            Sanket Willis MD 06/13/25 2:30 PM

## 2025-06-17 ENCOUNTER — CLINICAL SUPPORT (OUTPATIENT)
Dept: PREADMISSION TESTING | Facility: HOSPITAL | Age: 70
End: 2025-06-17
Payer: COMMERCIAL

## 2025-06-17 ASSESSMENT — ENCOUNTER SYMPTOMS
CONSTIPATION: 1
AGITATION: 0
VOMITING: 1
NAUSEA: 1
CHILLS: 0
FEVER: 0
SINUS CONGESTION: 0
SHORTNESS OF BREATH: 0
DIARRHEA: 0
COUGH: 0
NERVOUS/ANXIOUS: 0
RHINORRHEA: 0
CONFUSION: 0
WOUND: 0

## 2025-06-17 NOTE — PERIOPERATIVE NURSING NOTE
PAT PRE-OPERATIVE INSTRUCTIONS    [facility]  [address] [place] [address]  [phone]    Please let your surgeon know if:      You develop:  Open sores, shingles, burning or painful urination as these may increase your risk of an infection.   Fever=100.4 or greater   New or worsening cold or flu symptoms ( cough, shortness of breath, sore throat, respiratory distress, headache, fatigue, GI symptoms)   You no longer wish to have the surgery.   Any other personal circumstances change that may lead to the need to cancel or defer this surgery-such as being sick or getting admitted to any hospital within one week of your planned procedure.    Your contact details change, such as a change of address or phone number.    Starting now:     Please DO NOT drink alcohol or smoke for 24 hours before surgery. It is well known that quitting smoking can make a huge difference to your health and recovery from surgery. The longer you abstain from smoking, the better your chances of a healthy recovery. If you need help with quitting, call 1-800-QUIT-NOW to be connected to a trained counselor who will discuss the best methods to help you quit.     Before your surgery:    Please stop all supplements/ vitamins 7 days prior to surgery (or as directed by your surgeon).   Please stop taking NSAID pain medicine such as Advil, Ibuprofen, and Motrin 7 days before surgery.    If you develop any fever, cough, cold, rashes, cuts, scratches, scrapes, urinary symptoms or infection anywhere on your body (including teeth and gums) prior to surgery, please call your surgeon’s office as soon as possible. This may require treatment to reduce the chance of cancellation on the day of surgery.    The day before your surgery:   DIET- Do not eat any food after MIDNIGHT.   Get a good night’s rest.  Use the special soap for bathing if you have been instructed to use one.    Scheduled surgery times may change  and you will be notified if this occurs - please check your personal voicemail for any updates.     On the morning of surgery:   Wear comfortable, loose fitting clothes which open in the front.   Shower and please do not wear moisturizers, creams, lotions, deodorants, makeup or perfume.    Please bring with you to surgery:   Photo ID and insurance card   Current list of medicines and allergies   Pacemaker/ Defibrillator/Heart stent cards as well as remote controls for implanted devices    CPAP machine and mask    Slings/ splints/ crutches   A copy of your complete advanced directive/DHPOA.    Please do NOT bring with you to surgery:   All jewelry and valuables should be left at home.   Prosthetic devices such as contact lenses, glasses, hearing aids, dentures, eyelash extensions, hairpins and body piercings must be removed prior to going in to the surgical suite. If you have a case for these items, please bring it with you on surgery day.    *Patients under the age of 18: A responsible adult must be present and remaining in the building throughout the surgical visit.    After outpatient surgery:   A responsible adult MUST accompany you at the time of discharge and stay with you for 24 hours after your surgery. You may NOT drive yourself home after surgery.    Do not drive, operate machinery, make critical decisions or do activities that require co-ordination or balance until after a night’s sleep.   Do not drink alcoholic beverages for 24 hours.   Instructions for resuming your medications will be provided by your surgeon.    CALL YOUR DOCTOR AFTER SURGERY IF YOU HAVE:     Chills and/or a fever of 101° F or higher.    Redness, swelling, pus or drainage from your surgical wound or a bad smell from the wound.    Lightheadedness, fainting or confusion.    Persistent vomiting (throwing up) and are not able to eat or drink for 12 hours.    Three or more loose, watery bowel movements in 24 hours (diarrhea).   Difficulty  or pain while urinating( after non-urological surgery)    Pain and swelling in your legs, especially if it is only on one side.    Difficulty breathing or are breathing faster than normal.    Any new concerning symptoms.      Reviewed pre-op instructions with patient, states understanding and denies further questions at this time.      Take Care

## 2025-06-17 NOTE — CPM/PAT NURSE NOTE
CPM/PAT Nurse Note      Name: Paul Bruno (Paul Bruno)  /Age: 1955/70 y.o.       Medical History[1]    Surgical History[2]    Patient Sexual activity questions deferred to the physician.    Family History[3]    Allergies[4]    Prior to Admission medications    Medication Sig Start Date End Date Taking? Authorizing Provider   acetaminophen (Tylenol) 500 mg tablet Take 2 tablets (1,000 mg) by mouth every 8 hours if needed for mild pain (1 - 3) or moderate pain (4 - 6).   Yes Historical Provider, MD   amLODIPine (Norvasc) 10 mg tablet Take 1 tablet (10 mg) by mouth once daily.  Patient taking differently: Take 1 tablet (10 mg) by mouth once daily in the evening. 25  Yes JAKI Calles-CNP   ascorbic acid, vitamin C, 1,000 mg ER tablet Take 1 tablet (1,000 mg) by mouth once daily.   Yes Historical Provider, MD   aspirin-acetaminophen-caffeine (Excedrin Migraine) 250-250-65 mg tablet Take 2 tablets by mouth every 6 hours if needed for headaches or mild pain (1 - 3).   Yes Historical Provider, MD   cyanocobalamin (Vitamin B-12) 100 mcg tablet Take 1 tablet (100 mcg) by mouth once daily.   Yes Historical Provider, MD   ferrous sulfate 325 mg (65 mg elemental) tablet Take 1 tablet by mouth once daily with breakfast.   Yes Historical Provider, MD   multivitamin with minerals tablet Take 1 tablet by mouth once daily.   Yes Historical Provider, MD   oxyCODONE-acetaminophen (Percocet) 5-325 mg tablet Take 1 tablet by mouth every 6 hours if needed for severe pain (7 - 10). 25  Yes Sanket Willis MD   polyethylene glycol 3350 (MIRALAX ORAL) Take by mouth 3 times a day as needed. Gummy   Yes Historical Provider, MD   tamsulosin (Flomax) 0.4 mg 24 hr capsule Take 2 capsules (0.8 mg) by mouth once daily.  Patient taking differently: Take 2 capsules (0.8 mg) by mouth once daily in the morning. 25  Yes Skip Mehta MD   TURMERIC ORAL Take 1,000 mg by mouth once daily.   Yes Historical  Provider, MD   vitamin E acetate (VITAMIN E ORAL) Take 1 capsule by mouth once daily. vitamin E 180 mg oral capsule   Yes Historical Provider, MD HARRISON ROS:   Constitutional:    Pt reports fatigue   no fever   no chills  Neuro/Psych:    no agitation   no confusion   not nervous/anxious  Eyes:    no corrective lenses  Ears:    no hearing aides  Nose:    no nasal discharge   no sinus congestion   no epistaxis  Mouth:   Throat:    no throat pain  Neck:   Cardio:    no chest pain  Respiratory:    no cough   no shortness of breath  Endocrine:   GI:    Pt reports he has been constipated for the past few days until having a small BM last night; reports he had some nausea and vomiting after trying to eat something  yesterday due to the constipation    constipation   no diarrhea   nausea   vomiting  :    Pt reports he has a daniel catheter  Musculoskeletal:   Hematologic:   Skin:   no sores/wound   no rash      DASI Risk Score      Flowsheet Row Pre-Admission Testing from 5/22/2025 in [facility]   Can you take care of yourself (eat, dress, bathe, or use toilet)?  2.75 filed at 05/22/2025 1529   Can you walk indoors, such as around your house? 1.75 filed at 05/22/2025 1529   Can you walk a block or two on level ground?  2.75 filed at 05/22/2025 1529   Can you climb a flight of stairs or walk up a hill? 5.5 filed at 05/22/2025 1529   Can you run a short distance? 8 filed at 05/22/2025 1529   Can you do light work around the house like dusting or washing dishes? 2.7 filed at 05/22/2025 1529   Can you do moderate work around the house like vacuuming, sweeping floors or carrying groceries? 3.5 filed at 05/22/2025 1529   Can you do heavy work around the house like scrubbing floors or lifting and moving heavy furniture?  8 filed at 05/22/2025 1529   Can you do yard work like raking leaves, weeding or pushing a mower? 4.5 filed at 05/22/2025 1529   Can you have sexual relations? 5.25 filed at 05/22/2025 1529    Can you participate in moderate recreational activities like golf, bowling, dancing, doubles tennis or throwing a baseball or football? 6 filed at 05/22/2025 1529   Can you participate in strenous sports like swimming, singles tennis, football, basketball, or skiing? 0 filed at 05/22/2025 1529   DASI SCORE 50.7 filed at 05/22/2025 1529   METS Score (Will be calculated only when all the questions are answered) 9 filed at 05/22/2025 1529          Caprini DVT Assessment      Flowsheet Row Admission (Discharged) from Interventional Radiology  Nephroureteral Placement from 5/27/2025 in [facility] with Sanket Willis MD   DVT Score (IF A SCORE IS NOT CALCULATING, MUST SELECT A BMI TO COMPLETE) 6 filed at 05/27/2025 2028   Surgical Factors Major surgery planned, lasting 2-3 hours filed at 05/27/2025 2028   BMI (BMI MUST BE CHOSEN) 30 or less filed at 05/27/2025 2028          Modified Frailty Index    No data to display       [id number] Stroke Risk Points  Current as of just now        N/A 0 to 9 Points:      Last Change: N/A          The [id number] risk score (Lip GH, et al. 2009. © 2010 [employer]) quantifies the risk of stroke for a patient with atrial fibrillation. For patients without atrial fibrillation or under the age of 18 this score appears as N/A. Higher score values generally indicate higher risk of stroke.        This score is not applicable to this patient. Components are not calculated.          Revised Cardiac Risk Index      Flowsheet Row Pre-Admission Testing from 5/22/2025 in [facility]   High-Risk Surgery (Intraperitoneal, Intrathoracic,Suprainguinal vascular) 0 filed at 05/22/2025 1512   History of ischemic heart disease (History of MI, History of positive exercuse test, Current chest paint considered due to myocardial ischemia, Use of nitrate therapy, ECG with pathological Q Waves) 0 filed at 05/22/2025 1512   History of  congestive heart failure (pulmonary edemia, bilateral rales or S3 gallop, Paroxysmal nocturnal dyspnea, CXR showing pulmonary vascular redistribution) 0 filed at 05/22/2025 1512   History of cerebrovascular disease (Prior TIA or stroke) 0 filed at 05/22/2025 1512   Pre-operative insulin treatment 0 filed at 05/22/2025 1512   Pre-operative creatinine>2 mg/dl 0 filed at 05/22/2025 1512   Revised Cardiac Risk Calculator 0 filed at 05/22/2025 1512          Apfel Simplified Score    No data to display       Risk Analysis Index Results This Encounter    No data found in the last 10 encounters.       Stop Bang Score      Flowsheet Row Clinical Support from 6/17/2025 in [facility] Pre-Admission Testing from 5/22/2025 in [facility]   Do you snore loudly? 0 filed at 06/17/2025 0818 0 filed at 05/22/2025 1527   Do you often feel tired or fatigued after your sleep? 0 filed at 06/17/2025 0818 0 filed at 05/22/2025 1527   Has anyone ever observed you stop breathing in your sleep? 0 filed at 06/17/2025 0818 0 filed at 05/22/2025 1527   Do you have or are you being treated for high blood pressure? 1 filed at 06/17/2025 0818 0 filed at 05/22/2025 1527   Recent BMI (Calculated) 16.2 filed at 06/17/2025 0818 16.9 filed at 05/22/2025 1527   Is BMI greater than 35 kg/m2? 0=No filed at 06/17/2025 0818 0=No filed at 05/22/2025 1527   Age older than 50 years old? 1=Yes filed at 06/17/2025 0818 1=Yes filed at 05/22/2025 1527   Is your neck circumference greater than 17 inches (Male) or 16 inches (Female)? 0 filed at 06/17/2025 0818 0 filed at 05/22/2025 1527   Gender - Male 1=Yes filed at 06/17/2025 0818 1=Yes filed at 05/22/2025 1527   STOP-BANG Total Score 3 filed at 06/17/2025 0818 2 filed at 05/22/2025 1527          Prodigy: High Risk  Total Score: 23              Prodigy Age Score      Prodigy Gender Score     Prodigy Previous Opioid Use Score           ARISCAT Score for Postoperative Pulmonary  Complications    No data to display       Roberto Perioperative Risk for Myocardial Infarction or Cardiac Arrest (SANDRITA)    No data to display         Nurse Plan of Action:                [1]   Past Medical History:  Diagnosis Date    Adverse effect of anesthesia     URINARY RETENTION    BPH (benign prostatic hyperplasia)     Hypertension     Nephrolithiasis     Personal history of irradiation     RT in 10/27/22, 28 fractions to prostate    Prostate cancer (Multi)     Urinary tract infection    [2]   Past Surgical History:  Procedure Laterality Date    KIDNEY STONE SURGERY      OTHER SURGICAL HISTORY  07/27/2022    Prostate needle biopsy   [3]   Family History  Problem Relation Name Age of Onset    Hypertension Other grandparent    [4] No Known Allergies

## 2025-06-23 ENCOUNTER — APPOINTMENT (OUTPATIENT)
Dept: RADIOLOGY | Facility: HOSPITAL | Age: 70
End: 2025-06-23
Payer: COMMERCIAL

## 2025-06-23 ENCOUNTER — ANESTHESIA (OUTPATIENT)
Dept: OPERATING ROOM | Facility: HOSPITAL | Age: 70
End: 2025-06-23
Payer: COMMERCIAL

## 2025-06-23 ENCOUNTER — ANESTHESIA EVENT (OUTPATIENT)
Dept: OPERATING ROOM | Facility: HOSPITAL | Age: 70
End: 2025-06-23
Payer: COMMERCIAL

## 2025-06-23 ENCOUNTER — HOSPITAL ENCOUNTER (OUTPATIENT)
Facility: HOSPITAL | Age: 70
Setting detail: OUTPATIENT SURGERY
Discharge: HOME | End: 2025-06-23
Attending: SURGERY | Admitting: SURGERY
Payer: COMMERCIAL

## 2025-06-23 VITALS
HEART RATE: 72 BPM | OXYGEN SATURATION: 98 % | RESPIRATION RATE: 18 BRPM | TEMPERATURE: 97.5 F | WEIGHT: 119 LBS | SYSTOLIC BLOOD PRESSURE: 145 MMHG | BODY MASS INDEX: 15.28 KG/M2 | DIASTOLIC BLOOD PRESSURE: 89 MMHG

## 2025-06-23 DIAGNOSIS — N21.0 BLADDER STONE: ICD-10-CM

## 2025-06-23 DIAGNOSIS — N20.0 KIDNEY CALCULI: ICD-10-CM

## 2025-06-23 DIAGNOSIS — N20.0 RENAL CALCULI: Primary | ICD-10-CM

## 2025-06-23 PROCEDURE — 74018 RADEX ABDOMEN 1 VIEW: CPT

## 2025-06-23 PROCEDURE — 2500000001 HC RX 250 WO HCPCS SELF ADMINISTERED DRUGS (ALT 637 FOR MEDICARE OP): Performed by: ANESTHESIOLOGY

## 2025-06-23 PROCEDURE — 7100000002 HC RECOVERY ROOM TIME - EACH INCREMENTAL 1 MINUTE: Performed by: SURGERY

## 2025-06-23 PROCEDURE — 7100000001 HC RECOVERY ROOM TIME - INITIAL BASE CHARGE: Performed by: SURGERY

## 2025-06-23 PROCEDURE — 3600000003 HC OR TIME - INITIAL BASE CHARGE - PROCEDURE LEVEL THREE: Performed by: SURGERY

## 2025-06-23 PROCEDURE — 50590 FRAGMENTING OF KIDNEY STONE: CPT | Performed by: SURGERY

## 2025-06-23 PROCEDURE — 2500000004 HC RX 250 GENERAL PHARMACY W/ HCPCS (ALT 636 FOR OP/ED): Performed by: ANESTHESIOLOGIST ASSISTANT

## 2025-06-23 PROCEDURE — 7100000009 HC PHASE TWO TIME - INITIAL BASE CHARGE: Performed by: SURGERY

## 2025-06-23 PROCEDURE — 3700000001 HC GENERAL ANESTHESIA TIME - INITIAL BASE CHARGE: Performed by: SURGERY

## 2025-06-23 PROCEDURE — 2500000004 HC RX 250 GENERAL PHARMACY W/ HCPCS (ALT 636 FOR OP/ED): Performed by: SURGERY

## 2025-06-23 PROCEDURE — 74018 RADEX ABDOMEN 1 VIEW: CPT | Performed by: RADIOLOGY

## 2025-06-23 PROCEDURE — 99100 ANES PT EXTEME AGE<1 YR&>70: CPT | Performed by: ANESTHESIOLOGY

## 2025-06-23 PROCEDURE — 2720000007 HC OR 272 NO HCPCS: Performed by: SURGERY

## 2025-06-23 PROCEDURE — A50590 PR FRAGMENT KIDNEY STONE/ ESWL: Performed by: ANESTHESIOLOGIST ASSISTANT

## 2025-06-23 PROCEDURE — 52318 REMOVE BLADDER STONE: CPT | Performed by: SURGERY

## 2025-06-23 PROCEDURE — 52310 CYSTOSCOPY AND TREATMENT: CPT | Performed by: SURGERY

## 2025-06-23 PROCEDURE — A50590 PR FRAGMENT KIDNEY STONE/ ESWL: Performed by: ANESTHESIOLOGY

## 2025-06-23 PROCEDURE — 7100000010 HC PHASE TWO TIME - EACH INCREMENTAL 1 MINUTE: Performed by: SURGERY

## 2025-06-23 PROCEDURE — 3700000002 HC GENERAL ANESTHESIA TIME - EACH INCREMENTAL 1 MINUTE: Performed by: SURGERY

## 2025-06-23 PROCEDURE — 3600000008 HC OR TIME - EACH INCREMENTAL 1 MINUTE - PROCEDURE LEVEL THREE: Performed by: SURGERY

## 2025-06-23 RX ORDER — OXYCODONE AND ACETAMINOPHEN 5; 325 MG/1; MG/1
1 TABLET ORAL EVERY 6 HOURS PRN
Qty: 15 TABLET | Refills: 0 | Status: SHIPPED | OUTPATIENT
Start: 2025-06-23 | End: 2025-06-28

## 2025-06-23 RX ORDER — LIDOCAINE HYDROCHLORIDE 10 MG/ML
INJECTION, SOLUTION INFILTRATION; PERINEURAL AS NEEDED
Status: DISCONTINUED | OUTPATIENT
Start: 2025-06-23 | End: 2025-06-23

## 2025-06-23 RX ORDER — LIDOCAINE HYDROCHLORIDE 10 MG/ML
0.1 INJECTION, SOLUTION EPIDURAL; INFILTRATION; INTRACAUDAL; PERINEURAL ONCE
Status: DISCONTINUED | OUTPATIENT
Start: 2025-06-23 | End: 2025-06-23 | Stop reason: HOSPADM

## 2025-06-23 RX ORDER — ONDANSETRON HYDROCHLORIDE 2 MG/ML
INJECTION, SOLUTION INTRAVENOUS AS NEEDED
Status: DISCONTINUED | OUTPATIENT
Start: 2025-06-23 | End: 2025-06-23

## 2025-06-23 RX ORDER — SODIUM CHLORIDE, SODIUM LACTATE, POTASSIUM CHLORIDE, CALCIUM CHLORIDE 600; 310; 30; 20 MG/100ML; MG/100ML; MG/100ML; MG/100ML
100 INJECTION, SOLUTION INTRAVENOUS CONTINUOUS
Status: DISCONTINUED | OUTPATIENT
Start: 2025-06-23 | End: 2025-06-23 | Stop reason: HOSPADM

## 2025-06-23 RX ORDER — METOCLOPRAMIDE HYDROCHLORIDE 5 MG/ML
10 INJECTION INTRAMUSCULAR; INTRAVENOUS ONCE AS NEEDED
Status: DISCONTINUED | OUTPATIENT
Start: 2025-06-23 | End: 2025-06-23 | Stop reason: HOSPADM

## 2025-06-23 RX ORDER — PROPOFOL 10 MG/ML
INJECTION, EMULSION INTRAVENOUS AS NEEDED
Status: DISCONTINUED | OUTPATIENT
Start: 2025-06-23 | End: 2025-06-23

## 2025-06-23 RX ORDER — SODIUM CHLORIDE, SODIUM LACTATE, POTASSIUM CHLORIDE, CALCIUM CHLORIDE 600; 310; 30; 20 MG/100ML; MG/100ML; MG/100ML; MG/100ML
50 INJECTION, SOLUTION INTRAVENOUS CONTINUOUS
Status: DISCONTINUED | OUTPATIENT
Start: 2025-06-23 | End: 2025-06-23 | Stop reason: HOSPADM

## 2025-06-23 RX ORDER — ONDANSETRON HYDROCHLORIDE 2 MG/ML
4 INJECTION, SOLUTION INTRAVENOUS ONCE AS NEEDED
Status: DISCONTINUED | OUTPATIENT
Start: 2025-06-23 | End: 2025-06-23 | Stop reason: HOSPADM

## 2025-06-23 RX ORDER — FENTANYL CITRATE 50 UG/ML
50 INJECTION, SOLUTION INTRAMUSCULAR; INTRAVENOUS EVERY 5 MIN PRN
Status: DISCONTINUED | OUTPATIENT
Start: 2025-06-23 | End: 2025-06-23 | Stop reason: HOSPADM

## 2025-06-23 RX ORDER — OXYCODONE HYDROCHLORIDE 5 MG/1
5 TABLET ORAL EVERY 4 HOURS PRN
Status: DISCONTINUED | OUTPATIENT
Start: 2025-06-23 | End: 2025-06-23 | Stop reason: HOSPADM

## 2025-06-23 RX ORDER — FENTANYL CITRATE 50 UG/ML
INJECTION, SOLUTION INTRAMUSCULAR; INTRAVENOUS AS NEEDED
Status: DISCONTINUED | OUTPATIENT
Start: 2025-06-23 | End: 2025-06-23

## 2025-06-23 RX ORDER — HYDROMORPHONE HYDROCHLORIDE 0.2 MG/ML
0.2 INJECTION INTRAMUSCULAR; INTRAVENOUS; SUBCUTANEOUS EVERY 5 MIN PRN
Status: DISCONTINUED | OUTPATIENT
Start: 2025-06-23 | End: 2025-06-23 | Stop reason: HOSPADM

## 2025-06-23 RX ORDER — CEFAZOLIN SODIUM 2 G/100ML
2 INJECTION, SOLUTION INTRAVENOUS ONCE
Status: COMPLETED | OUTPATIENT
Start: 2025-06-23 | End: 2025-06-23

## 2025-06-23 RX ADMIN — ONDANSETRON 4 MG: 2 INJECTION, SOLUTION INTRAMUSCULAR; INTRAVENOUS at 12:22

## 2025-06-23 RX ADMIN — CEFAZOLIN SODIUM 2 G: 2 INJECTION, SOLUTION INTRAVENOUS at 11:54

## 2025-06-23 RX ADMIN — FENTANYL CITRATE 50 MCG: 50 INJECTION, SOLUTION INTRAMUSCULAR; INTRAVENOUS at 12:04

## 2025-06-23 RX ADMIN — PROPOFOL 120 MG: 10 INJECTION, EMULSION INTRAVENOUS at 11:52

## 2025-06-23 RX ADMIN — SODIUM CHLORIDE, POTASSIUM CHLORIDE, SODIUM LACTATE AND CALCIUM CHLORIDE: 600; 310; 30; 20 INJECTION, SOLUTION INTRAVENOUS at 11:44

## 2025-06-23 RX ADMIN — OXYCODONE HYDROCHLORIDE 5 MG: 5 TABLET ORAL at 13:04

## 2025-06-23 RX ADMIN — FENTANYL CITRATE 25 MCG: 50 INJECTION, SOLUTION INTRAMUSCULAR; INTRAVENOUS at 11:56

## 2025-06-23 RX ADMIN — LIDOCAINE HYDROCHLORIDE 50 MG: 10 INJECTION, SOLUTION INFILTRATION; PERINEURAL at 11:52

## 2025-06-23 SDOH — HEALTH STABILITY: MENTAL HEALTH: CURRENT SMOKER: 0

## 2025-06-23 ASSESSMENT — PAIN - FUNCTIONAL ASSESSMENT
PAIN_FUNCTIONAL_ASSESSMENT: 0-10

## 2025-06-23 ASSESSMENT — PAIN SCALES - GENERAL
PAINLEVEL_OUTOF10: 3
PAINLEVEL_OUTOF10: 0 - NO PAIN
PAINLEVEL_OUTOF10: 3
PAINLEVEL_OUTOF10: 0 - NO PAIN
PAINLEVEL_OUTOF10: 0 - NO PAIN
PAINLEVEL_OUTOF10: 2
PAINLEVEL_OUTOF10: 5 - MODERATE PAIN

## 2025-06-23 ASSESSMENT — COLUMBIA-SUICIDE SEVERITY RATING SCALE - C-SSRS
6. HAVE YOU EVER DONE ANYTHING, STARTED TO DO ANYTHING, OR PREPARED TO DO ANYTHING TO END YOUR LIFE?: NO
2. HAVE YOU ACTUALLY HAD ANY THOUGHTS OF KILLING YOURSELF?: NO
1. IN THE PAST MONTH, HAVE YOU WISHED YOU WERE DEAD OR WISHED YOU COULD GO TO SLEEP AND NOT WAKE UP?: NO

## 2025-06-23 ASSESSMENT — PAIN DESCRIPTION - ORIENTATION: ORIENTATION: RIGHT

## 2025-06-23 ASSESSMENT — PAIN DESCRIPTION - DESCRIPTORS
DESCRIPTORS: ACHING
DESCRIPTORS: SHARP;ACHING

## 2025-06-23 ASSESSMENT — PAIN DESCRIPTION - LOCATION: LOCATION: BACK

## 2025-06-23 NOTE — OP NOTE
LITHOTRIPSY, ESWL ( ESWL REP RASHAWN RYLEY - C - Arm, Holmium ) (R) Operative Note     Date: 2025  OR Location: [facility]    Name: Paul Bruno, : 1955, Age: 70 y.o., MRN: [id number], Sex: male    Diagnosis  Pre-op Diagnosis      * Kidney calculi [N20.0]     * Bladder stone [N21.0] Post-op Diagnosis     * Kidney calculi [N20.0]     * Bladder stone [N21.0]     Procedures  LITHOTRIPSY, ESWL ( ESWL REP RASHAWN RYLEY - C - Arm, Holmium )  70749 - MS LITHOTRIPSY XTRCORP SHOCK WAVE    LITHOTRIPSY, ESWL ( ESWL REP RASHAWN RYLEY - C - Arm, Holmium )  31290 - MS CYSTO W/SIMPLE REMOVAL STONE & STENT    LITHOTRIPSY, ESWL ( ESWL REP RASHAWN RYLEY - C - Arm, Holmium )  66341 - MS LITHOLAPAXY COMP/LG > 2.5 CM      Surgeons      * Sanket Willis - Primary    Resident/Fellow/Other Assistant:  Surgeons and Role:  * No surgeons found with a matching role *    Staff:   Circulator: Bria Baldwin Person: Rosi    Anesthesia Staff: Anesthesiologist: Bg Campoverde MD  C-AA: CÉSAR Turcios    Procedure Summary  Anesthesia: General  ASA: III  Estimated Blood Loss: 15 mL  Intra-op Medications:   Administrations occurring from 1140 to 1250 on 25:   Medication Name Total Dose   fentaNYL PF 0.05 mg/mL 75 mcg   lactated Ringer's infusion Cannot be calculated   lidocaine (Xylocaine) 1 % 50 mg   ondansetron (Zofran) 2 mg/mL injection 4 mg   propofol (Diprivan) injection 10 mg/mL 120 mg   ceFAZolin (Ancef) 2 g in dextrose (iso)  mL 2 g              Anesthesia Record               Intraprocedure I/O Totals          Intake    lactated Ringer's infusion 150.00 mL    ceFAZolin (Ancef) 2 g in dextrose (iso)  mL 100.00 mL    Total Intake 250 mL       Output    Est. Blood Loss 0 mL    Total Output 0 mL       Net    Net Volume 250 mL          Specimen:   ID Type Source Tests Collected by Time   A : BLADDER STONES Calculus Urethra CALCULI (STONE) ANALYSIS Sanket Willis MD 2025 1217                 Drains and/or  Catheters:   Urethral Catheter Double-lumen;Coude;Latex 18 Fr. (Active)       Tourniquet Times:         Implants:     Findings: 1. Residual right renal stone.                  2. Several large bladder stones.      Indications: Paul Bruno is an 70 y.o. male who is having surgery for Kidney calculi [N20.0]  Bladder stone [N21.0].     The patient was seen in the preoperative area. The risks, benefits, complications, treatment options, non-operative alternatives, expected recovery and outcomes were discussed with the patient. The possibilities of reaction to medication, pulmonary aspiration, injury to surrounding structures, bleeding, recurrent infection, the need for additional procedures, failure to diagnose a condition, and creating a complication requiring transfusion or operation were discussed with the patient. The patient concurred with the proposed plan, giving informed consent.  The site of surgery was properly noted/marked if necessary per policy. The patient has been actively warmed in preoperative area. Preoperative antibiotics have been ordered and given within 1 hours of incision. Venous thrombosis prophylaxis have been ordered including bilateral sequential compression devices    Procedure Details: The patient was brought to the operating room table.  General anesthesia was induced.  The patient was placed in the dorsal lithotomy position.  We introduced intraoperative fluoroscopy in order to visualize the stone.  We could see the stone projected over the region of the right kidney.  We next brought in our lithotripter and we targeted the stone.  We began our shockwave lithotripsy.  We started at a low power setting and applied 500 shocks at a rate of 90 shocks per minute.  We then had a brief 3-minute pause.  We then resumed our lithotripsy.  We increased our power and applied an additional 2500 shocks at a rate of 120 shocks per minute.  Using fluoroscopy we visualized good fragmentation of the  stone.  We applied a total of 3000 shocks.  At this point we stopped our lithotripsy.  We next prepped and draped the patient's genitalia.  We introduced the cystoscope per urethra and we entered the bladder.  We visualized the indwelling right ureteral stent.  Using a grasper the stent was completely removed.  We next inspected the bladder mucosa.  There were diffuse trabeculations noted.  No tumors were seen.  Along the floor of the bladder there were 3 large bladder stones.  We next introduced a 1000 µm laser fiber.  We turned our attention to the stones and began our laser lithotripsy.  We used a power setting of 20 to 30 W.  We were able to fragment all the large stones.  The stones fragmented quite well.  We next obtained the Ellik and irrigated out the stone chips.  We continued our stone fragmentation until we had complete clearance of the stones.  At this point we then inspected the bladder mucosa.  There was no evidence of any residual calculi.  We removed our scope.  At the end we placed a Connors catheter per urethra and we drained the bladder.  The patient was awakened and brought to the recovery room in stable condition.  Evidence of Infection: No   Complications:  None; patient tolerated the procedure well.    Disposition: PACU - hemodynamically stable.  Condition: stable                 Additional Details:     Attending Attestation: I was present and scrubbed for the entire procedure.    Sanket Willis  Phone Number: [phone]

## 2025-06-23 NOTE — ANESTHESIA PROCEDURE NOTES
Airway  Date/Time: 6/23/2025 11:53 AM  Reason: elective      Staffing  Performed: CÉSAR   Authorized by: Bg Campoverde MD    Performed by: CÉSAR Turcios  Patient location during procedure: OR    Patient Condition  Indications for airway management: anesthesia    Final Airway Details   Preoxygenated: yes  Final airway type: supraglottic airway  Successful airway: classic  Size: 4  Number of attempts at approach: 1

## 2025-06-23 NOTE — PERIOPERATIVE NURSING NOTE
Patient in Phase 2; dressed and up to chair with RN assist. Tolerating po fluids, no complaint of pain and no complaint of nausea.     Family at bedside; discussed discharge instructions with patient and Family. All questions at this time answered.     Discharge instructions provided using teachback method.  Patient's health-related risk factors discussed with patient.  Patient educated to look for worsening signs and symptoms and educated to seek medical attention if experiencing medical emergency.  Patient aware of needs to follow up with outpatient clinics as scheduled. Home going meds reviewed with patient.  Patient verbalized understanding of disposition and discharge instructions.  All questions answered to patient's satisfaction and within nursing scope of practice.    Patient clinically appropriate for discharge. Vitals stable/baseline.IV removed and patient transported to discharge area via wheelchair.

## 2025-06-23 NOTE — ANESTHESIA PREPROCEDURE EVALUATION
Patient: Paul Bruno    Procedure Information       Date/Time: 06/23/25 1140    Procedure: LITHOTRIPSY, ESWL ( ESWL REP RASHAWN HEDRICK - C - Arm, Holmium ) (Right)    Location: [facility] 01 / [facility]    Surgeons: Sanket Willis MD            Relevant Problems   Anesthesia (within normal limits)      Cardiac   (+) Atrial flutter (Multi)   (+) Benign essential hypertension   (+) Hypertension      Pulmonary (within normal limits)      Neuro (within normal limits)      GI   (+) Rectal bleeding      /Renal   (+) BPH (benign prostatic hyperplasia)   (+) Kidney calculi   (+) Prostate cancer (Multi)   (+) Renal calculi      Liver (within normal limits)      Endocrine (within normal limits)      Hematology (within normal limits)      Musculoskeletal (within normal limits)      HEENT (within normal limits)      ID (within normal limits)      Skin (within normal limits)       Clinical information reviewed:   Tobacco  Allergies  Meds   Med Hx  Surg Hx   Fam Hx  Soc Hx        NPO Detail:  NPO/Void Status  Date of Last Liquid: 06/22/25  Time of Last Liquid: 1900  Date of Last Solid: 06/22/25  Time of Last Solid: 2100  Last Intake Type: Clear fluids  Time of Last Void: 1110         Physical Exam    Airway  Mallampati: I  TM distance: >3 FB  Neck ROM: full  Mouth opening: 3 or more finger widths     Cardiovascular - normal exam   Dental     (+) upper dentures, lower dentures     Pulmonary - normal exam   Abdominal - normal exam           Anesthesia Plan    History of general anesthesia?: yes  History of complications of general anesthesia?: no    ASA 3     general     The patient is not a current smoker.    intravenous induction   Anesthetic plan and risks discussed with patient.

## 2025-06-23 NOTE — ANESTHESIA POSTPROCEDURE EVALUATION
Patient: Paul Bruno    Procedure Summary       Date: 06/23/25 Room / Location: [facility] / [facility]    Anesthesia Start: 1144 Anesthesia Stop: 1240    Procedure: LITHOTRIPSY, ESWL ( ESWL REP RASHAWN HEDRICK - C - Arm, Holmium ) (Right) Diagnosis:       Kidney calculi      Bladder stone      (Kidney calculi [N20.0])      (Bladder stone [N21.0])    Surgeons: Sanket Willis MD Responsible Provider: Bg Campoverde MD    Anesthesia Type: general ASA Status: 3            Anesthesia Type: general    Vitals Value Taken Time   /83 06/23/25 13:12   Temp 36 °C (96.8 °F) 06/23/25 13:00   Pulse 81 06/23/25 13:00   Resp 17 06/23/25 13:00   SpO2 97 % 06/23/25 13:00       Anesthesia Post Evaluation    Patient location during evaluation: bedside  Patient participation: complete - patient participated  Level of consciousness: awake and alert  Pain management: adequate  Airway patency: patent  Cardiovascular status: acceptable  Respiratory status: acceptable  Hydration status: acceptable  Postoperative Nausea and Vomiting: none        There were no known notable events for this encounter.

## 2025-06-24 ASSESSMENT — PAIN SCALES - GENERAL: PAINLEVEL_OUTOF10: 3

## 2025-06-26 NOTE — PROGRESS NOTES
Oncology Follow up Note      Cancer History  Prostate Cancer - locally advanced / clinical Stage DARY - lost to f/up concerning for met prostate cancer  EPE/christa mets  Treatment  -UTI , retention, cysto neg, PSA 40.78 3/22 - MRI prostate, bone scan   - -chronic daniel in place  -PET Gallium on study -large mass in the prostate gland with extension into both seminal vesicles avid pelvic lymph node metastases perirectal, right external iliac bilateral common iliac increased expression and aortocaval lymph node  4/18/2022: started bicalutamide 30 days  4/25/2022: Biopsy reveals GG5 prostate cancer, GS 9  4/26/2022: Started ADT .  Seen by Rad onc , refused yovana/pred  -Started XRT end 9/2022 prostate and pelvis  -non compliant lost to follow up, and our clinic, last ADT 3/23  -rising PSA/ progression with mets to lung, initial PSA response and then rising January 2025  -7/10/24 ADT restarted , refused NHT, last ADT Eligard 4/4/25      Provider Team  Skip Mehta MD   No Assigned PCP Generic Provider, MD Iron Fletcher  PCP Soren Adame - Cardiology   Mark Shaw / Sanket Barrett - rad onc    Other contributing history  microcytosis, Aflutter, urinary incontinence, uncontrolled HTN, ongoing issues with catheter / urinary tract issues , no longer following up with urology and no daniel placement, intermittent catheter / 5/29/25 IR ureteral catheters, right perc nephrolithotomy, lithothripsy 6/23/25    Interval history:      ROS:  10-pt ROS reviewed and negative except as mentioned above.    Medications: below was reviewed and is accurate  Current Outpatient Medications   Medication Instructions    amLODIPine (NORVASC) 10 mg, oral, Daily    ascorbic acid, vitamin C, 1,000 mg ER tablet 1 tablet, Daily    aspirin-acetaminophen-caffeine (Excedrin Migraine) 250-250-65 mg tablet 2 tablets, Every 6 hours PRN    cyanocobalamin (VITAMIN B-12) 100 mcg, Daily    ferrous sulfate 325 mg (65  mg elemental) tablet 325 mg of ferrous sulfate, Daily with breakfast    multivitamin with minerals tablet 1 tablet, Daily    oxyCODONE-acetaminophen (Percocet) 5-325 mg tablet 1 tablet, oral, Every 6 hours PRN    oxyCODONE-acetaminophen (Percocet) 5-325 mg tablet 1 tablet, oral, Every 6 hours PRN    polyethylene glycol 3350 (MIRALAX ORAL) 3 times daily PRN    tamsulosin (FLOMAX) 0.8 mg, oral, Daily    TURMERIC ORAL 1,000 mg, Daily    vitamin E acetate (VITAMIN E ORAL) 1 capsule, Daily        Detailed family history and social history reviewed in detail and updated per epic charting and in detail with the patient    Physical exam:  There were no vitals filed for this visit.          GEN: NAD, well-appearing  SKIN: no concerning new lesions examined in upper / lower extremity   LUNGS: CTA  CV: RRR no clear R/G  ABD: Soft, NTND. No rebound or guarding.  EXT:  trace edema bilaterally   NEURO: Grossly intact. No focal deficits.  PSYCH: Normal mood and affect        Radiology review  Reviewed in detail personally and for detail see results in EPIC  PET imagine - 6/17/24 - focal increase in prostate gland, c/w recurrent disease, lung nodules SUV 8/ 4.7, 3.7, increased update in the penis, increased uptake within the penis , mets implant vs urinary retention  3/2025 - bilateral pulm nodules, 1.9 cm, c/w prior PET PSMA, enlarged prostate, bilateral staghorn renal calculi mild right hydro, 2.7 cm right, 1.8 cm left  CT a/p 6/8/25      Genomics Data    Laboratory review  Reviewed in detail personally and for detail see results in EPIC  Lab Results   Component Value Date    WBC 12.3 (H) 06/08/2025    HGB 10.3 (L) 06/08/2025    HCT 31.9 (L) 06/08/2025    MCV 75 (L) 06/08/2025     (H) 06/08/2025     Lab Results   Component Value Date    GLUCOSE 106 (H) 06/08/2025    CALCIUM 10.2 06/08/2025     06/08/2025    K 4.3 06/08/2025    CO2 27 06/08/2025     06/08/2025    BUN 20 06/08/2025    CREATININE 0.94 06/08/2025      Lab Results   Component Value Date    PSA 7.65 (H) 04/28/2025    PSA 4.61 (H) 03/14/2025    PSA 3.03 12/30/2024     Lab Results   Component Value Date    ALT 15 06/08/2025    AST 14 06/08/2025    ALKPHOS 72 06/08/2025    BILITOT 0.4 06/08/2025     Lab Results   Component Value Date    TESTOSTERONE <30 (L) 04/28/2025       ASSESSMENT AND PLAN     Prostate Cancer -next ADT due now, see prior notes, discussed DEXA/Genomics, refusing intervention,     LUTS / Staghorn calculi-s/p procedure with     Microcytosis  and likely reactive Thrombocytosis       discussed need while on ADT  maintain adequate cardiovascular health, exercise, dietary modifications,  bone health with calcium 1000 mg with vitamin D 400-800 international units      Skip Mehta MD  Senior Attending Physician/  in Medicine [facility] [employer]  [facility]  Patient line [phone]  Fax [phone]

## 2025-06-27 ENCOUNTER — APPOINTMENT (OUTPATIENT)
Dept: HEMATOLOGY/ONCOLOGY | Facility: HOSPITAL | Age: 70
End: 2025-06-27
Payer: COMMERCIAL

## 2025-06-27 ENCOUNTER — DOCUMENTATION (OUTPATIENT)
Dept: UROLOGY | Facility: CLINIC | Age: 70
End: 2025-06-27
Payer: COMMERCIAL

## 2025-06-27 NOTE — PROGRESS NOTES
Pt report his is having  something that looks like pus  in his daniel catheter  , chills, dizziness, sweating at night ,  burning sensation low bladder area , not able to walk well with weakness. Pt states he has been going through a lot with his health recovery from cancer , surgery and infections .   Dr Willis alerted . Symptoms happened previously, pt is ok as long as the catheter is draining.  If he gets worse over the weekend, then go to ER.  Pt notified  and understands POC

## 2025-07-07 ENCOUNTER — HOSPITAL ENCOUNTER (INPATIENT)
Facility: HOSPITAL | Age: 70
LOS: 4 days | Discharge: HOME | DRG: 659 | End: 2025-07-11
Attending: INTERNAL MEDICINE | Admitting: INTERNAL MEDICINE
Payer: COMMERCIAL

## 2025-07-07 ENCOUNTER — APPOINTMENT (OUTPATIENT)
Dept: RADIOLOGY | Facility: HOSPITAL | Age: 70
DRG: 659 | End: 2025-07-07
Payer: COMMERCIAL

## 2025-07-07 DIAGNOSIS — N20.0 RENAL CALCULI: ICD-10-CM

## 2025-07-07 DIAGNOSIS — I71.40 ABDOMINAL AORTIC ANEURYSM (AAA) WITHOUT RUPTURE, UNSPECIFIED PART: ICD-10-CM

## 2025-07-07 DIAGNOSIS — R33.9 URINARY RETENTION WITH INCOMPLETE BLADDER EMPTYING: ICD-10-CM

## 2025-07-07 DIAGNOSIS — N20.1 BILATERAL URETERAL CALCULI: ICD-10-CM

## 2025-07-07 DIAGNOSIS — N12 PYELONEPHRITIS: Primary | ICD-10-CM

## 2025-07-07 DIAGNOSIS — R09.02 HYPOXIA: ICD-10-CM

## 2025-07-07 LAB
ANION GAP SERPL CALC-SCNC: 14 MMOL/L (ref 10–20)
BASOPHILS # BLD AUTO: 0.08 X10*3/UL (ref 0–0.1)
BASOPHILS NFR BLD AUTO: 0.4 %
BNP SERPL-MCNC: 64 PG/ML (ref 0–99)
BUN SERPL-MCNC: 15 MG/DL (ref 6–23)
CALCIUM SERPL-MCNC: 9.9 MG/DL (ref 8.6–10.3)
CARDIAC TROPONIN I PNL SERPL HS: 6 NG/L (ref 0–20)
CHLORIDE SERPL-SCNC: 103 MMOL/L (ref 98–107)
CO2 SERPL-SCNC: 24 MMOL/L (ref 21–32)
CREAT SERPL-MCNC: 1.43 MG/DL (ref 0.5–1.3)
EGFRCR SERPLBLD CKD-EPI 2021: 52 ML/MIN/1.73M*2
EOSINOPHIL # BLD AUTO: 0.09 X10*3/UL (ref 0–0.4)
EOSINOPHIL NFR BLD AUTO: 0.4 %
ERYTHROCYTE [DISTWIDTH] IN BLOOD BY AUTOMATED COUNT: 18.6 % (ref 11.5–14.5)
GLUCOSE SERPL-MCNC: 102 MG/DL (ref 74–99)
HCT VFR BLD AUTO: 32.4 % (ref 41–52)
HGB BLD-MCNC: 10.5 G/DL (ref 13.5–17.5)
IMM GRANULOCYTES # BLD AUTO: 0.12 X10*3/UL (ref 0–0.5)
IMM GRANULOCYTES NFR BLD AUTO: 0.6 % (ref 0–0.9)
LYMPHOCYTES # BLD AUTO: 1.77 X10*3/UL (ref 0.8–3)
LYMPHOCYTES NFR BLD AUTO: 8.5 %
MCH RBC QN AUTO: 24.1 PG (ref 26–34)
MCHC RBC AUTO-ENTMCNC: 32.4 G/DL (ref 32–36)
MCV RBC AUTO: 74 FL (ref 80–100)
MONOCYTES # BLD AUTO: 0.76 X10*3/UL (ref 0.05–0.8)
MONOCYTES NFR BLD AUTO: 3.6 %
NEUTROPHILS # BLD AUTO: 18.02 X10*3/UL (ref 1.6–5.5)
NEUTROPHILS NFR BLD AUTO: 86.5 %
NRBC BLD-RTO: 0 /100 WBCS (ref 0–0)
PLATELET # BLD AUTO: 1062 X10*3/UL (ref 150–450)
POTASSIUM SERPL-SCNC: 4.7 MMOL/L (ref 3.5–5.3)
RBC # BLD AUTO: 4.36 X10*6/UL (ref 4.5–5.9)
SODIUM SERPL-SCNC: 136 MMOL/L (ref 136–145)
WBC # BLD AUTO: 20.8 X10*3/UL (ref 4.4–11.3)

## 2025-07-07 PROCEDURE — 36415 COLL VENOUS BLD VENIPUNCTURE: CPT | Performed by: NURSE PRACTITIONER

## 2025-07-07 PROCEDURE — 2500000004 HC RX 250 GENERAL PHARMACY W/ HCPCS (ALT 636 FOR OP/ED): Performed by: INTERNAL MEDICINE

## 2025-07-07 PROCEDURE — 84484 ASSAY OF TROPONIN QUANT: CPT | Performed by: INTERNAL MEDICINE

## 2025-07-07 PROCEDURE — 83880 ASSAY OF NATRIURETIC PEPTIDE: CPT | Performed by: INTERNAL MEDICINE

## 2025-07-07 PROCEDURE — 85025 COMPLETE CBC W/AUTO DIFF WBC: CPT | Performed by: NURSE PRACTITIONER

## 2025-07-07 PROCEDURE — 2500000001 HC RX 250 WO HCPCS SELF ADMINISTERED DRUGS (ALT 637 FOR MEDICARE OP)

## 2025-07-07 PROCEDURE — 96375 TX/PRO/DX INJ NEW DRUG ADDON: CPT

## 2025-07-07 PROCEDURE — 87077 CULTURE AEROBIC IDENTIFY: CPT | Mod: AHULAB | Performed by: INTERNAL MEDICINE

## 2025-07-07 PROCEDURE — 96374 THER/PROPH/DIAG INJ IV PUSH: CPT

## 2025-07-07 PROCEDURE — 74176 CT ABD & PELVIS W/O CONTRAST: CPT

## 2025-07-07 PROCEDURE — 1200000002 HC GENERAL ROOM WITH TELEMETRY DAILY

## 2025-07-07 PROCEDURE — 87154 CUL TYP ID BLD PTHGN 6+ TRGT: CPT | Mod: AHULAB | Performed by: INTERNAL MEDICINE

## 2025-07-07 PROCEDURE — 80048 BASIC METABOLIC PNL TOTAL CA: CPT | Performed by: NURSE PRACTITIONER

## 2025-07-07 PROCEDURE — 74176 CT ABD & PELVIS W/O CONTRAST: CPT | Performed by: RADIOLOGY

## 2025-07-07 PROCEDURE — 99222 1ST HOSP IP/OBS MODERATE 55: CPT

## 2025-07-07 PROCEDURE — 71046 X-RAY EXAM CHEST 2 VIEWS: CPT

## 2025-07-07 PROCEDURE — 2500000004 HC RX 250 GENERAL PHARMACY W/ HCPCS (ALT 636 FOR OP/ED)

## 2025-07-07 PROCEDURE — 36415 COLL VENOUS BLD VENIPUNCTURE: CPT | Performed by: INTERNAL MEDICINE

## 2025-07-07 PROCEDURE — 71046 X-RAY EXAM CHEST 2 VIEWS: CPT | Performed by: STUDENT IN AN ORGANIZED HEALTH CARE EDUCATION/TRAINING PROGRAM

## 2025-07-07 PROCEDURE — 99285 EMERGENCY DEPT VISIT HI MDM: CPT | Mod: 25 | Performed by: INTERNAL MEDICINE

## 2025-07-07 RX ORDER — TALC
6 POWDER (GRAM) TOPICAL NIGHTLY PRN
Status: DISCONTINUED | OUTPATIENT
Start: 2025-07-07 | End: 2025-07-11 | Stop reason: HOSPADM

## 2025-07-07 RX ORDER — POLYETHYLENE GLYCOL 3350 17 G/17G
17 POWDER, FOR SOLUTION ORAL DAILY PRN
Status: DISCONTINUED | OUTPATIENT
Start: 2025-07-07 | End: 2025-07-11 | Stop reason: HOSPADM

## 2025-07-07 RX ORDER — PANTOPRAZOLE SODIUM 40 MG/1
40 TABLET, DELAYED RELEASE ORAL
Status: DISCONTINUED | OUTPATIENT
Start: 2025-07-08 | End: 2025-07-11 | Stop reason: HOSPADM

## 2025-07-07 RX ORDER — AMLODIPINE BESYLATE 10 MG/1
10 TABLET ORAL EVERY EVENING
Status: DISCONTINUED | OUTPATIENT
Start: 2025-07-07 | End: 2025-07-11 | Stop reason: HOSPADM

## 2025-07-07 RX ORDER — PANTOPRAZOLE SODIUM 40 MG/10ML
40 INJECTION, POWDER, LYOPHILIZED, FOR SOLUTION INTRAVENOUS
Status: DISCONTINUED | OUTPATIENT
Start: 2025-07-08 | End: 2025-07-10

## 2025-07-07 RX ORDER — GUAIFENESIN/DEXTROMETHORPHAN 100-10MG/5
5 SYRUP ORAL EVERY 4 HOURS PRN
Status: DISCONTINUED | OUTPATIENT
Start: 2025-07-07 | End: 2025-07-11 | Stop reason: HOSPADM

## 2025-07-07 RX ORDER — ACETAMINOPHEN 325 MG/1
650 TABLET ORAL EVERY 4 HOURS PRN
Status: DISCONTINUED | OUTPATIENT
Start: 2025-07-07 | End: 2025-07-08

## 2025-07-07 RX ORDER — MORPHINE SULFATE 4 MG/ML
4 INJECTION, SOLUTION INTRAMUSCULAR; INTRAVENOUS EVERY 4 HOURS PRN
Status: DISCONTINUED | OUTPATIENT
Start: 2025-07-07 | End: 2025-07-11 | Stop reason: HOSPADM

## 2025-07-07 RX ORDER — ENOXAPARIN SODIUM 100 MG/ML
40 INJECTION SUBCUTANEOUS EVERY 24 HOURS
Status: DISCONTINUED | OUTPATIENT
Start: 2025-07-08 | End: 2025-07-09

## 2025-07-07 RX ORDER — ONDANSETRON HYDROCHLORIDE 2 MG/ML
4 INJECTION, SOLUTION INTRAVENOUS EVERY 6 HOURS PRN
Status: DISCONTINUED | OUTPATIENT
Start: 2025-07-07 | End: 2025-07-11 | Stop reason: HOSPADM

## 2025-07-07 RX ORDER — ONDANSETRON HYDROCHLORIDE 2 MG/ML
4 INJECTION, SOLUTION INTRAVENOUS ONCE
Status: COMPLETED | OUTPATIENT
Start: 2025-07-07 | End: 2025-07-07

## 2025-07-07 RX ORDER — CEFTRIAXONE 1 G/50ML
1 INJECTION, SOLUTION INTRAVENOUS EVERY 24 HOURS
Status: DISCONTINUED | OUTPATIENT
Start: 2025-07-08 | End: 2025-07-08

## 2025-07-07 RX ORDER — HYDROMORPHONE HYDROCHLORIDE 1 MG/ML
0.6 INJECTION, SOLUTION INTRAMUSCULAR; INTRAVENOUS; SUBCUTANEOUS
Status: DISCONTINUED | OUTPATIENT
Start: 2025-07-07 | End: 2025-07-11 | Stop reason: HOSPADM

## 2025-07-07 RX ORDER — MORPHINE SULFATE 2 MG/ML
2 INJECTION, SOLUTION INTRAMUSCULAR; INTRAVENOUS EVERY 4 HOURS PRN
Status: DISCONTINUED | OUTPATIENT
Start: 2025-07-07 | End: 2025-07-11 | Stop reason: HOSPADM

## 2025-07-07 RX ORDER — CEFTRIAXONE 1 G/50ML
1 INJECTION, SOLUTION INTRAVENOUS ONCE
Status: COMPLETED | OUTPATIENT
Start: 2025-07-07 | End: 2025-07-08

## 2025-07-07 RX ORDER — FERROUS SULFATE 325(65) MG
1 TABLET ORAL
Status: DISCONTINUED | OUTPATIENT
Start: 2025-07-08 | End: 2025-07-11 | Stop reason: HOSPADM

## 2025-07-07 RX ORDER — MORPHINE SULFATE 4 MG/ML
4 INJECTION, SOLUTION INTRAMUSCULAR; INTRAVENOUS ONCE
Status: COMPLETED | OUTPATIENT
Start: 2025-07-07 | End: 2025-07-07

## 2025-07-07 RX ADMIN — HYDROMORPHONE HYDROCHLORIDE 0.6 MG: 1 INJECTION, SOLUTION INTRAMUSCULAR; INTRAVENOUS; SUBCUTANEOUS at 23:42

## 2025-07-07 RX ADMIN — SODIUM CHLORIDE 500 ML: 0.9 INJECTION, SOLUTION INTRAVENOUS at 23:42

## 2025-07-07 RX ADMIN — AMLODIPINE BESYLATE 10 MG: 10 TABLET ORAL at 23:42

## 2025-07-07 RX ADMIN — ONDANSETRON 4 MG: 2 INJECTION INTRAMUSCULAR; INTRAVENOUS at 20:42

## 2025-07-07 RX ADMIN — MORPHINE SULFATE 4 MG: 4 INJECTION, SOLUTION INTRAMUSCULAR; INTRAVENOUS at 20:42

## 2025-07-07 RX ADMIN — SODIUM CHLORIDE 1000 ML: 0.9 INJECTION, SOLUTION INTRAVENOUS at 20:42

## 2025-07-07 RX ADMIN — CEFTRIAXONE 1 G: 1 INJECTION, SOLUTION INTRAVENOUS at 20:42

## 2025-07-07 ASSESSMENT — ENCOUNTER SYMPTOMS
COUGH: 0
DIARRHEA: 0
ABDOMINAL PAIN: 0
NAUSEA: 1
SHORTNESS OF BREATH: 0
CONSTIPATION: 0
ACTIVITY CHANGE: 1
CHILLS: 1
WHEEZING: 0
VOMITING: 1
HEMATURIA: 0
WOUND: 0
FEVER: 0
FATIGUE: 1
HEADACHES: 0
WEAKNESS: 0
BACK PAIN: 0
PALPITATIONS: 0
TREMORS: 0
JOINT SWELLING: 0
CHEST TIGHTNESS: 0
FLANK PAIN: 1

## 2025-07-07 ASSESSMENT — PAIN DESCRIPTION - PAIN TYPE: TYPE: ACUTE PAIN

## 2025-07-07 ASSESSMENT — PAIN - FUNCTIONAL ASSESSMENT
PAIN_FUNCTIONAL_ASSESSMENT: 0-10

## 2025-07-07 ASSESSMENT — PAIN DESCRIPTION - PROGRESSION: CLINICAL_PROGRESSION: NOT CHANGED

## 2025-07-07 ASSESSMENT — PAIN SCALES - GENERAL
PAINLEVEL_OUTOF10: 10 - WORST POSSIBLE PAIN

## 2025-07-07 ASSESSMENT — PAIN DESCRIPTION - LOCATION: LOCATION: BACK

## 2025-07-07 ASSESSMENT — PAIN DESCRIPTION - DESCRIPTORS: DESCRIPTORS: PRESSURE

## 2025-07-07 ASSESSMENT — PAIN DESCRIPTION - ORIENTATION: ORIENTATION: LEFT

## 2025-07-07 NOTE — ED TRIAGE NOTES
Recent Sx for kidney stone for right, now presenting with severe left flank pain, sediments in urine, pain causing nausea and dry heaving. Has daniel catheter. Denies fevers and chills.

## 2025-07-07 NOTE — ED TRIAGE NOTES
TRIAGE NOTE   I saw the patient as the Clinician in Triage and performed a brief history and physical exam, established acuity, and ordered appropriate tests to develop basic plan of care. Patient will be seen by an SU[facility]IE, resident and/or physician who will independently evaluate the patient. Please see subsequent provider notes for further details and disposition.     Brief HPI: In brief, Paul Bruno is a 70 y.o. male that presents for left flank pain.  History of kidney stones.  States he has not noticed any changes in his urine today.  Pain started suddenly at 2 AM this morning.  He is having some nausea..     Focused Physical exam:   Alert and oriented, regular rate and rhythm, left flank tenderness on exam.  Abdomen is soft and nonperitoneal    Plan/MDM:   CBC, BMP, urinalysis, CT abdomen and pelvis without contrast    Please see subsequent provider note for further details and disposition

## 2025-07-08 ENCOUNTER — ANESTHESIA (OUTPATIENT)
Dept: OPERATING ROOM | Facility: HOSPITAL | Age: 70
End: 2025-07-08
Payer: COMMERCIAL

## 2025-07-08 ENCOUNTER — APPOINTMENT (OUTPATIENT)
Dept: RADIOLOGY | Facility: HOSPITAL | Age: 70
DRG: 659 | End: 2025-07-08
Payer: COMMERCIAL

## 2025-07-08 ENCOUNTER — ANESTHESIA EVENT (OUTPATIENT)
Dept: OPERATING ROOM | Facility: HOSPITAL | Age: 70
End: 2025-07-08
Payer: COMMERCIAL

## 2025-07-08 PROBLEM — N20.1 BILATERAL URETERAL CALCULI: Status: ACTIVE | Noted: 2025-07-07

## 2025-07-08 LAB
ALBUMIN SERPL BCP-MCNC: 3 G/DL (ref 3.4–5)
ALP SERPL-CCNC: 66 U/L (ref 33–136)
ALT SERPL W P-5'-P-CCNC: 13 U/L (ref 10–52)
AMORPH CRY #/AREA UR COMP ASSIST: ABNORMAL /HPF
ANION GAP SERPL CALC-SCNC: 12 MMOL/L (ref 10–20)
APPEARANCE UR: ABNORMAL
AST SERPL W P-5'-P-CCNC: 15 U/L (ref 9–39)
BACTERIA #/AREA URNS AUTO: ABNORMAL /HPF
BASOPHILS # BLD AUTO: 0.07 X10*3/UL (ref 0–0.1)
BASOPHILS NFR BLD AUTO: 0.2 %
BILIRUB SERPL-MCNC: 0.4 MG/DL (ref 0–1.2)
BILIRUB UR STRIP.AUTO-MCNC: NEGATIVE MG/DL
BUN SERPL-MCNC: 22 MG/DL (ref 6–23)
CALCIUM SERPL-MCNC: 9.2 MG/DL (ref 8.6–10.3)
CARDIAC TROPONIN I PNL SERPL HS: 7 NG/L (ref 0–20)
CHLORIDE SERPL-SCNC: 107 MMOL/L (ref 98–107)
CO2 SERPL-SCNC: 23 MMOL/L (ref 21–32)
COLOR UR: YELLOW
CREAT SERPL-MCNC: 2.21 MG/DL (ref 0.5–1.3)
CRP SERPL-MCNC: 11.31 MG/DL
EGFRCR SERPLBLD CKD-EPI 2021: 31 ML/MIN/1.73M*2
EOSINOPHIL # BLD AUTO: 0.16 X10*3/UL (ref 0–0.4)
EOSINOPHIL NFR BLD AUTO: 0.4 %
ERYTHROCYTE [DISTWIDTH] IN BLOOD BY AUTOMATED COUNT: 18.4 % (ref 11.5–14.5)
ERYTHROCYTE [DISTWIDTH] IN BLOOD BY AUTOMATED COUNT: 18.4 % (ref 11.5–14.5)
ERYTHROCYTE [SEDIMENTATION RATE] IN BLOOD BY WESTERGREN METHOD: 66 MM/H (ref 0–20)
GLUCOSE SERPL-MCNC: 119 MG/DL (ref 74–99)
GLUCOSE UR STRIP.AUTO-MCNC: NORMAL MG/DL
HCT VFR BLD AUTO: 27.8 % (ref 41–52)
HCT VFR BLD AUTO: 27.8 % (ref 41–52)
HGB BLD-MCNC: 9.1 G/DL (ref 13.5–17.5)
HGB BLD-MCNC: 9.1 G/DL (ref 13.5–17.5)
IMM GRANULOCYTES # BLD AUTO: 0.4 X10*3/UL (ref 0–0.5)
IMM GRANULOCYTES NFR BLD AUTO: 1.1 % (ref 0–0.9)
KETONES UR STRIP.AUTO-MCNC: NEGATIVE MG/DL
LEUKOCYTE ESTERASE UR QL STRIP.AUTO: ABNORMAL
LYMPHOCYTES # BLD AUTO: 1.24 X10*3/UL (ref 0.8–3)
LYMPHOCYTES NFR BLD AUTO: 3.4 %
MCH RBC QN AUTO: 24.2 PG (ref 26–34)
MCH RBC QN AUTO: 24.2 PG (ref 26–34)
MCHC RBC AUTO-ENTMCNC: 32.7 G/DL (ref 32–36)
MCHC RBC AUTO-ENTMCNC: 32.7 G/DL (ref 32–36)
MCV RBC AUTO: 74 FL (ref 80–100)
MCV RBC AUTO: 74 FL (ref 80–100)
MONOCYTES # BLD AUTO: 1.16 X10*3/UL (ref 0.05–0.8)
MONOCYTES NFR BLD AUTO: 3.1 %
NEUTROPHILS # BLD AUTO: 33.82 X10*3/UL (ref 1.6–5.5)
NEUTROPHILS NFR BLD AUTO: 91.8 %
NITRITE UR QL STRIP.AUTO: NEGATIVE
NRBC BLD-RTO: 0 /100 WBCS (ref 0–0)
NRBC BLD-RTO: 0 /100 WBCS (ref 0–0)
PATH REVIEW-CBC DIFFERENTIAL: NORMAL
PH UR STRIP.AUTO: 8 [PH]
PLATELET # BLD AUTO: 911 X10*3/UL (ref 150–450)
PLATELET # BLD AUTO: 911 X10*3/UL (ref 150–450)
POTASSIUM SERPL-SCNC: 5 MMOL/L (ref 3.5–5.3)
PROCALCITONIN SERPL-MCNC: 25.11 NG/ML
PROT SERPL-MCNC: 6.3 G/DL (ref 6.4–8.2)
PROT UR STRIP.AUTO-MCNC: ABNORMAL MG/DL
RBC # BLD AUTO: 3.76 X10*6/UL (ref 4.5–5.9)
RBC # BLD AUTO: 3.76 X10*6/UL (ref 4.5–5.9)
RBC # UR STRIP.AUTO: ABNORMAL MG/DL
RBC #/AREA URNS AUTO: >20 /HPF
SODIUM SERPL-SCNC: 137 MMOL/L (ref 136–145)
SP GR UR STRIP.AUTO: 1.02
UROBILINOGEN UR STRIP.AUTO-MCNC: NORMAL MG/DL
WBC # BLD AUTO: 36.7 X10*3/UL (ref 4.4–11.3)
WBC # BLD AUTO: 36.7 X10*3/UL (ref 4.4–11.3)
WBC #/AREA URNS AUTO: >50 /HPF

## 2025-07-08 PROCEDURE — 3700000002 HC GENERAL ANESTHESIA TIME - EACH INCREMENTAL 1 MINUTE: Performed by: UROLOGY

## 2025-07-08 PROCEDURE — 99222 1ST HOSP IP/OBS MODERATE 55: CPT

## 2025-07-08 PROCEDURE — BT141ZZ FLUOROSCOPY OF KIDNEYS, URETERS AND BLADDER USING LOW OSMOLAR CONTRAST: ICD-10-PCS | Performed by: UROLOGY

## 2025-07-08 PROCEDURE — 84145 PROCALCITONIN (PCT): CPT | Mod: AHULAB

## 2025-07-08 PROCEDURE — 3600000003 HC OR TIME - INITIAL BASE CHARGE - PROCEDURE LEVEL THREE: Performed by: UROLOGY

## 2025-07-08 PROCEDURE — 99100 ANES PT EXTEME AGE<1 YR&>70: CPT | Performed by: ANESTHESIOLOGY

## 2025-07-08 PROCEDURE — 2500000004 HC RX 250 GENERAL PHARMACY W/ HCPCS (ALT 636 FOR OP/ED)

## 2025-07-08 PROCEDURE — 2720000007 HC OR 272 NO HCPCS: Performed by: UROLOGY

## 2025-07-08 PROCEDURE — 84520 ASSAY OF UREA NITROGEN: CPT

## 2025-07-08 PROCEDURE — 2500000005 HC RX 250 GENERAL PHARMACY W/O HCPCS

## 2025-07-08 PROCEDURE — C1769 GUIDE WIRE: HCPCS | Performed by: UROLOGY

## 2025-07-08 PROCEDURE — 87040 BLOOD CULTURE FOR BACTERIA: CPT | Mod: AHULAB

## 2025-07-08 PROCEDURE — 3700000001 HC GENERAL ANESTHESIA TIME - INITIAL BASE CHARGE: Performed by: UROLOGY

## 2025-07-08 PROCEDURE — 81001 URINALYSIS AUTO W/SCOPE: CPT | Performed by: NURSE PRACTITIONER

## 2025-07-08 PROCEDURE — 2780000003 HC OR 278 NO HCPCS: Performed by: UROLOGY

## 2025-07-08 PROCEDURE — 99231 SBSQ HOSP IP/OBS SF/LOW 25: CPT

## 2025-07-08 PROCEDURE — 85652 RBC SED RATE AUTOMATED: CPT

## 2025-07-08 PROCEDURE — A52332 PR CYSTOSCOPY,INSERT URETERAL STENT: Performed by: NURSE ANESTHETIST, CERTIFIED REGISTERED

## 2025-07-08 PROCEDURE — 0T788DZ DILATION OF BILATERAL URETERS WITH INTRALUMINAL DEVICE, VIA NATURAL OR ARTIFICIAL OPENING ENDOSCOPIC: ICD-10-PCS | Performed by: UROLOGY

## 2025-07-08 PROCEDURE — 2500000005 HC RX 250 GENERAL PHARMACY W/O HCPCS: Performed by: UROLOGY

## 2025-07-08 PROCEDURE — 7100000001 HC RECOVERY ROOM TIME - INITIAL BASE CHARGE: Performed by: UROLOGY

## 2025-07-08 PROCEDURE — 76000 FLUOROSCOPY <1 HR PHYS/QHP: CPT | Mod: 50

## 2025-07-08 PROCEDURE — A52332 PR CYSTOSCOPY,INSERT URETERAL STENT: Performed by: ANESTHESIOLOGY

## 2025-07-08 PROCEDURE — 3600000008 HC OR TIME - EACH INCREMENTAL 1 MINUTE - PROCEDURE LEVEL THREE: Performed by: UROLOGY

## 2025-07-08 PROCEDURE — 86140 C-REACTIVE PROTEIN: CPT

## 2025-07-08 PROCEDURE — 2550000001 HC RX 255 CONTRASTS: Mod: JW | Performed by: UROLOGY

## 2025-07-08 PROCEDURE — 2500000004 HC RX 250 GENERAL PHARMACY W/ HCPCS (ALT 636 FOR OP/ED): Performed by: NURSE ANESTHETIST, CERTIFIED REGISTERED

## 2025-07-08 PROCEDURE — 2500000005 HC RX 250 GENERAL PHARMACY W/O HCPCS: Performed by: ANESTHESIOLOGY

## 2025-07-08 PROCEDURE — 85025 COMPLETE CBC W/AUTO DIFF WBC: CPT

## 2025-07-08 PROCEDURE — 99233 SBSQ HOSP IP/OBS HIGH 50: CPT

## 2025-07-08 PROCEDURE — 84484 ASSAY OF TROPONIN QUANT: CPT | Performed by: INTERNAL MEDICINE

## 2025-07-08 PROCEDURE — 36415 COLL VENOUS BLD VENIPUNCTURE: CPT

## 2025-07-08 PROCEDURE — 2500000001 HC RX 250 WO HCPCS SELF ADMINISTERED DRUGS (ALT 637 FOR MEDICARE OP)

## 2025-07-08 PROCEDURE — 52332 CYSTOSCOPY AND TREATMENT: CPT | Performed by: UROLOGY

## 2025-07-08 PROCEDURE — 7100000002 HC RECOVERY ROOM TIME - EACH INCREMENTAL 1 MINUTE: Performed by: UROLOGY

## 2025-07-08 PROCEDURE — 1200000002 HC GENERAL ROOM WITH TELEMETRY DAILY

## 2025-07-08 PROCEDURE — C2617 STENT, NON-COR, TEM W/O DEL: HCPCS | Performed by: UROLOGY

## 2025-07-08 PROCEDURE — 99222 1ST HOSP IP/OBS MODERATE 55: CPT | Performed by: UROLOGY

## 2025-07-08 DEVICE — STENT, TRIA URETHERAL, SOFT, 6 X  26: Type: IMPLANTABLE DEVICE | Site: URETER | Status: FUNCTIONAL

## 2025-07-08 RX ORDER — SODIUM CHLORIDE 0.9 G/100ML
INJECTION, SOLUTION IRRIGATION AS NEEDED
Status: DISCONTINUED | OUTPATIENT
Start: 2025-07-08 | End: 2025-07-08 | Stop reason: HOSPADM

## 2025-07-08 RX ORDER — ACETAMINOPHEN 325 MG/1
975 TABLET ORAL EVERY 8 HOURS PRN
Status: DISCONTINUED | OUTPATIENT
Start: 2025-07-08 | End: 2025-07-11 | Stop reason: HOSPADM

## 2025-07-08 RX ORDER — LIDOCAINE HYDROCHLORIDE 20 MG/ML
INJECTION, SOLUTION INFILTRATION; PERINEURAL AS NEEDED
Status: DISCONTINUED | OUTPATIENT
Start: 2025-07-08 | End: 2025-07-08

## 2025-07-08 RX ORDER — FENTANYL CITRATE 50 UG/ML
INJECTION, SOLUTION INTRAMUSCULAR; INTRAVENOUS AS NEEDED
Status: DISCONTINUED | OUTPATIENT
Start: 2025-07-08 | End: 2025-07-08

## 2025-07-08 RX ORDER — ACETAMINOPHEN 325 MG/1
650 TABLET ORAL EVERY 4 HOURS PRN
Status: DISCONTINUED | OUTPATIENT
Start: 2025-07-08 | End: 2025-07-08 | Stop reason: HOSPADM

## 2025-07-08 RX ORDER — ONDANSETRON HYDROCHLORIDE 2 MG/ML
4 INJECTION, SOLUTION INTRAVENOUS ONCE AS NEEDED
Status: DISCONTINUED | OUTPATIENT
Start: 2025-07-08 | End: 2025-07-08 | Stop reason: HOSPADM

## 2025-07-08 RX ORDER — CEFAZOLIN 1 G/1
INJECTION, POWDER, FOR SOLUTION INTRAVENOUS AS NEEDED
Status: DISCONTINUED | OUTPATIENT
Start: 2025-07-08 | End: 2025-07-08

## 2025-07-08 RX ORDER — SODIUM CHLORIDE, SODIUM LACTATE, POTASSIUM CHLORIDE, CALCIUM CHLORIDE 600; 310; 30; 20 MG/100ML; MG/100ML; MG/100ML; MG/100ML
75 INJECTION, SOLUTION INTRAVENOUS CONTINUOUS
Status: ACTIVE | OUTPATIENT
Start: 2025-07-08 | End: 2025-07-08

## 2025-07-08 RX ORDER — ALBUTEROL SULFATE 0.83 MG/ML
2.5 SOLUTION RESPIRATORY (INHALATION) ONCE AS NEEDED
Status: DISCONTINUED | OUTPATIENT
Start: 2025-07-08 | End: 2025-07-08 | Stop reason: HOSPADM

## 2025-07-08 RX ORDER — DROPERIDOL 2.5 MG/ML
0.62 INJECTION, SOLUTION INTRAMUSCULAR; INTRAVENOUS ONCE AS NEEDED
Status: DISCONTINUED | OUTPATIENT
Start: 2025-07-08 | End: 2025-07-08 | Stop reason: HOSPADM

## 2025-07-08 RX ORDER — PHENYLEPHRINE HCL IN 0.9% NACL 1 MG/10 ML
SYRINGE (ML) INTRAVENOUS AS NEEDED
Status: DISCONTINUED | OUTPATIENT
Start: 2025-07-08 | End: 2025-07-08

## 2025-07-08 RX ORDER — OXYCODONE HYDROCHLORIDE 5 MG/1
5 TABLET ORAL EVERY 4 HOURS PRN
Status: DISCONTINUED | OUTPATIENT
Start: 2025-07-08 | End: 2025-07-08 | Stop reason: HOSPADM

## 2025-07-08 RX ORDER — PROPOFOL 10 MG/ML
INJECTION, EMULSION INTRAVENOUS AS NEEDED
Status: DISCONTINUED | OUTPATIENT
Start: 2025-07-08 | End: 2025-07-08

## 2025-07-08 RX ORDER — DIPHENHYDRAMINE HYDROCHLORIDE 50 MG/ML
25 INJECTION, SOLUTION INTRAMUSCULAR; INTRAVENOUS ONCE AS NEEDED
Status: DISCONTINUED | OUTPATIENT
Start: 2025-07-08 | End: 2025-07-08 | Stop reason: HOSPADM

## 2025-07-08 RX ADMIN — SODIUM CHLORIDE, SODIUM LACTATE, POTASSIUM CHLORIDE, AND CALCIUM CHLORIDE 75 ML/HR: .6; .31; .03; .02 INJECTION, SOLUTION INTRAVENOUS at 08:29

## 2025-07-08 RX ADMIN — PIPERACILLIN SODIUM AND TAZOBACTAM SODIUM 3.38 G: 3; .375 INJECTION, SOLUTION INTRAVENOUS at 18:42

## 2025-07-08 RX ADMIN — Medication 100 MCG: at 15:50

## 2025-07-08 RX ADMIN — FENTANYL CITRATE 25 MCG: 50 INJECTION, SOLUTION INTRAMUSCULAR; INTRAVENOUS at 15:31

## 2025-07-08 RX ADMIN — LIDOCAINE HYDROCHLORIDE 60 MG: 20 INJECTION, SOLUTION INFILTRATION; PERINEURAL at 15:31

## 2025-07-08 RX ADMIN — MORPHINE SULFATE 4 MG: 4 INJECTION, SOLUTION INTRAMUSCULAR; INTRAVENOUS at 06:19

## 2025-07-08 RX ADMIN — Medication 100 MCG: at 15:45

## 2025-07-08 RX ADMIN — DEXAMETHASONE SODIUM PHOSPHATE 4 MG: 4 INJECTION, SOLUTION INTRA-ARTICULAR; INTRALESIONAL; INTRAMUSCULAR; INTRAVENOUS; SOFT TISSUE at 15:37

## 2025-07-08 RX ADMIN — PROPOFOL 100 MG: 10 INJECTION, EMULSION INTRAVENOUS at 15:31

## 2025-07-08 RX ADMIN — FENTANYL CITRATE 25 MCG: 50 INJECTION, SOLUTION INTRAMUSCULAR; INTRAVENOUS at 15:40

## 2025-07-08 RX ADMIN — AMLODIPINE BESYLATE 10 MG: 10 TABLET ORAL at 21:08

## 2025-07-08 RX ADMIN — MORPHINE SULFATE 4 MG: 4 INJECTION, SOLUTION INTRAMUSCULAR; INTRAVENOUS at 02:34

## 2025-07-08 RX ADMIN — Medication 100 MCG: at 15:36

## 2025-07-08 RX ADMIN — Medication 4 L/MIN: at 16:02

## 2025-07-08 RX ADMIN — ONDANSETRON 4 MG: 2 INJECTION INTRAMUSCULAR; INTRAVENOUS at 15:37

## 2025-07-08 RX ADMIN — PIPERACILLIN SODIUM AND TAZOBACTAM SODIUM 3.38 G: 3; .375 INJECTION, SOLUTION INTRAVENOUS at 13:56

## 2025-07-08 RX ADMIN — HYDROMORPHONE HYDROCHLORIDE 0.6 MG: 1 INJECTION, SOLUTION INTRAMUSCULAR; INTRAVENOUS; SUBCUTANEOUS at 10:17

## 2025-07-08 RX ADMIN — MORPHINE SULFATE 4 MG: 4 INJECTION, SOLUTION INTRAMUSCULAR; INTRAVENOUS at 16:53

## 2025-07-08 RX ADMIN — MORPHINE SULFATE 4 MG: 4 INJECTION, SOLUTION INTRAMUSCULAR; INTRAVENOUS at 21:08

## 2025-07-08 RX ADMIN — CEFAZOLIN 2 G: 330 INJECTION, POWDER, FOR SOLUTION INTRAMUSCULAR; INTRAVENOUS at 15:33

## 2025-07-08 RX ADMIN — ACETAMINOPHEN 975 MG: 325 TABLET ORAL at 11:31

## 2025-07-08 RX ADMIN — PANTOPRAZOLE SODIUM 40 MG: 40 INJECTION, POWDER, FOR SOLUTION INTRAVENOUS at 06:19

## 2025-07-08 RX ADMIN — Medication 6 MG: at 02:34

## 2025-07-08 SDOH — ECONOMIC STABILITY: FOOD INSECURITY: HOW HARD IS IT FOR YOU TO PAY FOR THE VERY BASICS LIKE FOOD, HOUSING, MEDICAL CARE, AND HEATING?: NOT HARD AT ALL

## 2025-07-08 SDOH — SOCIAL STABILITY: SOCIAL INSECURITY: DO YOU FEEL UNSAFE GOING BACK TO THE PLACE WHERE YOU ARE LIVING?: NO

## 2025-07-08 SDOH — ECONOMIC STABILITY: FOOD INSECURITY: WITHIN THE PAST 12 MONTHS, YOU WORRIED THAT YOUR FOOD WOULD RUN OUT BEFORE YOU GOT THE MONEY TO BUY MORE.: NEVER TRUE

## 2025-07-08 SDOH — ECONOMIC STABILITY: HOUSING INSECURITY: IN THE LAST 12 MONTHS, WAS THERE A TIME WHEN YOU WERE NOT ABLE TO PAY THE MORTGAGE OR RENT ON TIME?: NO

## 2025-07-08 SDOH — HEALTH STABILITY: PHYSICAL HEALTH: ON AVERAGE, HOW MANY DAYS PER WEEK DO YOU ENGAGE IN MODERATE TO STRENUOUS EXERCISE (LIKE A BRISK WALK)?: 0 DAYS

## 2025-07-08 SDOH — SOCIAL STABILITY: SOCIAL INSECURITY: DO YOU FEEL ANYONE HAS EXPLOITED OR TAKEN ADVANTAGE OF YOU FINANCIALLY OR OF YOUR PERSONAL PROPERTY?: NO

## 2025-07-08 SDOH — SOCIAL STABILITY: SOCIAL INSECURITY: WITHIN THE LAST YEAR, HAVE YOU BEEN AFRAID OF YOUR PARTNER OR EX-PARTNER?: NO

## 2025-07-08 SDOH — SOCIAL STABILITY: SOCIAL INSECURITY: WITHIN THE LAST YEAR, HAVE YOU BEEN HUMILIATED OR EMOTIONALLY ABUSED IN OTHER WAYS BY YOUR PARTNER OR EX-PARTNER?: NO

## 2025-07-08 SDOH — HEALTH STABILITY: PHYSICAL HEALTH: ON AVERAGE, HOW MANY MINUTES DO YOU ENGAGE IN EXERCISE AT THIS LEVEL?: 0 MIN

## 2025-07-08 SDOH — ECONOMIC STABILITY: INCOME INSECURITY: IN THE PAST 12 MONTHS HAS THE ELECTRIC, GAS, OIL, OR WATER COMPANY THREATENED TO SHUT OFF SERVICES IN YOUR HOME?: NO

## 2025-07-08 SDOH — ECONOMIC STABILITY: HOUSING INSECURITY: IN THE PAST 12 MONTHS, HOW MANY TIMES HAVE YOU MOVED WHERE YOU WERE LIVING?: 0

## 2025-07-08 SDOH — SOCIAL STABILITY: SOCIAL INSECURITY: HAVE YOU HAD THOUGHTS OF HARMING ANYONE ELSE?: NO

## 2025-07-08 SDOH — ECONOMIC STABILITY: FOOD INSECURITY: WITHIN THE PAST 12 MONTHS, THE FOOD YOU BOUGHT JUST DIDN'T LAST AND YOU DIDN'T HAVE MONEY TO GET MORE.: NEVER TRUE

## 2025-07-08 SDOH — ECONOMIC STABILITY: HOUSING INSECURITY: AT ANY TIME IN THE PAST 12 MONTHS, WERE YOU HOMELESS OR LIVING IN A SHELTER (INCLUDING NOW)?: NO

## 2025-07-08 SDOH — SOCIAL STABILITY: SOCIAL INSECURITY: HAS ANYONE EVER THREATENED TO HURT YOUR FAMILY OR YOUR PETS?: NO

## 2025-07-08 SDOH — ECONOMIC STABILITY: TRANSPORTATION INSECURITY: IN THE PAST 12 MONTHS, HAS LACK OF TRANSPORTATION KEPT YOU FROM MEDICAL APPOINTMENTS OR FROM GETTING MEDICATIONS?: NO

## 2025-07-08 SDOH — SOCIAL STABILITY: SOCIAL INSECURITY: ABUSE: ADULT

## 2025-07-08 SDOH — SOCIAL STABILITY: SOCIAL INSECURITY: WERE YOU ABLE TO COMPLETE ALL THE BEHAVIORAL HEALTH SCREENINGS?: YES

## 2025-07-08 SDOH — SOCIAL STABILITY: SOCIAL INSECURITY: DOES ANYONE TRY TO KEEP YOU FROM HAVING/CONTACTING OTHER FRIENDS OR DOING THINGS OUTSIDE YOUR HOME?: NO

## 2025-07-08 SDOH — SOCIAL STABILITY: SOCIAL INSECURITY: ARE THERE ANY APPARENT SIGNS OF INJURIES/BEHAVIORS THAT COULD BE RELATED TO ABUSE/NEGLECT?: NO

## 2025-07-08 SDOH — SOCIAL STABILITY: SOCIAL INSECURITY: HAVE YOU HAD ANY THOUGHTS OF HARMING ANYONE ELSE?: NO

## 2025-07-08 SDOH — SOCIAL STABILITY: SOCIAL INSECURITY: ARE YOU OR HAVE YOU BEEN THREATENED OR ABUSED PHYSICALLY, EMOTIONALLY, OR SEXUALLY BY ANYONE?: NO

## 2025-07-08 ASSESSMENT — PAIN - FUNCTIONAL ASSESSMENT
PAIN_FUNCTIONAL_ASSESSMENT: 0-10
PAIN_FUNCTIONAL_ASSESSMENT: UNABLE TO SELF-REPORT

## 2025-07-08 ASSESSMENT — PAIN SCALES - GENERAL
PAINLEVEL_OUTOF10: 9
PAINLEVEL_OUTOF10: 3
PAINLEVEL_OUTOF10: 10 - WORST POSSIBLE PAIN
PAINLEVEL_OUTOF10: 0 - NO PAIN
PAINLEVEL_OUTOF10: 10 - WORST POSSIBLE PAIN
PAINLEVEL_OUTOF10: 4
PAINLEVEL_OUTOF10: 8
PAINLEVEL_OUTOF10: 5 - MODERATE PAIN
PAINLEVEL_OUTOF10: 5 - MODERATE PAIN
PAINLEVEL_OUTOF10: 0 - NO PAIN
PAINLEVEL_OUTOF10: 10 - WORST POSSIBLE PAIN
PAINLEVEL_OUTOF10: 9

## 2025-07-08 ASSESSMENT — COGNITIVE AND FUNCTIONAL STATUS - GENERAL
EATING MEALS: A LITTLE
CLIMB 3 TO 5 STEPS WITH RAILING: A LITTLE
HELP NEEDED FOR BATHING: A LITTLE
WALKING IN HOSPITAL ROOM: A LITTLE
WALKING IN HOSPITAL ROOM: A LITTLE
DRESSING REGULAR LOWER BODY CLOTHING: A LITTLE
STANDING UP FROM CHAIR USING ARMS: A LITTLE
DAILY ACTIVITIY SCORE: 18
HELP NEEDED FOR BATHING: A LITTLE
DRESSING REGULAR UPPER BODY CLOTHING: A LITTLE
MOBILITY SCORE: 18
HELP NEEDED FOR BATHING: A LITTLE
MOBILITY SCORE: 18
MOBILITY SCORE: 18
TURNING FROM BACK TO SIDE WHILE IN FLAT BAD: A LITTLE
EATING MEALS: A LITTLE
EATING MEALS: A LITTLE
TOILETING: A LITTLE
TOILETING: A LITTLE
DRESSING REGULAR UPPER BODY CLOTHING: A LITTLE
WALKING IN HOSPITAL ROOM: A LITTLE
DAILY ACTIVITIY SCORE: 18
STANDING UP FROM CHAIR USING ARMS: A LITTLE
DRESSING REGULAR UPPER BODY CLOTHING: A LITTLE
STANDING UP FROM CHAIR USING ARMS: A LITTLE
PERSONAL GROOMING: A LITTLE
MOVING FROM LYING ON BACK TO SITTING ON SIDE OF FLAT BED WITH BEDRAILS: A LITTLE
DRESSING REGULAR LOWER BODY CLOTHING: A LITTLE
DAILY ACTIVITIY SCORE: 18
MOVING TO AND FROM BED TO CHAIR: A LITTLE
PERSONAL GROOMING: A LITTLE
MOVING TO AND FROM BED TO CHAIR: A LITTLE
PATIENT BASELINE BEDBOUND: NO
PERSONAL GROOMING: A LITTLE
MOVING FROM LYING ON BACK TO SITTING ON SIDE OF FLAT BED WITH BEDRAILS: A LITTLE
TOILETING: A LITTLE
CLIMB 3 TO 5 STEPS WITH RAILING: A LITTLE
CLIMB 3 TO 5 STEPS WITH RAILING: A LITTLE
TURNING FROM BACK TO SIDE WHILE IN FLAT BAD: A LITTLE
DRESSING REGULAR LOWER BODY CLOTHING: A LITTLE
TURNING FROM BACK TO SIDE WHILE IN FLAT BAD: A LITTLE
MOVING FROM LYING ON BACK TO SITTING ON SIDE OF FLAT BED WITH BEDRAILS: A LITTLE
MOVING TO AND FROM BED TO CHAIR: A LITTLE

## 2025-07-08 ASSESSMENT — PAIN DESCRIPTION - DESCRIPTORS
DESCRIPTORS: ACHING;CRAMPING
DESCRIPTORS: ACHING;DISCOMFORT
DESCRIPTORS: ACHING;DISCOMFORT
DESCRIPTORS: ACHING;CRAMPING;DISCOMFORT
DESCRIPTORS: SHARP
DESCRIPTORS: ACHING;CRAMPING

## 2025-07-08 ASSESSMENT — ACTIVITIES OF DAILY LIVING (ADL)
BATHING: INDEPENDENT
FEEDING YOURSELF: INDEPENDENT
PATIENT'S MEMORY ADEQUATE TO SAFELY COMPLETE DAILY ACTIVITIES?: YES
ASSISTIVE_DEVICE: DENTURES LOWER;DENTURES UPPER
WALKS IN HOME: NEEDS ASSISTANCE
HEARING - RIGHT EAR: FUNCTIONAL
HEARING - LEFT EAR: FUNCTIONAL
ADEQUATE_TO_COMPLETE_ADL: YES
JUDGMENT_ADEQUATE_SAFELY_COMPLETE_DAILY_ACTIVITIES: YES
GROOMING: INDEPENDENT
LACK_OF_TRANSPORTATION: NO
DRESSING YOURSELF: INDEPENDENT
LACK_OF_TRANSPORTATION: NO
TOILETING: INDEPENDENT
LACK_OF_TRANSPORTATION: NO

## 2025-07-08 ASSESSMENT — LIFESTYLE VARIABLES
AUDIT-C TOTAL SCORE: 0
HOW MANY STANDARD DRINKS CONTAINING ALCOHOL DO YOU HAVE ON A TYPICAL DAY: PATIENT DOES NOT DRINK
PRESCIPTION_ABUSE_PAST_12_MONTHS: NO
SKIP TO QUESTIONS 9-10: 1
HOW OFTEN DO YOU HAVE A DRINK CONTAINING ALCOHOL: NEVER
AUDIT-C TOTAL SCORE: 0
SUBSTANCE_ABUSE_PAST_12_MONTHS: YES
HOW OFTEN DO YOU HAVE 6 OR MORE DRINKS ON ONE OCCASION: NEVER

## 2025-07-08 ASSESSMENT — PATIENT HEALTH QUESTIONNAIRE - PHQ9
SUM OF ALL RESPONSES TO PHQ9 QUESTIONS 1 & 2: 1
1. LITTLE INTEREST OR PLEASURE IN DOING THINGS: SEVERAL DAYS
2. FEELING DOWN, DEPRESSED OR HOPELESS: NOT AT ALL

## 2025-07-08 ASSESSMENT — PAIN DESCRIPTION - LOCATION
LOCATION: ABDOMEN

## 2025-07-08 ASSESSMENT — PAIN DESCRIPTION - ORIENTATION: ORIENTATION: LEFT;POSTERIOR;ANTERIOR

## 2025-07-08 NOTE — POST-PROCEDURE NOTE
Mr. Bruno is a 70 year old male who is POD #0 from a cystoscopy with bilateral ureteral stent insertion (Intraoperative Findings: R RPG with filling defect at UPJ. L RPG with no hydro or filling defects. 6x26JJ placed bilaterally without string. Daniel replaced at end of case). He reports adequate pain control post-operatively. Denies any nausea or vomiting. He does have a daniel catheter in place.     PE:  Constitutional: A&Ox3, calm and cooperative, NAD.  Eyes: PERRL, clear sclera.  ENMT: Moist mucous membranes.  Head/Neck: Neck supple.  Cardiovascular: Normal rate.  Respiratory/Thorax: Unlabored breathing.  Genitourinary: Daniel catheter in place with natalia colored urine in bag.   Psychological: Appropriate mood and behavior.  Skin: Warm and dry.    Radiology and labs reviewed. VSS, afebrile.    Plan:   - Diet: Regular   - Zosyn  - IVF  - Continue current pain regimen   - PRN antiemetic   - DVT Proph: SCDs/ ambulate/ Lovenox  - Bowel regimen: PRN Miralax  - Monitor VS every 4 hours   - Labs ordered for AM   - IS every hour while awake   - Encourage ambulation / OOB as tolerated   - Maintain daniel catheter. Monitor and record output.  - Continue supportive care  - F/u with Dr. Willis outpatient once d/c from hospital     Dispo: Pain and nausea control as needed. Monitor and record daniel catheter output.    Total time spent 25 minutes, and greater than 50% of time was spent in counseling/coordination of care.

## 2025-07-08 NOTE — ANESTHESIA PROCEDURE NOTES
Airway  Date/Time: 7/8/2025 3:32 PM  Reason: elective    Airway not difficult    Staffing  Performed: CRNA   Authorized by: Dwaine Evans MD    Performed by: TYLER Prieto  Patient location during procedure: OR    Patient Condition  Indications for airway management: anesthesia  Patient position: sniffing  Planned trial extubation  Sedation level: deep     Final Airway Details   Preoxygenated: yes  Final airway type: supraglottic airway  Successful airway: classic  Size: 4  Number of attempts at approach: 1

## 2025-07-08 NOTE — H&P
[facility] - GENERAL MEDICINE HISTORY AND PHYSICAL    HISTORY OF PRESENT ILLNESS     History Obtained From (Primary Source): Patient  Collateral History (Secondary Sources): D/w ED    History Of Present Illness (HPI):  Paul Bruno is a 70 y.o. male with PMHx s/f HTN, BPH, prostate CA s/p XRT, AAA, kidney stones s/p R percutaneous nephrolithotomy with stent placement 5/28 followed by lithotripsy 6/23, Connors dependent with recent exchange a few days ago presenting with severe right flank pain starting around 3am today. States to be different that his usual kidney stone related pain, which is achy vs today is more sharp and severe. Has chills, nausea vomiting and dry heaving. Denies fever, chest pain, sob, change in urine color/odor.    ED Course:   Vitals on presentation: T 36.7 °C (98.1 °F)  HR 81  /79  RR 20  O2 96 % None (Room air)  Labs:   CBC with WBC 20.8, Hgb 10.5, Plts 1,062.   CMP with glucose 102, Na 136, K 4.7, BUN 15, sCr 1.43, alk phos 72, ALT 15, AST 14, bilirubin 0.4.   BNP 64. Trop 6.   EKG: none  Imaging - agree with radiology interpretation(s):   CXR-no acute cardiopulmonary process  CTAP-fragmented staghorn calculus in left ureter.  Moderate left hydronephrosis and perinephritic fat stranding concerning for radius.  Interval removal of right double-J ureteral stent.  Moderate to severe right hydro nephrosis.  Second partial bladder wall thickening.  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  Multiple nodules along right major fissure.  Interventions: Normal saline 1 L, morphine 4 mg, Zofran 4 mg, Rocephin 1 g, admission for further management    12-point ROS reviewed and found to be negative aside from aforementioned positives in HPI and/or noted in dedicated ROS section below.     Decision made to admit the patient to the hospitalist service after evaluation of the patient, review of the above, and discussion with ED provider.     LABS AND IMAGING     I have  personally reviewed the following labs from 07/07/25: CBC, CMP, Troponin, and BNP  I have personally reviewed the following imaging studies from 07/07/25: CXR and CT Abd/Pelvis, with my personal interpretations as documented in ED course above.   I have personally reviewed any obtained EKGs on 07/07/25, with my interpretation as listed above in the ED summary course.   I have personally reviewed the patient's vitals on presentation to the ED and any/all changes through to time of admission (on 07/07/25).     ED Course (From ED Provider):  ED Course as of 07/07/25 2252 Mon Jul 07, 2025 2133 Discussed with patient about his AAA.  He was unaware of this although it was seen on prior CT.  I personally reviewed recent CT imaging with the paravertebral flattening seen before.  This is not new.  Patient will need vascular close follow-up. [CG]   2214 Trop/BNP unremarkable [CG]   2215 CXR No acute cardiopulmonary process. [CG]   2249 Patient was accepted for admission by Dr. Ramirez. [CG]      ED Course User Index  [CG] Denise Medrano MD         Diagnoses as of 07/07/25 2252   Pyelonephritis   Bilateral ureteral calculi   Abdominal aortic aneurysm (AAA) without rupture, unspecified part   Hypoxia     Relevant Results  Results for orders placed or performed during the hospital encounter of 07/07/25 (from the past 24 hours)   CBC and Auto Differential   Result Value Ref Range    WBC 20.8 (H) 4.4 - 11.3 x10*3/uL    nRBC 0.0 0.0 - 0.0 /100 WBCs    RBC 4.36 (L) 4.50 - 5.90 x10*6/uL    Hemoglobin 10.5 (L) 13.5 - 17.5 g/dL    Hematocrit 32.4 (L) 41.0 - 52.0 %    MCV 74 (L) 80 - 100 fL    MCH 24.1 (L) 26.0 - 34.0 pg    MCHC 32.4 32.0 - 36.0 g/dL    RDW 18.6 (H) 11.5 - 14.5 %    Platelets 1,062 (H) 150 - 450 x10*3/uL    Neutrophils % 86.5 40.0 - 80.0 %    Immature Granulocytes %, Automated 0.6 0.0 - 0.9 %    Lymphocytes % 8.5 13.0 - 44.0 %    Monocytes % 3.6 2.0 - 10.0 %    Eosinophils % 0.4 0.0 - 6.0 %    Basophils % 0.4  0.0 - 2.0 %    Neutrophils Absolute 18.02 (H) 1.60 - 5.50 x10*3/uL    Immature Granulocytes Absolute, Automated 0.12 0.00 - 0.50 x10*3/uL    Lymphocytes Absolute 1.77 0.80 - 3.00 x10*3/uL    Monocytes Absolute 0.76 0.05 - 0.80 x10*3/uL    Eosinophils Absolute 0.09 0.00 - 0.40 x10*3/uL    Basophils Absolute 0.08 0.00 - 0.10 x10*3/uL   Basic metabolic panel   Result Value Ref Range    Glucose 102 (H) 74 - 99 mg/dL    Sodium 136 136 - 145 mmol/L    Potassium 4.7 3.5 - 5.3 mmol/L    Chloride 103 98 - 107 mmol/L    Bicarbonate 24 21 - 32 mmol/L    Anion Gap 14 10 - 20 mmol/L    Urea Nitrogen 15 6 - 23 mg/dL    Creatinine 1.43 (H) 0.50 - 1.30 mg/dL    eGFR 52 (L) >60 mL/min/1.73m*2    Calcium 9.9 8.6 - 10.3 mg/dL   B-Type Natriuretic Peptide   Result Value Ref Range    BNP 64 0 - 99 pg/mL   Troponin I, High Sensitivity, Initial   Result Value Ref Range    Troponin I, High Sensitivity 6 0 - 20 ng/L      Imaging  XR chest 2 views  Result Date: 7/7/2025  No acute cardiopulmonary process.     MACRO: None.   Signed by: Wade Corcoran 7/7/2025 9:52 PM Dictation workstation:   [id number]    CT abdomen pelvis wo IV contrast  Result Date: 7/7/2025  1. Fragmented staghorn calculus in the left renal pelvis and probable 4 mm calculus in the proximal left ureter. There is moderate left hydronephrosis and perinephric fat stranding. Correlate for evidence of pyelonephritis. 2. Interval removal of a right double-J ureteral stent. 1.1 cm calculus at the right ureteropelvic junction with moderate-to-severe right hydronephrosis. Mild wall thickening of the right renal pelvis, correlate for pyelitis. 3. Circumferential bladder wall thickening in the setting of partial decompression with a Connors catheter. Correlate for cystitis. 4. 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 There is mild flattening/straightening of the posterior wall of the aorta again see adjacent vertebral body. This is a finding which may be seen in the setting  of impending rupture. Vascular surgery consultation/follow-up is recommended. 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 5. Multiple nodules along the right major fissure, at least 1 of which appears slightly increased in size. Similar appearance of the 1.6 cm juxtapleural nodularity along the left hemidiaphragm. These are noted to demonstrate PSMA expression on prior PET-CT. Consider follow-up PET-CT or dedicated chest imaging for further assessment.   MACRO: Critical Finding:  See findings. Notification was initiated on 7/7/2025 at 7:53 pm by  Gio Good.  (**-YCF-**) Instructions:   Signed by: Gio Good 7/7/2025 7:53 PM Dictation workstation:   [id number]      Cardiology, Vascular, and Other Imaging  No other imaging results found for the past 2 days       PAST HISTORIES AND ALLERGIES     Past Medical History  He has a past medical history of Adverse effect of anesthesia, BPH (benign prostatic hyperplasia), Hypertension, Nephrolithiasis, Personal history of irradiation, Prostate cancer (Multi), and Urinary tract infection.    He has no past medical history of Myocardial infarction (Multi), Seizures (Multi), or Stroke (Multi).    Surgical History  He has a past surgical history that includes Other surgical history (07/27/2022) and Kidney stone surgery.     Social History  He reports that he has been smoking cigars and cigarettes. He started smoking about 54 years ago. He has a 23 pack-year smoking history. He has never used smokeless tobacco. He reports that he does not currently use alcohol. He reports current drug use. Drug: Marijuana.    Family History  Family History[1]    Allergies  Patient has no known allergies.    MEDICATIONS     Scheduled Medications:  Scheduled Medications[2]  Continuous Medications:  Continuous Medications[3]  PRN Medications:  PRN Medications[4]     REVIEW OF SYSTEMS     Review of Systems   Constitutional:  Positive for activity change, chills and fatigue.  Negative for fever.   Respiratory:  Negative for cough, chest tightness, shortness of breath and wheezing.    Cardiovascular:  Negative for chest pain, palpitations and leg swelling.   Gastrointestinal:  Positive for nausea and vomiting. Negative for abdominal pain, constipation and diarrhea.   Genitourinary:  Positive for flank pain. Negative for hematuria.   Musculoskeletal:  Negative for back pain and joint swelling.   Skin:  Negative for rash and wound.   Neurological:  Negative for tremors, syncope, weakness and headaches.       OBJECTIVE     Last Recorded Vitals  BP (!) 198/98   Pulse 81   Temp 36.7 °C (98.1 °F) (Oral)   Resp 20   Wt 54.4 kg (120 lb)   SpO2 97%      Physical Exam:  Vital signs and nursing notes reviewed.   Constitutional: Ill-appearing, shaking and appears uncomfortable. Conversant.   Skin: Warm and dry; no obvious lesions, rashes, pallor, or jaundice.   Eyes: EOMI. Anicteric sclera.   ENT: Mucous membranes moist; no obvious injury or deformity appreciated.   Head and Neck: Normocephalic, atraumatic. ROM preserved. Trachea midline. No appreciable JVD.   Respiratory: Nonlabored on 3L NC. Lungs clear to auscultation bilaterally without obvious adventitious sounds. Chest rise is equal.  Cardiovascular: RRR. No gross murmur, gallop, or rub. Extremities are warm and well-perfused with good capillary refill (< 3 seconds). No chest wall tenderness.   GI: Abdomen soft, nontender, nondistended. No obvious organomegaly appreciated. Bowel sounds are present.  : Left CVA tenderness. Connors bag noted, draining yellow clear fluid in bag.  MSK: No gross abnormalities appreciated. No limitations to AROM/PROM appreciated.   Extremities: No cyanosis, edema, or clubbing evident. Neurovascularly intact.   Neuro: A&Ox3. CN 2-12 grossly intact. Able to respond to questions appropriately and clearly. No acute focal neurologic deficits appreciated.  Psych: Appropriate mood and behavior.    ASSESSMENT AND PLAN    Assessment/Plan     70 y.o. male with PMHx s/f HTN, BPH, prostate CA s/p XRT, AAA, kidney stones s/p R percutaneous nephrolithotomy with stent placement 5/28 followed by lithotripsy 6/23, Connors dependent with recent exchange a few days ago presenting with severe right flank pain starting around 3am today.    Pyelonephritis with bilateral ureteral calculi on chronic indwelling catheter, POA  -continue with IV rocephin, pain control  -urine culture pending  -urology consult, NPO at midnight in case of procedures  -does not meet sepsis criteria on admission    MAGDALENO with bilateral moderate to severe hydronephrosis 2/2 above  -Baseline serum creatinine: ~0.8-0.9  -Creatinine at admission: 1.43  -IVF: NS 1500 mL  -continue to trend    Acute hypoxia  -CXR, BNP negative  -currently on 3L NC after receiving morphine in ED, wean back to baseline RA as tolerated    HTN  -continue home amlodipine    AAA  -patient unaware of this finding, stable finding compared to prior CT  -follow-up vascular surgery outpatient    Diet: Regular and NPO @ MN  DVT Prophylaxis: SCDs, Lovenox   Code Status: Full Code   Case Discussed With: ED provider  Additional Sources Reviewed: ED note day of admission; Urology    Anticipated Length of Stay (LOS): Patient will require two-plus midnight stay for further evaluation and management of the above.      Jb Florez PA-C    Dragon dictation software was used to dictate this note and thus there may be minor errors in translation/transcription including garbled speech or misspellings. Please contact for clarification if needed.         [1]   Family History  Problem Relation Name Age of Onset    Hypertension Other grandparent    [2] amLODIPine, 10 mg, oral, q PM  [START ON 7/8/2025] cefTRIAXone, 1 g, intravenous, q24h  enoxaparin, 40 mg, subcutaneous, q24h  [START ON 7/8/2025] ferrous sulfate, 1 tablet, oral, Daily with breakfast  [START ON 7/8/2025] pantoprazole, 40 mg, oral, Daily before breakfast    Or  [START ON 7/8/2025] pantoprazole, 40 mg, intravenous, Daily before breakfast    [3]    [4] PRN medications: acetaminophen, benzocaine-menthol, dextromethorphan-guaifenesin, melatonin, morphine, morphine, ondansetron, polyethylene glycol

## 2025-07-08 NOTE — ANESTHESIA POSTPROCEDURE EVALUATION
Patient: Paul Bruno    Procedure Summary       Date: 07/08/25 Room / Location: [facility] 02 / Virtual [facility]    Anesthesia Start: 1526 Anesthesia Stop: 1605    Procedure: CYSTOSCOPY, WITH URETERAL STENT INSERTION (Bilateral: Bladder) Diagnosis:       Pyelonephritis      Bilateral ureteral calculi      (Pyelonephritis [N12])      (Bilateral ureteral calculi [N20.1])    Surgeons: Josr Delcid MD Responsible Provider: Dwaine Evans MD    Anesthesia Type: general ASA Status: 3            Anesthesia Type: general    Vitals Value Taken Time   /60 07/08/25 16:30   Temp 37.2 °C (99 °F) 07/08/25 16:02   Pulse 80 07/08/25 16:30   Resp 15 07/08/25 16:30   SpO2 100 % 07/08/25 16:30       Anesthesia Post Evaluation    Patient location during evaluation: PACU  Patient participation: complete - patient participated  Level of consciousness: awake  Pain management: adequate  Airway patency: patent  Cardiovascular status: acceptable  Respiratory status: acceptable  Hydration status: acceptable  Postoperative Nausea and Vomiting: none        No notable events documented.

## 2025-07-08 NOTE — OP NOTE
CYSTOSCOPY, WITH URETERAL STENT INSERTION (B) Operative Note     Date: 2025 - 2025  OR Location: [facility]    Name: Paul Bruno : 1955, Age: 70 y.o., MRN: [id number], Sex: male    Diagnosis  Pre-op Diagnosis      * Pyelonephritis [N12]     * Bilateral ureteral calculi [N20.1] Post-op Diagnosis     * Pyelonephritis [N12]     * Bilateral ureteral calculi [N20.1]     Procedures  CYSTOSCOPY, WITH URETERAL STENT INSERTION  55910 - FL CYSTO W/INSERT URETERAL STENT      Surgeons      * Josr Delcid - Primary    Resident/Fellow/Other Assistant:  Surgeons and Role:     * Bandar Celestin MD - Resident - Assisting    Staff:   Scrub Person: Trudi  Circulator: Mari    Anesthesia Staff: Anesthesiologist: Dwaine Evans MD  CRNA: JAKI Prieto-ROBERTO    Procedure Summary  Anesthesia: General  ASA: III  Estimated Blood Loss: nil mL  Intra-op Medications: Administrations occurring from 1410 to 1510 on 25:  * No intraprocedure medications in log *           Anesthesia Record               Intraprocedure I/O Totals       None           Specimen: No specimens collected              Drains and/or Catheters:   Urethral Catheter Coude 18 Fr. (Active)       Tourniquet Times:         Implants:  Implants       Type Name Action Serial No.      Implant STENT, TRIA URETHERAL, SOFT, 6 X  26 - SNA - [id number] Implanted NA     Implant STENT, TRIA URETHERAL, SOFT, 6 X  26 - SNA - [id number] Implanted NA              Findings: R RPG with filling defect at UPJ. L RPG with no hydro or filling defects. 6x26JJ placed bilaterally without string. Connors replaced at end of case    Indications: Paul Bruno is an 70 y.o. male who is having surgery for Pyelonephritis [N12]  Bilateral ureteral calculi [N20.1].     The patient was seen in the preoperative area. The risks, benefits, complications, treatment options, non-operative alternatives, expected recovery and outcomes were discussed with the patient. The possibilities  of reaction to medication, pulmonary aspiration, injury to surrounding structures, bleeding, recurrent infection, the need for additional procedures, failure to diagnose a condition, and creating a complication requiring transfusion or operation were discussed with the patient. The patient concurred with the proposed plan, giving informed consent.  The site of surgery was properly noted/marked if necessary per policy. The patient has been actively warmed in preoperative area. Preoperative antibiotics have been ordered and given within 1 hours of incision. Venous thrombosis prophylaxis have been ordered including bilateral sequential compression devices    Procedure Details:   The patient was brought to the OR and after good general anesthesia was achieved, the patient was prepped and draped in dorsal lithotomy position. All pressure points were padded.  Surgical pause was performed.  The patient was identified using 2 identifiers.  The correct surgical procedure was confirmed.  All members of surgical and anesthesia team were in agreement.  The patient received prophylactic antibiotic and the procedure started.    Cystoscopy: The patient's bladder was first entered with a 22-Luxembourger rigid cystoscope with 30-degree lens.  Cystoscopic examination showed normal anterior urethra.  The posterior urethra showed an obstructive prostate.  Both ureteral orifices were identified away from the bladder neck.     A sensor wire was placed into the right ureter and into the kidney under flouroscopic guidance. An open ended catheter was advanced to the distal ureter, the wire was removed, and a retrograde as shot. Retrograde showed a filling defect at R UPJ c/w stone location on scan. The wire was replaced, the open ended catheter was removed, and a 6x26 JJ stent was placed with a good curl seen radiographically in the renal pelvis and the bladder on direct visualization.    An identical procedure was performed on the left side. RPG  findings as above. 6x26JJ placed on left as above.     Bladder was then drained and instruments were removed.    The patient tolerated the procedure well and was shifted to recovery in good general condition   Evidence of Infection: No   Complications:  None; patient tolerated the procedure well.    Disposition: PACU - hemodynamically stable.  Condition: stable                 Additional Details:     Attending Attestation: I was present and scrubbed for the entire procedure.    Josr Delcid  Phone Number: [phone]

## 2025-07-08 NOTE — ANESTHESIA PREPROCEDURE EVALUATION
Patient: Paul Bruno    Procedure Information       Date/Time: 07/08/25 1410    Procedure: CYSTOSCOPY, WITH URETERAL STENT INSERTION (Bilateral)    [facility]: [facility]    Surgeons: Josr Delcid MD            Relevant Problems   Cardiac   (+) Atrial flutter (Multi)   (+) Benign essential hypertension   (+) Hypertension      GI   (+) Rectal bleeding      /Renal   (+) BPH (benign prostatic hyperplasia)   (+) Kidney calculi   (+) Prostate cancer (Multi)   (+) Pyelonephritis   (+) Renal calculi      ID   (+) Pyelonephritis       Clinical information reviewed:   Tobacco  Allergies  Meds  Problems  Med Hx  Surg Hx   Fam Hx  Soc   Hx         Medical History[1]   Surgical History[2]  Social History[3]   Current Outpatient Medications   Medication Instructions    amLODIPine (NORVASC) 10 mg, oral, Daily    ascorbic acid, vitamin C, 1,000 mg ER tablet 1 tablet, Daily    aspirin-acetaminophen-caffeine (Excedrin Migraine) 250-250-65 mg tablet 2 tablets, Every 6 hours PRN    cyanocobalamin (VITAMIN B-12) 100 mcg, Daily    ferrous sulfate 325 mg (65 mg elemental) tablet 325 mg of ferrous sulfate, Daily with breakfast    multivitamin with minerals tablet 1 tablet, Daily    oxyCODONE-acetaminophen (Percocet) 5-325 mg tablet 1 tablet, oral, Every 6 hours PRN    polyethylene glycol 3350 (MIRALAX ORAL) 3 times daily PRN    tamsulosin (FLOMAX) 0.8 mg, oral, Daily    TURMERIC ORAL 1,000 mg, Daily    vitamin E acetate (VITAMIN E ORAL) 1 capsule, Daily      RX Allergies[4]     Chemistry    Lab Results   Component Value Date/Time     07/08/2025 0729    K 5.0 07/08/2025 0729     07/08/2025 0729    CO2 23 07/08/2025 0729    BUN 22 07/08/2025 0729    CREATININE 2.21 (H) 07/08/2025 0729    Lab Results   Component Value Date/Time    CALCIUM 9.2 07/08/2025 0729    ALKPHOS 66 07/08/2025 0729    AST 15 07/08/2025 0729    ALT 13 07/08/2025 0729    BILITOT 0.4 07/08/2025 0729          No results found  "for: \"HGBA1C\"  Lab Results   Component Value Date/Time    WBC 36.7 (H) 07/08/2025 0729    WBC 36.7 (H) 07/08/2025 0729    HGB 9.1 (L) 07/08/2025 0729    HGB 9.1 (L) 07/08/2025 0729    HCT 27.8 (L) 07/08/2025 0729    HCT 27.8 (L) 07/08/2025 0729     (H) 07/08/2025 0729     (H) 07/08/2025 0729     Lab Results   Component Value Date/Time    PROTIME 13.4 05/27/2025 1308    INR 1.1 05/27/2025 1308     Encounter Date: 05/27/25   Electrocardiogram, 12-lead PRN ACS symptoms   Result Value    Ventricular Rate 68    Atrial Rate 68    MD Interval 152    QRS Duration 88    QT Interval 404    QTC Calculation(Bazett) 429    P Axis 72    R Axis 50    T Axis 63    QRS Count 11    Q Onset 221    P Onset 145    P Offset 186    T Offset 423    QTC Fredericia 421    Narrative    Normal sinus rhythm  Low voltage QRS  Cannot rule out Anteroseptal infarct (cited on or before 02-JUN-2022)  Abnormal ECG  When compared with ECG of 02-JUN-2022 08:17,  No significant change was found  Confirmed by Torito Colmenares ([id number]) on 5/28/2025 8:09:03 PM      No results found for this or any previous visit from the past 1095 days.    Results for orders placed in visit on 06/14/22    Echocardiogram 6/14/2022:    PHYSICIAN INTERPRETATION:  Left Ventricle: The left ventricular systolic function is hyperdynamic, with an estimated ejection fraction of 70%. There are no regional wall motion abnormalities. The left ventricular cavity size is normal. The left ventricular septal wall thickness is mildly increased. There is mild concentric left ventricular hypertrophy. Abnormal (paradoxical) septal motion, consistent with an intraventricular conduction delay. Left ventricular diastolic filling was indeterminate.  Left Atrium: The left atrium is upper limits of normal in size.  Right Ventricle: The right ventricle is normal in size. There is normal right ventricular global systolic function.  Right Atrium: The right atrium is normal in size.  Aortic " "Valve: The aortic valve is probably trileaflet. There is mild aortic valve cusp calcification. There is trivial aortic valve regurgitation. The peak instantaneous gradient of the aortic valve is 8.6 mmHg.  Mitral Valve: The mitral valve is mildly thickened. There is trace mitral valve regurgitation.  Tricuspid Valve: The tricuspid valve is structurally normal. There is mild tricuspid regurgitation.  Pulmonic Valve: The pulmonic valve is not well visualized. There is trace pulmonic valve regurgitation.  Pericardium: There is a trivial pericardial effusion.  Aorta: The aortic root is normal. There is no dilatation of the aortic arch. There is no dilatation of the ascending aorta. There is no dilatation of the aortic root. There appears to be plaque visualized in the abdominal aorta.  Pulmonary Artery: The tricuspid regurgitant velocity is 2.86 m/s, and with an estimated right atrial pressure of 3 mmHg, the estimated pulmonary artery pressure is mildly elevated with the RVSP at 35.7 mmHg.  Systemic Veins: The inferior vena cava size appears small. There is IVC inspiratory collapse greater than 50%.  In comparison to the previous echocardiogram(s): There are no prior studies on this patient for comparison purposes.      CONCLUSIONS:  1. The left ventricular systolic function is hyperdynamic with a 70% estimated ejection fraction.    Visit Vitals  /61   Pulse 89   Temp 37.4 °C (99.3 °F) (Temporal)   Resp 16   Ht 1.88 m (6' 2.02\")   Wt 54.4 kg (119 lb 14.9 oz)   SpO2 98%   BMI 15.39 kg/m²   Smoking Status Every Day   BSA 1.69 m²     NPO/Void Status  Date of Last Liquid: 07/08/25  Time of Last Liquid: 0900  Date of Last Solid: 07/08/25  Time of Last Solid: 0000        Physical Exam    Airway  Mallampati: II  TM distance: >3 FB  Neck ROM: full  Mouth opening: 3 or more finger widths     Cardiovascular - normal exam  Rhythm: regular  Rate: normal     Dental    Pulmonary - normal exam   Abdominal - normal exam "           Anesthesia Plan    History of general anesthesia?: yes  History of complications of general anesthesia?: no    ASA 3     general   (General with LMA)  intravenous induction   Postoperative pain plan includes opioids.  Trial extubation is planned.  Anesthetic plan and risks discussed with patient.    Plan discussed with CRNA and CAA.              [1]   Past Medical History:  Diagnosis Date    AAA (abdominal aortic aneurysm)     Adverse effect of anesthesia     URINARY RETENTION    BPH (benign prostatic hyperplasia)     Hypertension     Nephrolithiasis     Nephrolithiasis     Personal history of irradiation     RT in 10/27/22, 28 fractions to prostate    Prostate cancer (Multi)     Urinary tract infection    [2]   Past Surgical History:  Procedure Laterality Date    EXTRACORPOREAL SHOCK WAVE LITHOTRIPSY  06/23/2025    PERCUTANEOUS NEPHROSTOMY  05/28/2025    PROSTATE BIOPSY  04/12/2022   [3]   Social History  Tobacco Use    Smoking status: Every Day     Average packs/day: 0.5 packs/day for 46.0 years (23.0 ttl pk-yrs)     Types: Cigars, Cigarettes     Start date: 1971    Smokeless tobacco: Never   Vaping Use    Vaping status: Never Used   Substance Use Topics    Alcohol use: Not Currently     Alcohol/week: 0.0 - 2.0 standard drinks of alcohol    Drug use: Yes     Types: Marijuana     Comment: SMOKE DAILY   [4] No Known Allergies

## 2025-07-08 NOTE — PROGRESS NOTES
07/08/25 0920   Discharge Planning   Living Arrangements Spouse/significant other   Support Systems Spouse/significant other;Family members   Type of Residence Private residence   Who is requesting discharge planning? Provider   Home or Post Acute Services None   Expected Discharge Disposition Home   Financial Resource Strain   How hard is it for you to pay for the very basics like food, housing, medical care, and heating? Not hard   Housing Stability   In the last 12 months, was there a time when you were not able to pay the mortgage or rent on time? N   In the past 12 months, how many times have you moved where you were living? 0   At any time in the past 12 months, were you homeless or living in a shelter (including now)? N   Transportation Needs   In the past 12 months, has lack of transportation kept you from medical appointments or from getting medications? no   In the past 12 months, has lack of transportation kept you from meetings, work, or from getting things needed for daily living? No     Attempted to speak with patient about discharge planning but was unable to reach. Admitted with pyelonephritis with bilateral ureteral calculi on chronic indwelling catheter. Urology consult pending, on IV abx, IV pain pain meds. Anticipate hnn on dc. TCC to continue to follow.

## 2025-07-08 NOTE — CARE PLAN
The patient's goals for the shift include      The clinical goals for the shift include pain management    Problem: Pain - Adult  Goal: Verbalizes/displays adequate comfort level or baseline comfort level  Outcome: Progressing     Problem: Safety - Adult  Goal: Free from fall injury  Outcome: Progressing     Problem: Discharge Planning  Goal: Discharge to home or other facility with appropriate resources  Outcome: Progressing     Problem: Chronic Conditions and Co-morbidities  Goal: Patient's chronic conditions and co-morbidity symptoms are monitored and maintained or improved  Outcome: Progressing     Problem: Nutrition  Goal: Nutrient intake appropriate for maintaining nutritional needs  Outcome: Progressing

## 2025-07-08 NOTE — PERIOPERATIVE NURSING NOTE
1602: Phase 1 care begins  1615: Report sent to room 617 RN  1617: Patient family updated via text message  1641: Pt transported to room 617

## 2025-07-08 NOTE — PROGRESS NOTES
Paul Bruno is a 70 y.o. male on day 1 of admission presenting with Pyelonephritis.      Subjective   Seen and examined at bedside. PAU. Has no new complaints. Still having flank pain, getting another dose of pain meds, has prn pain meds ordered, explained he just has to ask nurse for meds when in pain. No fever, chills, night sweats, N/V or chest pain overnight. Plan for stent placement today.       Objective     Last Recorded Vitals  /63   Pulse 84   Temp 37.5 °C (99.5 °F) (Temporal)   Resp 20   Wt 54.4 kg (120 lb)   SpO2 96%   Intake/Output last 3 Shifts:    Intake/Output Summary (Last 24 hours) at 7/8/2025 1102  Last data filed at 7/8/2025 0955  Gross per 24 hour   Intake 1657.5 ml   Output --   Net 1657.5 ml       Admission Weight  Weight: 54.4 kg (120 lb) (07/07/25 1748)    Daily Weight  07/07/25 : 54.4 kg (120 lb)    Image Results  XR chest 2 views  Narrative: Interpreted By:  Wade Corcoran,   STUDY:  XR CHEST 2 VIEWS;  7/7/2025 9:20 pm      INDICATION:  Signs/Symptoms:SOB/hypoxia.          COMPARISON:  None.      ACCESSION NUMBER(S):  [id number]      ORDERING CLINICIAN:  JAMIE LOPEZ      FINDINGS:          The cardiomediastinal silhouette and pulmonary vasculature are within  normal limits.      No consolidation, pleural effusion or pneumothorax.          Impression: No acute cardiopulmonary process.          MACRO:  None.      Signed by: Wade Corcoran 7/7/2025 9:52 PM  Dictation workstation:   [id number]  CT abdomen pelvis wo IV contrast  Narrative: Interpreted By:  Gio Good,   STUDY:  CT ABDOMEN PELVIS WO IV CONTRAST;  7/7/2025 7:20 pm      INDICATION:  Signs/Symptoms:LEFT FLANK PAIN - HISTORY OF STONES.          COMPARISON:  CT abdomen and pelvis 06/08/2025      ACCESSION NUMBER(S):  [id number]      ORDERING CLINICIAN:  GUADALUPE WEISS      TECHNIQUE:  Contiguous axial images of the abdomen and pelvis were obtained  without intravenous contrast. Coronal and sagittal  reformatted images  were reconstructed from the axial data.      FINDINGS:  Note: The absence of intravenous contrast limits evaluation of the  solid organs and vasculature. Assessment of the abdominopelvic  structures is further limited by paucity of visceral fat and poor  contrast resolution.      LOWER CHEST: Multiple triangular nodularities along the plane of the  right major fissure, at least 1 of which appears slightly increased  in size now measuring 0.5 cm compared to 0.3 cm previously (series  601, image 21). Similar appearance of the 1.6 cm juxtapleural  nodularity along the left hemidiaphragm.      LIVER: No significant parenchymal abnormality.      BILE DUCTS: No significant intrahepatic or extrahepatic dilatation.      GALLBLADDER: No significant abnormality.      PANCREAS: No significant abnormality.      SPLEEN: No significant abnormality.      ADRENALS: No significant abnormality.      KIDNEYS, URETERS, BLADDER: Interval removal of the previously noted  right double-J ureteral stent. 1.1 cm calculus of the right  ureteropelvic junction with moderate right hydroureteronephrosis.  There is mild wall thickening of the right renal pelvis. Numerous  additional nonobstructing right renal calculi are present. Fragmented  staghorn calculus in the left kidney extending into the renal pelvis.  The left ureter is not well visualized however there appears to be 4  mm calculus in the proximal left ureter. There is mild-to-moderate  left hydronephrosis and left perinephric fat stranding.  Circumferential bladder wall thickening. A Connors catheter is present  within the urinary bladder. Thick tip of the catheter tents the dome  of the urinary bladder anteriorly.      REPRODUCTIVE ORGANS: Prostatic calcifications.      GI: Paucity of visceral fat and poor contrast resolution limits  assessment of the bowel. No bowel obstruction. No overt bowel wall  thickening or inflammation within the above limitations.  Moderate  colonic stool burden. The appendix is not visualized.      VESSELS: Infrarenal abdominal aortic aneurysm measuring up to 3.5 cm.  There is mild draping of the posterior wall of the abdominal aorta  along the anterior aspect of the vertebral bodies which may be seen  in the setting of impending rupture. 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 Heavy aortoiliac  vascular calcifications. Vascular patency can not be assessed without  IV contrast.      PERITONEUM/RETROPERITONEUM: No intraperitoneal free air or fluid.      LYMPH NODES: No enlarged lymph nodes.      ABDOMINAL WALL: Mild body wall edema.      OSSEOUS STRUCTURES: No acute osseous abnormality.      Impression: 1. Fragmented staghorn calculus in the left renal pelvis and probable  4 mm calculus in the proximal left ureter. There is moderate left  hydronephrosis and perinephric fat stranding. Correlate for evidence  of pyelonephritis.  2. Interval removal of a right double-J ureteral stent. 1.1 cm  calculus at the right ureteropelvic junction with moderate-to-severe  right hydronephrosis. Mild wall thickening of the right renal pelvis,  correlate for pyelitis.  3. Circumferential bladder wall thickening in the setting of partial  decompression with a Connors catheter. Correlate for cystitis.  4. 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 There is mild  flattening/straightening of the posterior wall of the aorta again see  adjacent vertebral body. This is a finding which may be seen in the  setting of impending rupture. Vascular surgery consultation/follow-up  is recommended. Similar aneurysmal dilatation of the right common  iliac artery.  5. Multiple nodules along the right major fissure, at least 1 of  which appears slightly increased in size. Similar appearance of the  1.6 cm juxtapleural nodularity along the left hemidiaphragm. These  are noted to demonstrate PSMA expression on prior PET-CT.  Consider  follow-up PET-CT or dedicated chest imaging for further assessment.      MACRO:  Critical Finding:  See findings. Notification was initiated on  7/7/2025 at 7:53 pm by  Gio Good.  (**-YCF-**) Instructions:      Signed by: Gio Good 7/7/2025 7:53 PM  Dictation workstation:   [id number]      Physical Exam  Constitutional: A&Ox4, NAD, resting comfortable, underweight male   Head and Face: Atraumatic, normocephalic   Eyes: Normal external exam, EOMI  ENT: Normal external inspection of ears and nose. Oropharynx normal.  Cardiovascular: RRR, S1/S2, no murmurs, rubs, or gallops, radial pulses +2  Pulmonary: CTAB, no respiratory distress, no wheezing, rales or rhonchi, on RA  G/U: L sided CVA tenderness/flank pain, daniel in place  Abdomen: +BS, soft, non-tender, nondistended, no guarding rigidity or rebound tenderness, no masses noted  MSK: Negative for edema, No joint swelling, normal movements of all extremities.   Neuro: No focal deficits, normal motor function, normal sensation, follows all commands  Skin- No lesions, contusions, or erythema.  Psychiatric: Judgment intact. Appropriate mood, affect and behavior   Relevant Results                      This patient has a urinary catheter   Reason for the urinary catheter remaining today? perioperative use for selected surgical procedures          Assessment & Plan  Pyelonephritis    Bilateral ureteral calculi    70 y.o. male with PMHx s/f HTN, BPH, prostate CA s/p XRT, AAA, kidney stones s/p R percutaneous nephrolithotomy with stent placement 5/28 followed by lithotripsy 6/23, Daniel dependent with recent exchange a few days ago presenting with severe right flank pain starting around 3am today.     Pyelonephritis with bilateral ureteral calculi on chronic indwelling catheter, POA  -continue with IV rocephin, pain control  -urine culture pending  - Previous UCX: pan-sensitive proteus and E. Coli, on appropriate antibiotics currently but due to uptrending  leukocytosis will switch to zosyn  -urology consult, NPO, plan for stent placement  -Fever Tmax 38.7 this AM, sepsis criteria met  -BCX being drawn, followed by starting zosyn     Thrombocytosis  Leukocytosis  Platelets 1,062 -> 911 and WBC increased from 20.8 to 36.7 overnight. Previous thrombocytosis seen April 2025, could be reactive to infection especially with being febrile this AM  - CBC with differential pending  - Peripheral smear pending      MAGDALENO with bilateral moderate to severe hydronephrosis 2/2 above  -Baseline serum creatinine: ~0.8-0.9  -Creatinine at admission: 1.43  -IVF: NS 1500 mL  -continue to trend     Acute hypoxia  -CXR, BNP negative  -currently on 3L NC after receiving morphine in ED, wean back to baseline RA as tolerated     HTN  -continue home amlodipine     AAA  -patient unaware of this finding, stable finding compared to prior CT  -follow-up vascular surgery outpatient     Diet: Regular and NPO @ [facility]  DVT Prophylaxis: SCDs, Lovenox   Code Status: Full Code   Case Discussed With: ED provider  Additional Sources Reviewed: ED note day of admission; Urology       Complexity high, anticipate 1-2 additional [facility] stays    Sergo Hawkins DO

## 2025-07-08 NOTE — NURSING NOTE
Upon assisting Paul to change for surgery pt cachectic with thinned skin of sacral coccygeal region. Pt complaint of tenderness. Preventative mepilex placed. Pt placed on side with supportive pillows. Paul and Amy advised of skin precautions, turning q2 HRS and nutrition/hydration importance. Both verbalized understanding and are appreciative. Nutrition consult placed.

## 2025-07-08 NOTE — ED PROVIDER NOTES
HPI     CC: Flank Pain     HPI: Paul Bruno is a 70 y.o. 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  Symptoms started around 3 AM.  He states that his prior kidney stone issues have previously been on the right.  He was doing well until 3 AM when he woke up with severe sharp left-sided flank pain.  This is different than his prior kidney stone related pain which was more achy.  He had associated nausea, dry heaves, and generalized shaking.  He denies any anterior abdominal pain, chest pain, shortness of breath.  He took some Excedrin which did not help.  He has not noticed any dysuria, hematuria, or other urinary changes.  Of note he had a AAA seen on prior CT imaging that he was unaware of.  Patient did start vomiting during our conversation and is complaining of severe left flank pain.    ROS: 10-point review of systems was performed and is otherwise negative except as noted in HPI.    Limitations to history: N/A    Independent Historians: Wife at bedside    External Records Reviewed: Outpatient notes in EMR    Past Medical History: Noncontributory except per HPI     Past Surgical History: Noncontributory except per HPI     Family History: Reviewed and noncontributory     Social History:  Denies tobacco. Denies ETOH. Denies illicit drugs.    Social Determinants Affecting Care: N/A    RX Allergies[1]    Home Meds:   Current Outpatient Medications   Medication Instructions    amLODIPine (NORVASC) 10 mg, oral, Daily    ascorbic acid, vitamin C, 1,000 mg ER tablet 1 tablet, Daily    aspirin-acetaminophen-caffeine (Excedrin Migraine) 250-250-65 mg tablet 2 tablets, Every 6 hours PRN    cyanocobalamin (VITAMIN B-12) 100 mcg, Daily    ferrous sulfate 325 mg (65 mg elemental) tablet 325 mg of ferrous sulfate,  "Daily with breakfast    multivitamin with minerals tablet 1 tablet, Daily    oxyCODONE-acetaminophen (Percocet) 5-325 mg tablet 1 tablet, oral, Every 6 hours PRN    polyethylene glycol 3350 (MIRALAX ORAL) 3 times daily PRN    tamsulosin (FLOMAX) 0.8 mg, oral, Daily    TURMERIC ORAL 1,000 mg, Daily    vitamin E acetate (VITAMIN E ORAL) 1 capsule, Daily        Physical Exam     ED Triage Vitals [07/07/25 1748]   Temperature Heart Rate Respirations BP   36.7 °C (98.1 °F) 81 20 126/79      Pulse Ox Temp Source Heart Rate Source Patient Position   96 % Oral Monitor Sitting      BP Location FiO2 (%)     Left arm --         Heart Rate:  [81]   Temperature:  [36.7 °C (98.1 °F)]   Respirations:  [20]   BP: (126-198)/(79-98)   Height:  [188 cm (6' 2\")]   Weight:  [54.4 kg (120 lb)]   Pulse Ox:  [96 %-97 %]      Physical Exam  Vitals and nursing note reviewed.     CONSTITUTIONAL: Uncomfortable appearing but nontoxic  HENMT: Head atraumatic. Airway patent. Nasal mucosa clear. Mouth with normal mucosa, clear oropharynx. Uvula midline. Neck supple.    EYES: Clear bilaterally, pupils equally round and reactive to light.   CARDIOVASCULAR: Normal rate, regular rhythm.  Heart sounds S1, S2.  No murmurs, rubs or gallops. Normal pulses. Capillary refill < 2 sec.   RESPIRATORY: No increased work of breathing. Breath sounds somewhat diminished right base.  GASTROINTESTINAL: Abdomen soft, non-distended, non-tender. No rebound, no guarding. Normal bowel sounds. No palpable masses, specifically no pulsatile mass.  GENITOURINARY: Severe left CVA tenderness.  MUSCULOSKELETAL: Spine appears normal, range of motion is not limited, no muscle or joint tenderness. No edema.   NEUROLOGICAL: Alert and oriented, no asymmetry, moving all extremities equally.  SKIN: Warm, dry and intact. No rash or notable lesions.  PSYCHIATRIC: Normal mood and affect.  HEME/LYMPH: No adenopathy or splenomegaly.    Diagnostic Results      ECG: ECGs read and interpreted " by me. See ED Course, below, for interpretation.    Labs Reviewed   CBC WITH AUTO DIFFERENTIAL - Abnormal       Result Value    WBC 20.8 (*)     nRBC 0.0      RBC 4.36 (*)     Hemoglobin 10.5 (*)     Hematocrit 32.4 (*)     MCV 74 (*)     MCH 24.1 (*)     MCHC 32.4      RDW 18.6 (*)     Platelets 1,062 (*)     Neutrophils % 86.5      Immature Granulocytes %, Automated 0.6      Lymphocytes % 8.5      Monocytes % 3.6      Eosinophils % 0.4      Basophils % 0.4      Neutrophils Absolute 18.02 (*)     Immature Granulocytes Absolute, Automated 0.12      Lymphocytes Absolute 1.77      Monocytes Absolute 0.76      Eosinophils Absolute 0.09      Basophils Absolute 0.08     BASIC METABOLIC PANEL - Abnormal    Glucose 102 (*)     Sodium 136      Potassium 4.7      Chloride 103      Bicarbonate 24      Anion Gap 14      Urea Nitrogen 15      Creatinine 1.43 (*)     eGFR 52 (*)     Calcium 9.9     B-TYPE NATRIURETIC PEPTIDE - Normal    BNP 64      Narrative:        <100 pg/mL - Heart failure unlikely  100-299 pg/mL - Intermediate probability of acute heart                  failure exacerbation. Correlate with clinical                  context and patient history.    >=300 pg/mL - Heart Failure likely. Correlate with clinical                  context and patient history.    BNP testing is performed using different testing methodology at [facility] than at other [facility] hospitals. Direct result comparisons should only be made within the same method.      SERIAL TROPONIN-INITIAL - Normal    Troponin I, High Sensitivity 6      Narrative:     Less than 99th percentile of normal range cutoff-  Female and children under 18 years old <14 ng/L; Male <21 ng/L: Negative  Repeat testing should be performed if clinically indicated.     Female and children under 18 years old 14-50 ng/L; Male 21-50 ng/L:  Consistent with possible cardiac damage and possible increased clinical   risk. Serial measurements may help to assess extent  of myocardial damage.     >50 ng/L: Consistent with cardiac damage, increased clinical risk and  myocardial infarction. Serial measurements may help assess extent of   myocardial damage.      NOTE: Children less than 1 year old may have higher baseline troponin   levels and results should be interpreted in conjunction with the overall   clinical context.     NOTE: Troponin I testing is performed using a different   testing methodology at [facility] than at other   [facility]. Direct result comparisons should only   be made within the same method.   BLOOD CULTURE   BLOOD CULTURE   URINALYSIS WITH REFLEX MICROSCOPIC   TROPONIN SERIES- (INITIAL, 1 HR)    Narrative:     The following orders were created for panel order Troponin I Series, High Sensitivity (0, 1 HR).  Procedure                               Abnormality         Status                     ---------                               -----------         ------                     Troponin I, High Sensiti...[[id number]]  Normal              Final result               Troponin, High Sensitivi...[[id number]]                                                   Please view results for these tests on the individual orders.   SERIAL TROPONIN, 1 HOUR   CBC   COMPREHENSIVE METABOLIC PANEL   PATH REVIEW-CBC DIFFERENTIAL         XR chest 2 views   Final Result   No acute cardiopulmonary process.             MACRO:   None.        Signed by: Wade Corcoran 7/7/2025 9:52 PM   Dictation workstation:   [id number]      CT abdomen pelvis wo IV contrast   Final Result   1. Fragmented staghorn calculus in the left renal pelvis and probable   4 mm calculus in the proximal left ureter. There is moderate left   hydronephrosis and perinephric fat stranding. Correlate for evidence   of pyelonephritis.   2. Interval removal of a right double-J ureteral stent. 1.1 cm   calculus at the right ureteropelvic junction with moderate-to-severe   right hydronephrosis. Mild wall  thickening of the right renal pelvis,   correlate for pyelitis.   3. Circumferential bladder wall thickening in the setting of partial   decompression with a Connors catheter. Correlate for cystitis.   4. 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 There is mild   flattening/straightening of the posterior wall of the aorta again see   adjacent vertebral body. This is a finding which may be seen in the   setting of impending rupture. Vascular surgery consultation/follow-up   is recommended. 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   5. Multiple nodules along the right major fissure, at least 1 of   which appears slightly increased in size. Similar appearance of the   1.6 cm juxtapleural nodularity along the left hemidiaphragm. These   are noted to demonstrate PSMA expression on prior PET-CT. Consider   follow-up PET-CT or dedicated chest imaging for further assessment.        MACRO:   Critical Finding:  See findings. Notification was initiated on   7/7/2025 at 7:53 pm by  Gio Good.  (**-YCF-**) Instructions:        Signed by: Gio Good 7/7/2025 7:53 PM   Dictation workstation:   [id number]                    No data recorded                Procedure  Procedures    ED Course & MDM   Assessment/Plan:   Paul Bruno is a 70 y.o. male with a history of HTN, BPH, prostate CA s/p XRT, AAA, kidney stones s/p R percutaneous nephrolithotomy with stent placement 5/28 followed by lithotripsy 6/23, Connors dependent with recent exchange a few days ago, who presents with left flank pain with associated dry heaves, nausea, and shaking.  He is hemodynamically stable and generally well-appearing albeit uncomfortable.  Presentation is concerning for possible kidney stone, pyelonephritis, other occult intra-abdominal process.  Workup was initiated by triage provider with labs and CTAP Noncon.  Initial labs notable for CBC with leukocytosis 20.8, mild anemia 10.5, rising platelets of 1062, BMP  with newly elevated creatinine 1.43.  CTAP shows fragmented staghorn calculus in the left renal pelvis and probable 4 mm calculus in the proximal left ureter with moderate left hydronephrosis and perinephric fat stranding correlate for evidence of pyelonephritis.  He also has a 1.1 cm calculus at the right UPJ with moderate to severe right hydronephrosis, mild wall thickening of the right renal pelvis correlate for pyelitis.  Circumferential bladder wall thickening in the setting of partial decompression with Connors catheter.  Infrarenal abdominal aorta stable in size at 3.5 vertebral body concerning for impending rupture.  Also with 1.6 cm juxtapleural nodularity along the left hemidiaphragm and multiple nodules along the right major fissure.  Patient was started empirically on ceftriaxone, given a fluid bolus, morphine, and Zofran for his symptoms.  Urology Dr. Ch was consulted who recommends admission to medicine, n.p.o. at midnight.  Patient subsequently became mildly hypoxic after morphine in the mid 80s.  He was placed on 3 L nasal cannula.  Patient denies any shortness of breath or chest pain.  A chest x-ray was obtained. See below for details of ED course and ultimate disposition.    Medications   cefTRIAXone (Rocephin) 1 g in dextrose (iso) IV 50 mL (1 g intravenous New Bag 7/7/25 2042)   sodium chloride 0.9 % bolus 1,000 mL (1,000 mL intravenous New Bag 7/7/25 2042)   ondansetron (Zofran) injection 4 mg (4 mg intravenous Given 7/7/25 2042)   morphine injection 4 mg (4 mg intravenous Given 7/7/25 2042)        ED Course as of 07/07/25 2249 Mon Jul 07, 2025 2133 Discussed with patient about his AAA.  He was unaware of this although it was seen on prior CT.  I personally reviewed recent CT imaging with the paravertebral flattening seen before.  This is not new.  Patient will need vascular close follow-up. [CG]   2214 Trop/BNP unremarkable [CG]   2215 CXR No acute cardiopulmonary process. [CG]   2249  Patient was accepted for admission by Dr. Ramirez. [CG]      ED Course User Index  [CG] Denise Medrano MD         Diagnoses as of 07/07/25 3577   Pyelonephritis   Bilateral ureteral calculi   Abdominal aortic aneurysm (AAA) without rupture, unspecified part   Hypoxia       Disposition:   Admitted to IM, discussed differential and findings with patient as well as any family members at bedside.      ED Prescriptions    None         Denise Medrano MD  EM/IM/Peds    This note was dictated by speech recognition. Minor errors in transcription may be present.         [1] No Known Allergies       Denise Medrano MD  07/07/25 4364

## 2025-07-08 NOTE — H&P (VIEW-ONLY)
Reason For Consult  Kidney stones with Pyelonephritis    History Of Present Illness  Paul Bruno is a 70 y.o. male presenting with fatigue and  He reports he has not being feeling good really since his procedure for his stones. He recently saw Dr. Willis for percutaneous nephrolithotomy with stent placement 5/28 followed by lithotripsy 6/23. He is daniel dependent. CT scan in ED showed bilaterally hydronephrosis and obstructing kidney stones. UTI positive and concern for pyelonephritis     Past Medical History  He has a past medical history of Adverse effect of anesthesia, BPH (benign prostatic hyperplasia), Hypertension, Nephrolithiasis, Personal history of irradiation, Prostate cancer (Multi), and Urinary tract infection.    He has no past medical history of Myocardial infarction (Multi), Seizures (Multi), or Stroke (Multi).    Surgical History  He has a past surgical history that includes Other surgical history (07/27/2022) and Kidney stone surgery.     Social History  He reports that he has been smoking cigars and cigarettes. He started smoking about 54 years ago. He has a 23 pack-year smoking history. He has never used smokeless tobacco. He reports that he does not currently use alcohol. He reports current drug use. Drug: Marijuana.    Family History  Family History[1]     Allergies  Patient has no known allergies.    Review of Systems  Results for orders placed or performed during the hospital encounter of 07/07/25 (from the past 24 hours)   CBC and Auto Differential   Result Value Ref Range    WBC 20.8 (H) 4.4 - 11.3 x10*3/uL    nRBC 0.0 0.0 - 0.0 /100 WBCs    RBC 4.36 (L) 4.50 - 5.90 x10*6/uL    Hemoglobin 10.5 (L) 13.5 - 17.5 g/dL    Hematocrit 32.4 (L) 41.0 - 52.0 %    MCV 74 (L) 80 - 100 fL    MCH 24.1 (L) 26.0 - 34.0 pg    MCHC 32.4 32.0 - 36.0 g/dL    RDW 18.6 (H) 11.5 - 14.5 %    Platelets 1,062 (H) 150 - 450 x10*3/uL    Neutrophils % 86.5 40.0 - 80.0 %    Immature Granulocytes %, Automated 0.6 0.0 -  0.9 %    Lymphocytes % 8.5 13.0 - 44.0 %    Monocytes % 3.6 2.0 - 10.0 %    Eosinophils % 0.4 0.0 - 6.0 %    Basophils % 0.4 0.0 - 2.0 %    Neutrophils Absolute 18.02 (H) 1.60 - 5.50 x10*3/uL    Immature Granulocytes Absolute, Automated 0.12 0.00 - 0.50 x10*3/uL    Lymphocytes Absolute 1.77 0.80 - 3.00 x10*3/uL    Monocytes Absolute 0.76 0.05 - 0.80 x10*3/uL    Eosinophils Absolute 0.09 0.00 - 0.40 x10*3/uL    Basophils Absolute 0.08 0.00 - 0.10 x10*3/uL   Basic metabolic panel   Result Value Ref Range    Glucose 102 (H) 74 - 99 mg/dL    Sodium 136 136 - 145 mmol/L    Potassium 4.7 3.5 - 5.3 mmol/L    Chloride 103 98 - 107 mmol/L    Bicarbonate 24 21 - 32 mmol/L    Anion Gap 14 10 - 20 mmol/L    Urea Nitrogen 15 6 - 23 mg/dL    Creatinine 1.43 (H) 0.50 - 1.30 mg/dL    eGFR 52 (L) >60 mL/min/1.73m*2    Calcium 9.9 8.6 - 10.3 mg/dL   B-Type Natriuretic Peptide   Result Value Ref Range    BNP 64 0 - 99 pg/mL   Troponin I, High Sensitivity, Initial   Result Value Ref Range    Troponin I, High Sensitivity 6 0 - 20 ng/L   Blood Culture    Specimen: Peripheral Venipuncture; Blood culture   Result Value Ref Range    Blood Culture Loaded on Instrument - Culture in progress    Blood Culture    Specimen: Peripheral Venipuncture; Blood culture   Result Value Ref Range    Blood Culture Loaded on Instrument - Culture in progress    Troponin, High Sensitivity, 1 Hour   Result Value Ref Range    Troponin I, High Sensitivity 7 0 - 20 ng/L   CBC   Result Value Ref Range    WBC 36.7 (H) 4.4 - 11.3 x10*3/uL    nRBC 0.0 0.0 - 0.0 /100 WBCs    RBC 3.76 (L) 4.50 - 5.90 x10*6/uL    Hemoglobin 9.1 (L) 13.5 - 17.5 g/dL    Hematocrit 27.8 (L) 41.0 - 52.0 %    MCV 74 (L) 80 - 100 fL    MCH 24.2 (L) 26.0 - 34.0 pg    MCHC 32.7 32.0 - 36.0 g/dL    RDW 18.4 (H) 11.5 - 14.5 %    Platelets 911 (H) 150 - 450 x10*3/uL   Comprehensive metabolic panel   Result Value Ref Range    Glucose 119 (H) 74 - 99 mg/dL    Sodium 137 136 - 145 mmol/L    Potassium  5.0 3.5 - 5.3 mmol/L    Chloride 107 98 - 107 mmol/L    Bicarbonate 23 21 - 32 mmol/L    Anion Gap 12 10 - 20 mmol/L    Urea Nitrogen 22 6 - 23 mg/dL    Creatinine 2.21 (H) 0.50 - 1.30 mg/dL    eGFR 31 (L) >60 mL/min/1.73m*2    Calcium 9.2 8.6 - 10.3 mg/dL    Albumin 3.0 (L) 3.4 - 5.0 g/dL    Alkaline Phosphatase 66 33 - 136 U/L    Total Protein 6.3 (L) 6.4 - 8.2 g/dL    AST 15 9 - 39 U/L    Bilirubin, Total 0.4 0.0 - 1.2 mg/dL    ALT 13 10 - 52 U/L   Urinalysis with Reflex Microscopic   Result Value Ref Range    Color, Urine Yellow Light-Yellow, Yellow, Dark-Yellow    Appearance, Urine Turbid (N) Clear    Specific Gravity, Urine 1.017 1.005 - 1.035    pH, Urine 8.0 5.0, 5.5, 6.0, 6.5, 7.0, 7.5, 8.0    Protein, Urine 300 (3+) (A) NEGATIVE, 10 (TRACE), 20 (TRACE) mg/dL    Glucose, Urine Normal Normal mg/dL    Blood, Urine 1.0 (3+) (A) NEGATIVE mg/dL    Ketones, Urine NEGATIVE NEGATIVE mg/dL    Bilirubin, Urine NEGATIVE NEGATIVE mg/dL    Urobilinogen, Urine Normal Normal mg/dL    Nitrite, Urine NEGATIVE NEGATIVE    Leukocyte Esterase, Urine 500 Sheryl/uL (A) NEGATIVE   Microscopic Only, Urine   Result Value Ref Range    WBC, Urine >50 (A) 1-5, NONE /HPF    RBC, Urine >20 (A) NONE, 1-2, 3-5 /HPF    Bacteria, Urine 1+ (A) NONE SEEN /HPF    Amorphous Crystals, Urine 2+ NONE, 1+, 2+ /HPF      XR chest 2 views   Final Result   No acute cardiopulmonary process.             MACRO:   None.        Signed by: Wade Corcoran 7/7/2025 9:52 PM   Dictation workstation:   [id number]      CT abdomen pelvis wo IV contrast   Final Result   1. Fragmented staghorn calculus in the left renal pelvis and probable   4 mm calculus in the proximal left ureter. There is moderate left   hydronephrosis and perinephric fat stranding. Correlate for evidence   of pyelonephritis.   2. Interval removal of a right double-J ureteral stent. 1.1 cm   calculus at the right ureteropelvic junction with moderate-to-severe   right hydronephrosis. Mild wall  "thickening of the right renal pelvis,   correlate for pyelitis.   3. Circumferential bladder wall thickening in the setting of partial   decompression with a Connors catheter. Correlate for cystitis.   4. 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 There is mild   flattening/straightening of the posterior wall of the aorta again see   adjacent vertebral body. This is a finding which may be seen in the   setting of impending rupture. Vascular surgery consultation/follow-up   is recommended. 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   5. Multiple nodules along the right major fissure, at least 1 of   which appears slightly increased in size. Similar appearance of the   1.6 cm juxtapleural nodularity along the left hemidiaphragm. These   are noted to demonstrate PSMA expression on prior PET-CT. Consider   follow-up PET-CT or dedicated chest imaging for further assessment.        MACRO:   Critical Finding:  See findings. Notification was initiated on   7/7/2025 at 7:53 pm by  Gio Good.  (**-YCF-**) Instructions:        Signed by: Gio Good 7/7/2025 7:53 PM   Dictation workstation:   [id number]            Physical Exam  PE:  Constitutional: A&Ox3, calm and cooperative  Head/Neck: Neck supple, no JVD  Cardiovascular: RRR  Respiratory/Thorax: Non labored breathing on 2L NC  Gastrointestinal: Abdomen nondistended, soft, nontender  Genitourinary: Connors in place  Musculoskeletal: ROM intact, no joint swelling, normal strength  Extremities: MENCHACA, No peripheral edema  Neurological: No focal deficits   Psychological: Appropriate mood and behavior  Skin: Warm and dry.       Last Recorded Vitals  Blood pressure 108/63, pulse 84, temperature 37.5 °C (99.5 °F), temperature source Temporal, resp. rate 20, height 1.88 m (6' 2\"), weight 54.4 kg (120 lb), SpO2 96%.    Relevant Results  Results for orders placed or performed during the hospital encounter of 07/07/25 (from the past 24 hours)   CBC " and Auto Differential   Result Value Ref Range    WBC 20.8 (H) 4.4 - 11.3 x10*3/uL    nRBC 0.0 0.0 - 0.0 /100 WBCs    RBC 4.36 (L) 4.50 - 5.90 x10*6/uL    Hemoglobin 10.5 (L) 13.5 - 17.5 g/dL    Hematocrit 32.4 (L) 41.0 - 52.0 %    MCV 74 (L) 80 - 100 fL    MCH 24.1 (L) 26.0 - 34.0 pg    MCHC 32.4 32.0 - 36.0 g/dL    RDW 18.6 (H) 11.5 - 14.5 %    Platelets 1,062 (H) 150 - 450 x10*3/uL    Neutrophils % 86.5 40.0 - 80.0 %    Immature Granulocytes %, Automated 0.6 0.0 - 0.9 %    Lymphocytes % 8.5 13.0 - 44.0 %    Monocytes % 3.6 2.0 - 10.0 %    Eosinophils % 0.4 0.0 - 6.0 %    Basophils % 0.4 0.0 - 2.0 %    Neutrophils Absolute 18.02 (H) 1.60 - 5.50 x10*3/uL    Immature Granulocytes Absolute, Automated 0.12 0.00 - 0.50 x10*3/uL    Lymphocytes Absolute 1.77 0.80 - 3.00 x10*3/uL    Monocytes Absolute 0.76 0.05 - 0.80 x10*3/uL    Eosinophils Absolute 0.09 0.00 - 0.40 x10*3/uL    Basophils Absolute 0.08 0.00 - 0.10 x10*3/uL   Basic metabolic panel   Result Value Ref Range    Glucose 102 (H) 74 - 99 mg/dL    Sodium 136 136 - 145 mmol/L    Potassium 4.7 3.5 - 5.3 mmol/L    Chloride 103 98 - 107 mmol/L    Bicarbonate 24 21 - 32 mmol/L    Anion Gap 14 10 - 20 mmol/L    Urea Nitrogen 15 6 - 23 mg/dL    Creatinine 1.43 (H) 0.50 - 1.30 mg/dL    eGFR 52 (L) >60 mL/min/1.73m*2    Calcium 9.9 8.6 - 10.3 mg/dL   B-Type Natriuretic Peptide   Result Value Ref Range    BNP 64 0 - 99 pg/mL   Troponin I, High Sensitivity, Initial   Result Value Ref Range    Troponin I, High Sensitivity 6 0 - 20 ng/L   Blood Culture    Specimen: Peripheral Venipuncture; Blood culture   Result Value Ref Range    Blood Culture Loaded on Instrument - Culture in progress    Blood Culture    Specimen: Peripheral Venipuncture; Blood culture   Result Value Ref Range    Blood Culture Loaded on Instrument - Culture in progress    Troponin, High Sensitivity, 1 Hour   Result Value Ref Range    Troponin I, High Sensitivity 7 0 - 20 ng/L   CBC   Result Value Ref Range     WBC 36.7 (H) 4.4 - 11.3 x10*3/uL    nRBC 0.0 0.0 - 0.0 /100 WBCs    RBC 3.76 (L) 4.50 - 5.90 x10*6/uL    Hemoglobin 9.1 (L) 13.5 - 17.5 g/dL    Hematocrit 27.8 (L) 41.0 - 52.0 %    MCV 74 (L) 80 - 100 fL    MCH 24.2 (L) 26.0 - 34.0 pg    MCHC 32.7 32.0 - 36.0 g/dL    RDW 18.4 (H) 11.5 - 14.5 %    Platelets 911 (H) 150 - 450 x10*3/uL   Comprehensive metabolic panel   Result Value Ref Range    Glucose 119 (H) 74 - 99 mg/dL    Sodium 137 136 - 145 mmol/L    Potassium 5.0 3.5 - 5.3 mmol/L    Chloride 107 98 - 107 mmol/L    Bicarbonate 23 21 - 32 mmol/L    Anion Gap 12 10 - 20 mmol/L    Urea Nitrogen 22 6 - 23 mg/dL    Creatinine 2.21 (H) 0.50 - 1.30 mg/dL    eGFR 31 (L) >60 mL/min/1.73m*2    Calcium 9.2 8.6 - 10.3 mg/dL    Albumin 3.0 (L) 3.4 - 5.0 g/dL    Alkaline Phosphatase 66 33 - 136 U/L    Total Protein 6.3 (L) 6.4 - 8.2 g/dL    AST 15 9 - 39 U/L    Bilirubin, Total 0.4 0.0 - 1.2 mg/dL    ALT 13 10 - 52 U/L   Urinalysis with Reflex Microscopic   Result Value Ref Range    Color, Urine Yellow Light-Yellow, Yellow, Dark-Yellow    Appearance, Urine Turbid (N) Clear    Specific Gravity, Urine 1.017 1.005 - 1.035    pH, Urine 8.0 5.0, 5.5, 6.0, 6.5, 7.0, 7.5, 8.0    Protein, Urine 300 (3+) (A) NEGATIVE, 10 (TRACE), 20 (TRACE) mg/dL    Glucose, Urine Normal Normal mg/dL    Blood, Urine 1.0 (3+) (A) NEGATIVE mg/dL    Ketones, Urine NEGATIVE NEGATIVE mg/dL    Bilirubin, Urine NEGATIVE NEGATIVE mg/dL    Urobilinogen, Urine Normal Normal mg/dL    Nitrite, Urine NEGATIVE NEGATIVE    Leukocyte Esterase, Urine 500 Sheryl/uL (A) NEGATIVE   Microscopic Only, Urine   Result Value Ref Range    WBC, Urine >50 (A) 1-5, NONE /HPF    RBC, Urine >20 (A) NONE, 1-2, 3-5 /HPF    Bacteria, Urine 1+ (A) NONE SEEN /HPF    Amorphous Crystals, Urine 2+ NONE, 1+, 2+ /HPF      XR chest 2 views   Final Result   No acute cardiopulmonary process.             MACRO:   None.        Signed by: Wade Corcoran 7/7/2025 9:52 PM   Dictation workstation:    [id number]      CT abdomen pelvis wo IV contrast   Final Result   1. Fragmented staghorn calculus in the left renal pelvis and probable   4 mm calculus in the proximal left ureter. There is moderate left   hydronephrosis and perinephric fat stranding. Correlate for evidence   of pyelonephritis.   2. Interval removal of a right double-J ureteral stent. 1.1 cm   calculus at the right ureteropelvic junction with moderate-to-severe   right hydronephrosis. Mild wall thickening of the right renal pelvis,   correlate for pyelitis.   3. Circumferential bladder wall thickening in the setting of partial   decompression with a Daniel catheter. Correlate for cystitis.   4. 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 There is mild   flattening/straightening of the posterior wall of the aorta again see   adjacent vertebral body. This is a finding which may be seen in the   setting of impending rupture. Vascular surgery consultation/follow-up   is recommended. 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   5. Multiple nodules along the right major fissure, at least 1 of   which appears slightly increased in size. Similar appearance of the   1.6 cm juxtapleural nodularity along the left hemidiaphragm. These   are noted to demonstrate PSMA expression on prior PET-CT. Consider   follow-up PET-CT or dedicated chest imaging for further assessment.        MACRO:   Critical Finding:  See findings. Notification was initiated on   7/7/2025 at 7:53 pm by  Gio Good.  (**-YCF-**) Instructions:        Signed by: Gio Good 7/7/2025 7:53 PM   Dictation workstation:   [id number]            Assessment/Plan     Plan: Bilateral hydronephrosis with kidney stones  - NPO  - IV ABX for UTI  - IV fluids  - Flomax  - Maintain daniel    Discussed with Dr. Delcid will add to OR for bilateral stents today    I spent 60 minutes in the professional and overall care of this patient.      Ariel Chapman,  NAWAF    --------------    Patient seen, examined. Cr uptrending. Pain manageable.    - discussed CT finding of bilateral ureteral obstruction. Along with uptrending Cr, needs urgent decompression. Plan for cysto, bilateral stent placement today. Following discharge, will follow up with Dr. Willis for ongoing management of stones, retention. R/b/a discussed.   - urinary retention, as above. Will replace sergio daniel with Dr. Willis to discuss management.    Josr Delcid MD             [1]   Family History  Problem Relation Name Age of Onset    Hypertension Other grandparent

## 2025-07-08 NOTE — CONSULTS
Reason For Consult  Kidney stones with Pyelonephritis    History Of Present Illness  Paul Bruno is a 70 y.o. male presenting with fatigue and  He reports he has not being feeling good really since his procedure for his stones. He recently saw Dr. Willis for percutaneous nephrolithotomy with stent placement 5/28 followed by lithotripsy 6/23. He is daniel dependent. CT scan in ED showed bilaterally hydronephrosis and obstructing kidney stones. UTI positive and concern for pyelonephritis     Past Medical History  He has a past medical history of Adverse effect of anesthesia, BPH (benign prostatic hyperplasia), Hypertension, Nephrolithiasis, Personal history of irradiation, Prostate cancer (Multi), and Urinary tract infection.    He has no past medical history of Myocardial infarction (Multi), Seizures (Multi), or Stroke (Multi).    Surgical History  He has a past surgical history that includes Other surgical history (07/27/2022) and Kidney stone surgery.     Social History  He reports that he has been smoking cigars and cigarettes. He started smoking about 54 years ago. He has a 23 pack-year smoking history. He has never used smokeless tobacco. He reports that he does not currently use alcohol. He reports current drug use. Drug: Marijuana.    Family History  Family History[1]     Allergies  Patient has no known allergies.    Review of Systems  Results for orders placed or performed during the hospital encounter of 07/07/25 (from the past 24 hours)   CBC and Auto Differential   Result Value Ref Range    WBC 20.8 (H) 4.4 - 11.3 x10*3/uL    nRBC 0.0 0.0 - 0.0 /100 WBCs    RBC 4.36 (L) 4.50 - 5.90 x10*6/uL    Hemoglobin 10.5 (L) 13.5 - 17.5 g/dL    Hematocrit 32.4 (L) 41.0 - 52.0 %    MCV 74 (L) 80 - 100 fL    MCH 24.1 (L) 26.0 - 34.0 pg    MCHC 32.4 32.0 - 36.0 g/dL    RDW 18.6 (H) 11.5 - 14.5 %    Platelets 1,062 (H) 150 - 450 x10*3/uL    Neutrophils % 86.5 40.0 - 80.0 %    Immature Granulocytes %, Automated 0.6 0.0 -  0.9 %    Lymphocytes % 8.5 13.0 - 44.0 %    Monocytes % 3.6 2.0 - 10.0 %    Eosinophils % 0.4 0.0 - 6.0 %    Basophils % 0.4 0.0 - 2.0 %    Neutrophils Absolute 18.02 (H) 1.60 - 5.50 x10*3/uL    Immature Granulocytes Absolute, Automated 0.12 0.00 - 0.50 x10*3/uL    Lymphocytes Absolute 1.77 0.80 - 3.00 x10*3/uL    Monocytes Absolute 0.76 0.05 - 0.80 x10*3/uL    Eosinophils Absolute 0.09 0.00 - 0.40 x10*3/uL    Basophils Absolute 0.08 0.00 - 0.10 x10*3/uL   Basic metabolic panel   Result Value Ref Range    Glucose 102 (H) 74 - 99 mg/dL    Sodium 136 136 - 145 mmol/L    Potassium 4.7 3.5 - 5.3 mmol/L    Chloride 103 98 - 107 mmol/L    Bicarbonate 24 21 - 32 mmol/L    Anion Gap 14 10 - 20 mmol/L    Urea Nitrogen 15 6 - 23 mg/dL    Creatinine 1.43 (H) 0.50 - 1.30 mg/dL    eGFR 52 (L) >60 mL/min/1.73m*2    Calcium 9.9 8.6 - 10.3 mg/dL   B-Type Natriuretic Peptide   Result Value Ref Range    BNP 64 0 - 99 pg/mL   Troponin I, High Sensitivity, Initial   Result Value Ref Range    Troponin I, High Sensitivity 6 0 - 20 ng/L   Blood Culture    Specimen: Peripheral Venipuncture; Blood culture   Result Value Ref Range    Blood Culture Loaded on Instrument - Culture in progress    Blood Culture    Specimen: Peripheral Venipuncture; Blood culture   Result Value Ref Range    Blood Culture Loaded on Instrument - Culture in progress    Troponin, High Sensitivity, 1 Hour   Result Value Ref Range    Troponin I, High Sensitivity 7 0 - 20 ng/L   CBC   Result Value Ref Range    WBC 36.7 (H) 4.4 - 11.3 x10*3/uL    nRBC 0.0 0.0 - 0.0 /100 WBCs    RBC 3.76 (L) 4.50 - 5.90 x10*6/uL    Hemoglobin 9.1 (L) 13.5 - 17.5 g/dL    Hematocrit 27.8 (L) 41.0 - 52.0 %    MCV 74 (L) 80 - 100 fL    MCH 24.2 (L) 26.0 - 34.0 pg    MCHC 32.7 32.0 - 36.0 g/dL    RDW 18.4 (H) 11.5 - 14.5 %    Platelets 911 (H) 150 - 450 x10*3/uL   Comprehensive metabolic panel   Result Value Ref Range    Glucose 119 (H) 74 - 99 mg/dL    Sodium 137 136 - 145 mmol/L    Potassium  5.0 3.5 - 5.3 mmol/L    Chloride 107 98 - 107 mmol/L    Bicarbonate 23 21 - 32 mmol/L    Anion Gap 12 10 - 20 mmol/L    Urea Nitrogen 22 6 - 23 mg/dL    Creatinine 2.21 (H) 0.50 - 1.30 mg/dL    eGFR 31 (L) >60 mL/min/1.73m*2    Calcium 9.2 8.6 - 10.3 mg/dL    Albumin 3.0 (L) 3.4 - 5.0 g/dL    Alkaline Phosphatase 66 33 - 136 U/L    Total Protein 6.3 (L) 6.4 - 8.2 g/dL    AST 15 9 - 39 U/L    Bilirubin, Total 0.4 0.0 - 1.2 mg/dL    ALT 13 10 - 52 U/L   Urinalysis with Reflex Microscopic   Result Value Ref Range    Color, Urine Yellow Light-Yellow, Yellow, Dark-Yellow    Appearance, Urine Turbid (N) Clear    Specific Gravity, Urine 1.017 1.005 - 1.035    pH, Urine 8.0 5.0, 5.5, 6.0, 6.5, 7.0, 7.5, 8.0    Protein, Urine 300 (3+) (A) NEGATIVE, 10 (TRACE), 20 (TRACE) mg/dL    Glucose, Urine Normal Normal mg/dL    Blood, Urine 1.0 (3+) (A) NEGATIVE mg/dL    Ketones, Urine NEGATIVE NEGATIVE mg/dL    Bilirubin, Urine NEGATIVE NEGATIVE mg/dL    Urobilinogen, Urine Normal Normal mg/dL    Nitrite, Urine NEGATIVE NEGATIVE    Leukocyte Esterase, Urine 500 Sheryl/uL (A) NEGATIVE   Microscopic Only, Urine   Result Value Ref Range    WBC, Urine >50 (A) 1-5, NONE /HPF    RBC, Urine >20 (A) NONE, 1-2, 3-5 /HPF    Bacteria, Urine 1+ (A) NONE SEEN /HPF    Amorphous Crystals, Urine 2+ NONE, 1+, 2+ /HPF      XR chest 2 views   Final Result   No acute cardiopulmonary process.             MACRO:   None.        Signed by: Wade Corcoran 7/7/2025 9:52 PM   Dictation workstation:   [id number]      CT abdomen pelvis wo IV contrast   Final Result   1. Fragmented staghorn calculus in the left renal pelvis and probable   4 mm calculus in the proximal left ureter. There is moderate left   hydronephrosis and perinephric fat stranding. Correlate for evidence   of pyelonephritis.   2. Interval removal of a right double-J ureteral stent. 1.1 cm   calculus at the right ureteropelvic junction with moderate-to-severe   right hydronephrosis. Mild wall  "thickening of the right renal pelvis,   correlate for pyelitis.   3. Circumferential bladder wall thickening in the setting of partial   decompression with a Connors catheter. Correlate for cystitis.   4. 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 There is mild   flattening/straightening of the posterior wall of the aorta again see   adjacent vertebral body. This is a finding which may be seen in the   setting of impending rupture. Vascular surgery consultation/follow-up   is recommended. 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   5. Multiple nodules along the right major fissure, at least 1 of   which appears slightly increased in size. Similar appearance of the   1.6 cm juxtapleural nodularity along the left hemidiaphragm. These   are noted to demonstrate PSMA expression on prior PET-CT. Consider   follow-up PET-CT or dedicated chest imaging for further assessment.        MACRO:   Critical Finding:  See findings. Notification was initiated on   7/7/2025 at 7:53 pm by  Gio Good.  (**-YCF-**) Instructions:        Signed by: Gio Good 7/7/2025 7:53 PM   Dictation workstation:   [id number]            Physical Exam  PE:  Constitutional: A&Ox3, calm and cooperative  Head/Neck: Neck supple, no JVD  Cardiovascular: RRR  Respiratory/Thorax: Non labored breathing on 2L NC  Gastrointestinal: Abdomen nondistended, soft, nontender  Genitourinary: Connors in place  Musculoskeletal: ROM intact, no joint swelling, normal strength  Extremities: MENCHACA, No peripheral edema  Neurological: No focal deficits   Psychological: Appropriate mood and behavior  Skin: Warm and dry.       Last Recorded Vitals  Blood pressure 108/63, pulse 84, temperature 37.5 °C (99.5 °F), temperature source Temporal, resp. rate 20, height 1.88 m (6' 2\"), weight 54.4 kg (120 lb), SpO2 96%.    Relevant Results  Results for orders placed or performed during the hospital encounter of 07/07/25 (from the past 24 hours)   CBC " and Auto Differential   Result Value Ref Range    WBC 20.8 (H) 4.4 - 11.3 x10*3/uL    nRBC 0.0 0.0 - 0.0 /100 WBCs    RBC 4.36 (L) 4.50 - 5.90 x10*6/uL    Hemoglobin 10.5 (L) 13.5 - 17.5 g/dL    Hematocrit 32.4 (L) 41.0 - 52.0 %    MCV 74 (L) 80 - 100 fL    MCH 24.1 (L) 26.0 - 34.0 pg    MCHC 32.4 32.0 - 36.0 g/dL    RDW 18.6 (H) 11.5 - 14.5 %    Platelets 1,062 (H) 150 - 450 x10*3/uL    Neutrophils % 86.5 40.0 - 80.0 %    Immature Granulocytes %, Automated 0.6 0.0 - 0.9 %    Lymphocytes % 8.5 13.0 - 44.0 %    Monocytes % 3.6 2.0 - 10.0 %    Eosinophils % 0.4 0.0 - 6.0 %    Basophils % 0.4 0.0 - 2.0 %    Neutrophils Absolute 18.02 (H) 1.60 - 5.50 x10*3/uL    Immature Granulocytes Absolute, Automated 0.12 0.00 - 0.50 x10*3/uL    Lymphocytes Absolute 1.77 0.80 - 3.00 x10*3/uL    Monocytes Absolute 0.76 0.05 - 0.80 x10*3/uL    Eosinophils Absolute 0.09 0.00 - 0.40 x10*3/uL    Basophils Absolute 0.08 0.00 - 0.10 x10*3/uL   Basic metabolic panel   Result Value Ref Range    Glucose 102 (H) 74 - 99 mg/dL    Sodium 136 136 - 145 mmol/L    Potassium 4.7 3.5 - 5.3 mmol/L    Chloride 103 98 - 107 mmol/L    Bicarbonate 24 21 - 32 mmol/L    Anion Gap 14 10 - 20 mmol/L    Urea Nitrogen 15 6 - 23 mg/dL    Creatinine 1.43 (H) 0.50 - 1.30 mg/dL    eGFR 52 (L) >60 mL/min/1.73m*2    Calcium 9.9 8.6 - 10.3 mg/dL   B-Type Natriuretic Peptide   Result Value Ref Range    BNP 64 0 - 99 pg/mL   Troponin I, High Sensitivity, Initial   Result Value Ref Range    Troponin I, High Sensitivity 6 0 - 20 ng/L   Blood Culture    Specimen: Peripheral Venipuncture; Blood culture   Result Value Ref Range    Blood Culture Loaded on Instrument - Culture in progress    Blood Culture    Specimen: Peripheral Venipuncture; Blood culture   Result Value Ref Range    Blood Culture Loaded on Instrument - Culture in progress    Troponin, High Sensitivity, 1 Hour   Result Value Ref Range    Troponin I, High Sensitivity 7 0 - 20 ng/L   CBC   Result Value Ref Range     WBC 36.7 (H) 4.4 - 11.3 x10*3/uL    nRBC 0.0 0.0 - 0.0 /100 WBCs    RBC 3.76 (L) 4.50 - 5.90 x10*6/uL    Hemoglobin 9.1 (L) 13.5 - 17.5 g/dL    Hematocrit 27.8 (L) 41.0 - 52.0 %    MCV 74 (L) 80 - 100 fL    MCH 24.2 (L) 26.0 - 34.0 pg    MCHC 32.7 32.0 - 36.0 g/dL    RDW 18.4 (H) 11.5 - 14.5 %    Platelets 911 (H) 150 - 450 x10*3/uL   Comprehensive metabolic panel   Result Value Ref Range    Glucose 119 (H) 74 - 99 mg/dL    Sodium 137 136 - 145 mmol/L    Potassium 5.0 3.5 - 5.3 mmol/L    Chloride 107 98 - 107 mmol/L    Bicarbonate 23 21 - 32 mmol/L    Anion Gap 12 10 - 20 mmol/L    Urea Nitrogen 22 6 - 23 mg/dL    Creatinine 2.21 (H) 0.50 - 1.30 mg/dL    eGFR 31 (L) >60 mL/min/1.73m*2    Calcium 9.2 8.6 - 10.3 mg/dL    Albumin 3.0 (L) 3.4 - 5.0 g/dL    Alkaline Phosphatase 66 33 - 136 U/L    Total Protein 6.3 (L) 6.4 - 8.2 g/dL    AST 15 9 - 39 U/L    Bilirubin, Total 0.4 0.0 - 1.2 mg/dL    ALT 13 10 - 52 U/L   Urinalysis with Reflex Microscopic   Result Value Ref Range    Color, Urine Yellow Light-Yellow, Yellow, Dark-Yellow    Appearance, Urine Turbid (N) Clear    Specific Gravity, Urine 1.017 1.005 - 1.035    pH, Urine 8.0 5.0, 5.5, 6.0, 6.5, 7.0, 7.5, 8.0    Protein, Urine 300 (3+) (A) NEGATIVE, 10 (TRACE), 20 (TRACE) mg/dL    Glucose, Urine Normal Normal mg/dL    Blood, Urine 1.0 (3+) (A) NEGATIVE mg/dL    Ketones, Urine NEGATIVE NEGATIVE mg/dL    Bilirubin, Urine NEGATIVE NEGATIVE mg/dL    Urobilinogen, Urine Normal Normal mg/dL    Nitrite, Urine NEGATIVE NEGATIVE    Leukocyte Esterase, Urine 500 Sheryl/uL (A) NEGATIVE   Microscopic Only, Urine   Result Value Ref Range    WBC, Urine >50 (A) 1-5, NONE /HPF    RBC, Urine >20 (A) NONE, 1-2, 3-5 /HPF    Bacteria, Urine 1+ (A) NONE SEEN /HPF    Amorphous Crystals, Urine 2+ NONE, 1+, 2+ /HPF      XR chest 2 views   Final Result   No acute cardiopulmonary process.             MACRO:   None.        Signed by: Wade Corcoran 7/7/2025 9:52 PM   Dictation workstation:    [id number]      CT abdomen pelvis wo IV contrast   Final Result   1. Fragmented staghorn calculus in the left renal pelvis and probable   4 mm calculus in the proximal left ureter. There is moderate left   hydronephrosis and perinephric fat stranding. Correlate for evidence   of pyelonephritis.   2. Interval removal of a right double-J ureteral stent. 1.1 cm   calculus at the right ureteropelvic junction with moderate-to-severe   right hydronephrosis. Mild wall thickening of the right renal pelvis,   correlate for pyelitis.   3. Circumferential bladder wall thickening in the setting of partial   decompression with a Daniel catheter. Correlate for cystitis.   4. 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 There is mild   flattening/straightening of the posterior wall of the aorta again see   adjacent vertebral body. This is a finding which may be seen in the   setting of impending rupture. Vascular surgery consultation/follow-up   is recommended. 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   5. Multiple nodules along the right major fissure, at least 1 of   which appears slightly increased in size. Similar appearance of the   1.6 cm juxtapleural nodularity along the left hemidiaphragm. These   are noted to demonstrate PSMA expression on prior PET-CT. Consider   follow-up PET-CT or dedicated chest imaging for further assessment.        MACRO:   Critical Finding:  See findings. Notification was initiated on   7/7/2025 at 7:53 pm by  Gio Good.  (**-YCF-**) Instructions:        Signed by: Gio Good 7/7/2025 7:53 PM   Dictation workstation:   [id number]            Assessment/Plan     Plan: Bilateral hydronephrosis with kidney stones  - NPO  - IV ABX for UTI  - IV fluids  - Flomax  - Maintain daniel    Discussed with Dr. Delcid will add to OR for bilateral stents today    I spent 60 minutes in the professional and overall care of this patient.      Ariel Chapman,  NAWAF    --------------    Patient seen, examined. Cr uptrending. Pain manageable.    - discussed CT finding of bilateral ureteral obstruction. Along with uptrending Cr, needs urgent decompression. Plan for cysto, bilateral stent placement today. Following discharge, will follow up with Dr. Willis for ongoing management of stones, retention. R/b/a discussed.   - urinary retention, as above. Will replace sergio daniel with Dr. Willis to discuss management.    Josr Delcid MD             [1]   Family History  Problem Relation Name Age of Onset    Hypertension Other grandparent

## 2025-07-09 LAB
ANION GAP SERPL CALC-SCNC: 15 MMOL/L (ref 10–20)
BUN SERPL-MCNC: 30 MG/DL (ref 6–23)
CALCIUM SERPL-MCNC: 9.6 MG/DL (ref 8.6–10.3)
CHLORIDE SERPL-SCNC: 105 MMOL/L (ref 98–107)
CO2 SERPL-SCNC: 23 MMOL/L (ref 21–32)
CREAT SERPL-MCNC: 2.02 MG/DL (ref 0.5–1.3)
EGFRCR SERPLBLD CKD-EPI 2021: 34 ML/MIN/1.73M*2
ERYTHROCYTE [DISTWIDTH] IN BLOOD BY AUTOMATED COUNT: 18.4 % (ref 11.5–14.5)
GLUCOSE SERPL-MCNC: 107 MG/DL (ref 74–99)
HCT VFR BLD AUTO: 27.8 % (ref 41–52)
HGB BLD-MCNC: 9 G/DL (ref 13.5–17.5)
MCH RBC QN AUTO: 24.7 PG (ref 26–34)
MCHC RBC AUTO-ENTMCNC: 32.4 G/DL (ref 32–36)
MCV RBC AUTO: 76 FL (ref 80–100)
NRBC BLD-RTO: 0 /100 WBCS (ref 0–0)
PLATELET # BLD AUTO: 834 X10*3/UL (ref 150–450)
POTASSIUM SERPL-SCNC: 4.9 MMOL/L (ref 3.5–5.3)
RBC # BLD AUTO: 3.65 X10*6/UL (ref 4.5–5.9)
SODIUM SERPL-SCNC: 138 MMOL/L (ref 136–145)
WBC # BLD AUTO: 28.2 X10*3/UL (ref 4.4–11.3)

## 2025-07-09 PROCEDURE — 85027 COMPLETE CBC AUTOMATED: CPT

## 2025-07-09 PROCEDURE — 99233 SBSQ HOSP IP/OBS HIGH 50: CPT | Performed by: INTERNAL MEDICINE

## 2025-07-09 PROCEDURE — 80048 BASIC METABOLIC PNL TOTAL CA: CPT

## 2025-07-09 PROCEDURE — 2500000001 HC RX 250 WO HCPCS SELF ADMINISTERED DRUGS (ALT 637 FOR MEDICARE OP)

## 2025-07-09 PROCEDURE — 36415 COLL VENOUS BLD VENIPUNCTURE: CPT

## 2025-07-09 PROCEDURE — 2500000004 HC RX 250 GENERAL PHARMACY W/ HCPCS (ALT 636 FOR OP/ED)

## 2025-07-09 PROCEDURE — 1200000002 HC GENERAL ROOM WITH TELEMETRY DAILY

## 2025-07-09 RX ORDER — ENOXAPARIN SODIUM 100 MG/ML
30 INJECTION SUBCUTANEOUS EVERY 24 HOURS
Status: DISCONTINUED | OUTPATIENT
Start: 2025-07-09 | End: 2025-07-11 | Stop reason: HOSPADM

## 2025-07-09 RX ADMIN — MORPHINE SULFATE 4 MG: 4 INJECTION, SOLUTION INTRAMUSCULAR; INTRAVENOUS at 11:01

## 2025-07-09 RX ADMIN — PIPERACILLIN SODIUM AND TAZOBACTAM SODIUM 2.25 G: 2; .25 INJECTION, SOLUTION INTRAVENOUS at 20:44

## 2025-07-09 RX ADMIN — MORPHINE SULFATE 4 MG: 4 INJECTION, SOLUTION INTRAMUSCULAR; INTRAVENOUS at 20:02

## 2025-07-09 RX ADMIN — PIPERACILLIN SODIUM AND TAZOBACTAM SODIUM 3.38 G: 3; .375 INJECTION, SOLUTION INTRAVENOUS at 01:57

## 2025-07-09 RX ADMIN — AMLODIPINE BESYLATE 10 MG: 10 TABLET ORAL at 21:58

## 2025-07-09 RX ADMIN — FERROUS SULFATE TAB 325 MG (65 MG ELEMENTAL FE) 1 TABLET: 325 (65 FE) TAB at 08:25

## 2025-07-09 RX ADMIN — PIPERACILLIN SODIUM AND TAZOBACTAM SODIUM 2.25 G: 2; .25 INJECTION, SOLUTION INTRAVENOUS at 09:33

## 2025-07-09 RX ADMIN — MORPHINE SULFATE 4 MG: 4 INJECTION, SOLUTION INTRAMUSCULAR; INTRAVENOUS at 06:37

## 2025-07-09 RX ADMIN — ENOXAPARIN SODIUM 30 MG: 30 INJECTION SUBCUTANEOUS at 08:25

## 2025-07-09 RX ADMIN — PIPERACILLIN SODIUM AND TAZOBACTAM SODIUM 2.25 G: 2; .25 INJECTION, SOLUTION INTRAVENOUS at 13:31

## 2025-07-09 ASSESSMENT — COGNITIVE AND FUNCTIONAL STATUS - GENERAL
MOBILITY SCORE: 24
DAILY ACTIVITIY SCORE: 24
MOBILITY SCORE: 24
DAILY ACTIVITIY SCORE: 24

## 2025-07-09 ASSESSMENT — PAIN SCALES - GENERAL
PAINLEVEL_OUTOF10: 3
PAINLEVEL_OUTOF10: 8
PAINLEVEL_OUTOF10: 9
PAINLEVEL_OUTOF10: 3
PAINLEVEL_OUTOF10: 8
PAINLEVEL_OUTOF10: 3

## 2025-07-09 ASSESSMENT — PAIN - FUNCTIONAL ASSESSMENT
PAIN_FUNCTIONAL_ASSESSMENT: 0-10

## 2025-07-09 ASSESSMENT — PAIN DESCRIPTION - DESCRIPTORS
DESCRIPTORS: ACHING;DISCOMFORT
DESCRIPTORS: ACHING;CRAMPING;DISCOMFORT
DESCRIPTORS: ACHING;DISCOMFORT
DESCRIPTORS: ACHING;DISCOMFORT

## 2025-07-09 ASSESSMENT — PAIN DESCRIPTION - LOCATION
LOCATION: ABDOMEN

## 2025-07-09 ASSESSMENT — PAIN DESCRIPTION - ORIENTATION
ORIENTATION: LEFT;ANTERIOR
ORIENTATION: LEFT

## 2025-07-09 NOTE — CARE PLAN
The patient's goals for the shift include safety    The clinical goals for the shift include monitor vital signs    Over the shift, the patient did not make progress toward the following goals. Barriers to progression include education. Recommendations to address these barriers include reinforcement.

## 2025-07-09 NOTE — CONSULTS
''Infectious Disease Consult Note''        Referred by Dr Stevens  Reason For Consult: urosepsis        History Of Present Illness:  Patient is 70-year-old male with history of hypertension, BPH s/p chronic Connors, prostate cancer s/p XRT, AAA, nephrolithiasis s/p right percutaneous nephrolithotomy with stent placement 5/28 followed by lithotripsy 6/23/25, admitted on 7/7 with severe flank pain.  On admission he was afebrile.  His WBC was 20K and creatinine 1.43.  CT showed fragmented staghorn calculus left ureter with moderate left hydronephrosis and perinephric fat stranding, moderate to severe right hydronephrosis, bladder wall thickening s/p cystoscopy with insertion of ureteral stent on 7/8.  His blood culture is growing GNB.  ID is consulted for antibiotic management.    Current Antibiotic:  Zosyn    Medications:  Medications Ordered Prior to Encounter[1]  Medical History[2]  Surgical History[3]  Social History     Socioeconomic History    Marital status: Significant Other     Spouse name: Not on file    Number of children: Not on file    Years of education: Not on file    Highest education level: Not on file   Occupational History    Not on file   Tobacco Use    Smoking status: Every Day     Average packs/day: 0.5 packs/day for 46.0 years (23.0 ttl pk-yrs)     Types: Cigars, Cigarettes     Start date: 1971    Smokeless tobacco: Never   Vaping Use    Vaping status: Never Used   Substance and Sexual Activity    Alcohol use: Not Currently     Alcohol/week: 0.0 - 2.0 standard drinks of alcohol    Drug use: Yes     Types: Marijuana     Comment: SMOKE DAILY    Sexual activity: Defer   Other Topics Concern    Not on file   Social History Narrative    Not on file     Social Drivers of Health     Financial Resource Strain: Low Risk  (7/8/2025)    Overall Financial Resource Strain (CARDI)     Difficulty of Paying  Living Expenses: Not hard at all   Food Insecurity: No Food Insecurity (7/8/2025)    Hunger Vital Sign     Worried About Running Out of Food in the Last Year: Never true     Ran Out of Food in the Last Year: Never true   Transportation Needs: No Transportation Needs (7/8/2025)    PRAPARE - Transportation     Lack of Transportation (Medical): No     Lack of Transportation (Non-Medical): No   Physical Activity: Inactive (7/8/2025)    Exercise Vital Sign     Days of Exercise per Week: 0 days     Minutes of Exercise per Session: 0 min   Stress: No Stress Concern Present (5/27/2025)    [employer] [place] [employer] - Occupational Stress Questionnaire     Feeling of Stress : Only a little   Social Connections: Unknown (5/27/2025)    Social Connection and Isolation Panel [NHANES]     Frequency of Communication with Friends and Family: Three times a week     Frequency of Social Gatherings with Friends and Family: Three times a week     Attends [employer] Services: Patient declined     Active Member of Clubs or Organizations: Patient declined     Attends Club or Organization Meetings: Patient declined     Marital Status: Living with partner   Intimate Partner Violence: Not At Risk (7/8/2025)    Humiliation, Afraid, Rape, and Kick questionnaire     Fear of Current or Ex-Partner: No     Emotionally Abused: No     Physically Abused: No     Sexually Abused: No   Housing Stability: Low Risk  (7/8/2025)    Housing Stability Vital Sign     Unable to Pay for Housing in the Last Year: No     Number of Times Moved in the Last Year: 0     Homeless in the Last Year: No     Family History[4]  Allergies[5]      Review of Systems:   General: no fevers, chills  Skin: no rashes or wounds  Head: no headache  ENT: no sore throat   Chest: no chest pain   Resp: no cough, or sob  GI: no nausea, vomiting, or diarrhea   : + Retention and flank pain  Ext: no edema       Physical Exam:  /68 (BP Location: Right arm, Patient Position:  "Lying)   Pulse 80   Temp 36.8 °C (98.2 °F) (Temporal)   Resp 16   Ht 1.88 m (6' 2.02\")   Wt 54.4 kg (119 lb 14.9 oz)   SpO2 94%   BMI 15.39 kg/m²   General: NAD, nontoxic appearing  Skin: no rashes or wounds  Eyes: no scleral icterus  ENT: no oral thrush or lesions  Resp: lungs CTA b/l  CV:  normal S1/S2, no murmur   Abd: soft, non-tender  Back: no CVA tenderness   Ext: no edema  MSK: good ROM  Neuro: AAOx3       Lab:  Lab Results   Component Value Date    WBC 36.7 (H) 07/08/2025    WBC 36.7 (H) 07/08/2025    HGB 9.1 (L) 07/08/2025    HGB 9.1 (L) 07/08/2025    HCT 27.8 (L) 07/08/2025    HCT 27.8 (L) 07/08/2025    MCV 74 (L) 07/08/2025    MCV 74 (L) 07/08/2025     (H) 07/08/2025     (H) 07/08/2025      Results from last 72 hours   Lab Units 07/08/25  0729   SODIUM mmol/L 137   POTASSIUM mmol/L 5.0   CHLORIDE mmol/L 107   CO2 mmol/L 23   BUN mg/dL 22   CREATININE mg/dL 2.21*   GLUCOSE mg/dL 119*   CALCIUM mg/dL 9.2   ANION GAP mmol/L 12   EGFR mL/min/1.73m*2 31*     Results from last 72 hours   Lab Units 07/08/25  0729   ALK PHOS U/L 66   BILIRUBIN TOTAL mg/dL 0.4   PROTEIN TOTAL g/dL 6.3*   ALT U/L 13   AST U/L 15   ALBUMIN g/dL 3.0*     Estimated Creatinine Clearance: 23.2 mL/min (A) (by C-G formula based on SCr of 2.21 mg/dL (H)).  C-Reactive Protein   Date/Time Value Ref Range Status   07/08/2025 11:40 AM 11.31 (H) <1.00 mg/dL Final     Sedimentation Rate   Date/Time Value Ref Range Status   07/08/2025 07:29 AM 66 (H) 0 - 20 mm/h Final     No results found for: \"HIV1X2\", \"HIVCONF\", \"SZFLPX2KJ\"  No results found for: \"HCVPCRQUANT\"      Cultures/Micro:  7/7 blood culture: GNB  7/8 blood culture: IP       Imaging: reviewed       Assessment:  Patient is 70-year-old male with history of hypertension, prostate cancer s/p XRT, AAA, admitted on 7/7 with severe flank pain.      - Septicemia with urinary symptoms  - Obstructive uropathy d/t  staghorn calculus left ureter s/p cystoscopy and stent placed " 7/8  -Complicated UTI  -Bilateral hydronephrosis  - Leukocytosis  - MAGDALENO  - BPH s/p chronic Connors  - History of recurrent UTIs  -Nephrolithiasis s/p right percutaneous nephrolithotomy with stent placement 5/28 followed by lithotripsy 6/23/25      Plan/Recommendations:  - Continue Zosyn  - Follow blood and urine culture   -Trend WBC and creatinine  - Follow urology recommendation  - He may benefit from long term suppression antibiotic as UTI PPx  - Monitoring for adverse effect of antibiotic such as diarrhea or rash      Please call ID with any concerns or questions.   Discussed with patient and his wife.    Wu Huber MD  [facility]  #[phone]           [1]   No current facility-administered medications on file prior to encounter.     Current Outpatient Medications on File Prior to Encounter   Medication Sig Dispense Refill    amLODIPine (Norvasc) 10 mg tablet Take 1 tablet (10 mg) by mouth once daily. (Patient taking differently: Take 1 tablet (10 mg) by mouth once daily in the evening.) 90 tablet 3    ascorbic acid, vitamin C, 1,000 mg ER tablet Take 1 tablet (1,000 mg) by mouth once daily.      aspirin-acetaminophen-caffeine (Excedrin Migraine) 250-250-65 mg tablet Take 2 tablets by mouth every 6 hours if needed for headaches or mild pain (1 - 3).      cyanocobalamin (Vitamin B-12) 100 mcg tablet Take 1 tablet (100 mcg) by mouth once daily.      ferrous sulfate 325 mg (65 mg elemental) tablet Take 1 tablet by mouth once daily with breakfast.      multivitamin with minerals tablet Take 1 tablet by mouth once daily.      oxyCODONE-acetaminophen (Percocet) 5-325 mg tablet Take 1 tablet by mouth every 6 hours if needed for severe pain (7 - 10). 15 tablet 0    polyethylene glycol 3350 (MIRALAX ORAL) Take by mouth 3 times a day as needed. Gummy      tamsulosin (Flomax) 0.4 mg 24 hr capsule Take 2 capsules (0.8 mg) by mouth once daily. (Patient taking differently: Take 2 capsules (0.8 mg) by mouth  once daily in the morning.) 60 capsule 2    TURMERIC ORAL Take 1,000 mg by mouth once daily.      vitamin E acetate (VITAMIN E ORAL) Take 1 capsule by mouth once daily. vitamin E 180 mg oral capsule     [2]   Past Medical History:  Diagnosis Date    AAA (abdominal aortic aneurysm)     Adverse effect of anesthesia     URINARY RETENTION    BPH (benign prostatic hyperplasia)     Hypertension     Nephrolithiasis     Nephrolithiasis     Personal history of irradiation     RT in 10/27/22, 28 fractions to prostate    Prostate cancer (Multi)     Urinary tract infection    [3]   Past Surgical History:  Procedure Laterality Date    EXTRACORPOREAL SHOCK WAVE LITHOTRIPSY  06/23/2025    PERCUTANEOUS NEPHROSTOMY  05/28/2025    PROSTATE BIOPSY  04/12/2022    TONSILLECTOMY     [4]   Family History  Problem Relation Name Age of Onset    Hypertension Other grandparent    [5] No Known Allergies

## 2025-07-09 NOTE — CARE PLAN
The patient's goals for the shift include Maintain safety and control pain with alternative means    The clinical goals for the shift include Pain management

## 2025-07-09 NOTE — PROGRESS NOTES
"Paul Bruno is a 70 y.o. male on day 2 of admission presenting with Pyelonephritis.    Subjective   Patient is awake sitting by the windows this morning. He reports he s feeling a lt better and more like himself today       Objective     Physical Exam  Constitutional:       Appearance: Normal appearance.   Cardiovascular:      Rate and Rhythm: Normal rate and regular rhythm.   Pulmonary:      Effort: Pulmonary effort is normal.      Comments: Non labored breathing on RA  Genitourinary:     Comments: Connors in place with clear yellow urine  Skin:     General: Skin is warm and dry.   Neurological:      General: No focal deficit present.      Mental Status: He is alert and oriented to person, place, and time. Mental status is at baseline.   Psychiatric:         Attention and Perception: Attention normal.         Mood and Affect: Mood normal.         Speech: Speech normal.         Behavior: Behavior normal.         Cognition and Memory: Cognition normal.         Last Recorded Vitals  Blood pressure 116/68, pulse 66, temperature 36.8 °C (98.2 °F), temperature source Temporal, resp. rate 15, height 1.88 m (6' 2.02\"), weight 54.4 kg (119 lb 14.9 oz), SpO2 94%.  Intake/Output last 3 Shifts:  I/O last 3 completed shifts:  In: 2513.5 (46.2 mL/kg) [I.V.:863.5 (15.9 mL/kg); IV Piggyback:1650]  Out: 1450 (26.7 mL/kg) [Urine:1450 (0.7 mL/kg/hr)]  Weight: 54.4 kg     Relevant Results  Scheduled medications  Scheduled Medications[1]  Continuous medications  Continuous Medications[2]  PRN medications  PRN Medications[3]   Results for orders placed or performed during the hospital encounter of 07/07/25 (from the past 24 hours)   Procalcitonin   Result Value Ref Range    Procalcitonin 25.11 (H) <=0.07 ng/mL   C-reactive protein   Result Value Ref Range    C-Reactive Protein 11.31 (H) <1.00 mg/dL   Blood Culture    Specimen: Peripheral Venipuncture; Blood culture   Result Value Ref Range    Blood Culture Loaded on Instrument - " Culture in progress    Blood Culture    Specimen: Peripheral Venipuncture; Blood culture   Result Value Ref Range    Blood Culture Loaded on Instrument - Culture in progress    CBC   Result Value Ref Range    WBC 28.2 (H) 4.4 - 11.3 x10*3/uL    nRBC 0.0 0.0 - 0.0 /100 WBCs    RBC 3.65 (L) 4.50 - 5.90 x10*6/uL    Hemoglobin 9.0 (L) 13.5 - 17.5 g/dL    Hematocrit 27.8 (L) 41.0 - 52.0 %    MCV 76 (L) 80 - 100 fL    MCH 24.7 (L) 26.0 - 34.0 pg    MCHC 32.4 32.0 - 36.0 g/dL    RDW 18.4 (H) 11.5 - 14.5 %    Platelets 834 (H) 150 - 450 x10*3/uL   Basic Metabolic Panel   Result Value Ref Range    Glucose 107 (H) 74 - 99 mg/dL    Sodium 138 136 - 145 mmol/L    Potassium 4.9 3.5 - 5.3 mmol/L    Chloride 105 98 - 107 mmol/L    Bicarbonate 23 21 - 32 mmol/L    Anion Gap 15 10 - 20 mmol/L    Urea Nitrogen 30 (H) 6 - 23 mg/dL    Creatinine 2.02 (H) 0.50 - 1.30 mg/dL    eGFR 34 (L) >60 mL/min/1.73m*2    Calcium 9.6 8.6 - 10.3 mg/dL      FL less than 1 hour   Final Result      XR chest 2 views   Final Result   No acute cardiopulmonary process.             MACRO:   None.        Signed by: Wade Corcoran 7/7/2025 9:52 PM   Dictation workstation:   [id number]      CT abdomen pelvis wo IV contrast   Final Result   1. Fragmented staghorn calculus in the left renal pelvis and probable   4 mm calculus in the proximal left ureter. There is moderate left   hydronephrosis and perinephric fat stranding. Correlate for evidence   of pyelonephritis.   2. Interval removal of a right double-J ureteral stent. 1.1 cm   calculus at the right ureteropelvic junction with moderate-to-severe   right hydronephrosis. Mild wall thickening of the right renal pelvis,   correlate for pyelitis.   3. Circumferential bladder wall thickening in the setting of partial   decompression with a Connors catheter. Correlate for cystitis.   4. 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 There is mild   flattening/straightening of the posterior wall of the aorta  again see   adjacent vertebral body. This is a finding which may be seen in the   setting of impending rupture. Vascular surgery consultation/follow-up   is recommended. 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   5. Multiple nodules along the right major fissure, at least 1 of   which appears slightly increased in size. Similar appearance of the   1.6 cm juxtapleural nodularity along the left hemidiaphragm. These   are noted to demonstrate PSMA expression on prior PET-CT. Consider   follow-up PET-CT or dedicated chest imaging for further assessment.        MACRO:   Critical Finding:  See findings. Notification was initiated on   7/7/2025 at 7:53 pm by  Gio Good.  (**-YCF-**) Instructions:        Signed by: Gio Good 7/7/2025 7:53 PM   Dictation workstation:   [id number]                        Assessment & Plan  Pyelonephritis    Bilateral ureteral calculi    Plan: POD 1 CYSTOSCOPY, WITH URETERAL STENT INSERTION Findings: R RPG with filling defect at UPJ. L RPG with no hydro or filling defects. 6x26JJ placed bilaterally without string. Daniel replaced at end of case   - Regular diet   - PRN pain control  - PRN antiemetic   - Encourage OOB  - Encourage IS  - DVT ppx: SCDs/ Lovenox  - Maintain Daniel for chronic retention  - IV ABX     Continue to trend labs. Anticipate discharge tomorrow if WBC and Cr responding well. May need long term prophylactic abx for UTI prevention given chronic daniel.      I spent 35 minutes in the professional and overall care of this patient.      Ariel Chapman PA-C         [1] amLODIPine, 10 mg, oral, q PM  enoxaparin, 30 mg, subcutaneous, q24h  ferrous sulfate, 1 tablet, oral, Daily with breakfast  pantoprazole, 40 mg, oral, Daily before breakfast   Or  pantoprazole, 40 mg, intravenous, Daily before breakfast  piperacillin-tazobactam, 2.25 g, intravenous, q6h  [2]    [3] PRN medications: [Transfer Hold] acetaminophen, benzocaine-menthol,  dextromethorphan-guaifenesin, [Transfer Hold] HYDROmorphone, melatonin, morphine, morphine, ondansetron, polyethylene glycol

## 2025-07-09 NOTE — CARE PLAN
The patient's goals for the shift include rest and be ready for tomorrow    The clinical goals for the shift include maintain safety and be free from falls

## 2025-07-09 NOTE — DISCHARGE INSTRUCTIONS
Stent Placement:  DESCRIPTION OF PROCEDURE:  - Cystoscopy involves looking at the ureter and upper kidney with a small, medical telescope. It may be performed in order to clear the urinary tract of stones or take a biopsy of tissue. A small plastic tube called a stent was placed in the ureter. It helps drain urine from your kidney to your bladder. The stent is NOT PERMANENT and MUST BE REMOVED otherwise, permanent kidney damage can occur.     ACTIVITY :  - Today: Light to moderate activity.   - Do not drive or operate heavy machinery while taking narcotic medication or if you have discomfort that impairs your ability to make sudden movements.   - DIET - You may resume a normal diet and make sure to drink plenty of fluids.     SHOWERING AND BATHING:  It is OK to shower 48 hours after the surgery.     SIGNS and SYMPTOMS - EXPECTED:  - Dull, achy pain on the side worked on.   - Blood in the urine with passage of small clots.   - Urine may clear up and turn red a few days later.   - Burning with urination for a few days.   - Discomfort during urination may occur.   - Bladder spasms may occur in your lower abdomen at anytime.

## 2025-07-09 NOTE — PROGRESS NOTES
07/09/25 1416   Discharge Planning   Assistance Needed POD 1 CYSTOSCOPY, WITH URETERAL STENT INSERTION. ID following for septicemia/UTI. Currently on IV zosyn- urine and blood cultures pending. TCC to continue to follow for ID recs.   Expected Discharge Disposition Home     Addendum 1434- I met with patient and SO Amy at bedside. Patient has chronic daniel, he cares for at home.  Denies any hhc needs at this time. Amy will transport home on dc. TCC to continue to follow.

## 2025-07-09 NOTE — PROGRESS NOTES
Paul Bruno is a 70 y.o. male on day 2 of admission presenting with Pyelonephritis.      Subjective   On room air, afebrile, no overnight. Tolerating PO intake.        Objective     Last Recorded Vitals  /68 (BP Location: Right arm, Patient Position: Lying)   Pulse 66   Temp 36.8 °C (98.2 °F) (Temporal)   Resp 15   Wt 54.4 kg (119 lb 14.9 oz)   SpO2 94%   Intake/Output last 3 Shifts:    Intake/Output Summary (Last 24 hours) at 7/9/2025 0902  Last data filed at 7/9/2025 0500  Gross per 24 hour   Intake 963.5 ml   Output 1450 ml   Net -486.5 ml       Admission Weight  Weight: 54.4 kg (120 lb) (07/07/25 1748)    Daily Weight  07/08/25 : 54.4 kg (119 lb 14.9 oz)    Image Results  FL less than 1 hour  These images are not reportable by radiology and will not be interpreted   by  Radiologists.      Physical Exam  Constitutional: A&Ox4, NAD, resting comfortable, underweight male   Head and Face: Atraumatic, normocephalic   Eyes: Normal external exam, EOMI  ENT: Normal external inspection of ears and nose. Oropharynx normal.  Cardiovascular: RRR, S1/S2, no murmurs, rubs, or gallops, radial pulses +2  Pulmonary: CTAB, no respiratory distress, no wheezing, rales or rhonchi, on RA  G/U: L sided CVA tenderness/flank pain, daniel in place  Abdomen: +BS, soft, non-tender, nondistended, no guarding rigidity or rebound tenderness, no masses noted  MSK: Negative for edema, No joint swelling, normal movements of all extremities.   Neuro: No focal deficits, normal motor function, normal sensation, follows all commands  Skin- No lesions, contusions, or erythema.  Psychiatric: Judgment intact. Appropriate mood, affect and behavior   Relevant Results           This patient has a urinary catheter   Reason for the urinary catheter remaining today? perioperative use for selected surgical procedures          Assessment & Plan  Pyelonephritis    Bilateral ureteral calculi      7/9:  S/p b/l stent placement yesterday... 2nd R  stent, 1st L stent... f/up . Primary  doc is Dr Willis. Cont indwelling daniel.     Pyelo, bacteremia with GNB... cont iv abx, f/up ID.     Hx AAA... OP monitoring.     Home regimen for chronic conditions  Dvt px with lovenox.         7/8:    70 y.o. male with PMHx s/f HTN, BPH, prostate CA s/p XRT, AAA, kidney stones s/p R percutaneous nephrolithotomy with stent placement 5/28 followed by lithotripsy 6/23, Daniel dependent with recent exchange a few days ago presenting with severe right flank pain starting around 3am today.     Pyelonephritis with bilateral ureteral calculi on chronic indwelling catheter, POA  -continue with IV rocephin, pain control  -urine culture pending  - Previous UCX: pan-sensitive proteus and E. Coli, on appropriate antibiotics currently but due to uptrending leukocytosis will switch to zosyn  -urology consult, NPO, plan for stent placement  -Fever Tmax 38.7 this AM, sepsis criteria met  -BCX being drawn, followed by starting zosyn     Thrombocytosis  Leukocytosis  Platelets 1,062 -> 911 and WBC increased from 20.8 to 36.7 overnight. Previous thrombocytosis seen April 2025, could be reactive to infection especially with being febrile this AM  - CBC with differential pending  - Peripheral smear pending      MAGDALENO with bilateral moderate to severe hydronephrosis 2/2 above  -Baseline serum creatinine: ~0.8-0.9  -Creatinine at admission: 1.43  -IVF: NS 1500 mL  -continue to trend     Acute hypoxia  -CXR, BNP negative  -currently on 3L NC after receiving morphine in ED, wean back to baseline RA as tolerated     HTN  -continue home amlodipine     AAA  -patient unaware of this finding, stable finding compared to prior CT  -follow-up vascular surgery outpatient     Diet: Regular and NPO @ MN  DVT Prophylaxis: SCDs, Lovenox   Code Status: Full Code   Case Discussed With: ED provider  Additional Sources Reviewed: ED note day of admission; Urology       Complexity high, anticipate 1-2 additional MN  stays    Reza Stevens MD

## 2025-07-10 LAB
ANION GAP SERPL CALC-SCNC: 14 MMOL/L (ref 10–20)
BUN SERPL-MCNC: 24 MG/DL (ref 6–23)
CALCIUM SERPL-MCNC: 9.9 MG/DL (ref 8.6–10.3)
CHLORIDE SERPL-SCNC: 106 MMOL/L (ref 98–107)
CO2 SERPL-SCNC: 23 MMOL/L (ref 21–32)
CREAT SERPL-MCNC: 1.13 MG/DL (ref 0.5–1.3)
EGFRCR SERPLBLD CKD-EPI 2021: 69 ML/MIN/1.73M*2
ERYTHROCYTE [DISTWIDTH] IN BLOOD BY AUTOMATED COUNT: 18.1 % (ref 11.5–14.5)
GLUCOSE SERPL-MCNC: 84 MG/DL (ref 74–99)
HCT VFR BLD AUTO: 27.2 % (ref 41–52)
HGB BLD-MCNC: 8.5 G/DL (ref 13.5–17.5)
MCH RBC QN AUTO: 23.8 PG (ref 26–34)
MCHC RBC AUTO-ENTMCNC: 31.3 G/DL (ref 32–36)
MCV RBC AUTO: 76 FL (ref 80–100)
NRBC BLD-RTO: 0 /100 WBCS (ref 0–0)
PLATELET # BLD AUTO: 775 X10*3/UL (ref 150–450)
POTASSIUM SERPL-SCNC: 4.1 MMOL/L (ref 3.5–5.3)
RBC # BLD AUTO: 3.57 X10*6/UL (ref 4.5–5.9)
SODIUM SERPL-SCNC: 139 MMOL/L (ref 136–145)
WBC # BLD AUTO: 19.7 X10*3/UL (ref 4.4–11.3)

## 2025-07-10 PROCEDURE — 1200000002 HC GENERAL ROOM WITH TELEMETRY DAILY

## 2025-07-10 PROCEDURE — 2500000001 HC RX 250 WO HCPCS SELF ADMINISTERED DRUGS (ALT 637 FOR MEDICARE OP)

## 2025-07-10 PROCEDURE — 2500000004 HC RX 250 GENERAL PHARMACY W/ HCPCS (ALT 636 FOR OP/ED)

## 2025-07-10 PROCEDURE — 36415 COLL VENOUS BLD VENIPUNCTURE: CPT | Performed by: INTERNAL MEDICINE

## 2025-07-10 PROCEDURE — 99233 SBSQ HOSP IP/OBS HIGH 50: CPT | Performed by: INTERNAL MEDICINE

## 2025-07-10 PROCEDURE — 85027 COMPLETE CBC AUTOMATED: CPT | Performed by: INTERNAL MEDICINE

## 2025-07-10 PROCEDURE — 2500000001 HC RX 250 WO HCPCS SELF ADMINISTERED DRUGS (ALT 637 FOR MEDICARE OP): Performed by: INTERNAL MEDICINE

## 2025-07-10 PROCEDURE — 2500000004 HC RX 250 GENERAL PHARMACY W/ HCPCS (ALT 636 FOR OP/ED): Performed by: INTERNAL MEDICINE

## 2025-07-10 PROCEDURE — 80048 BASIC METABOLIC PNL TOTAL CA: CPT | Performed by: INTERNAL MEDICINE

## 2025-07-10 RX ADMIN — PIPERACILLIN SODIUM AND TAZOBACTAM SODIUM 2.25 G: 2; .25 INJECTION, SOLUTION INTRAVENOUS at 08:32

## 2025-07-10 RX ADMIN — ACETAMINOPHEN 975 MG: 325 TABLET ORAL at 14:46

## 2025-07-10 RX ADMIN — PIPERACILLIN SODIUM AND TAZOBACTAM SODIUM 2.25 G: 2; .25 INJECTION, SOLUTION INTRAVENOUS at 14:46

## 2025-07-10 RX ADMIN — MORPHINE SULFATE 4 MG: 4 INJECTION, SOLUTION INTRAMUSCULAR; INTRAVENOUS at 02:49

## 2025-07-10 RX ADMIN — MORPHINE SULFATE 4 MG: 4 INJECTION, SOLUTION INTRAMUSCULAR; INTRAVENOUS at 11:39

## 2025-07-10 RX ADMIN — PIPERACILLIN SODIUM AND TAZOBACTAM SODIUM 2.25 G: 2; .25 INJECTION, SOLUTION INTRAVENOUS at 02:49

## 2025-07-10 RX ADMIN — MORPHINE SULFATE 2 MG: 2 INJECTION, SOLUTION INTRAMUSCULAR; INTRAVENOUS at 20:42

## 2025-07-10 RX ADMIN — FERROUS SULFATE TAB 325 MG (65 MG ELEMENTAL FE) 1 TABLET: 325 (65 FE) TAB at 08:33

## 2025-07-10 RX ADMIN — PIPERACILLIN SODIUM AND TAZOBACTAM SODIUM 2.25 G: 2; .25 INJECTION, SOLUTION INTRAVENOUS at 20:44

## 2025-07-10 RX ADMIN — AMLODIPINE BESYLATE 10 MG: 10 TABLET ORAL at 20:42

## 2025-07-10 RX ADMIN — PANTOPRAZOLE SODIUM 40 MG: 40 TABLET, DELAYED RELEASE ORAL at 06:30

## 2025-07-10 RX ADMIN — ENOXAPARIN SODIUM 30 MG: 30 INJECTION SUBCUTANEOUS at 08:32

## 2025-07-10 ASSESSMENT — COGNITIVE AND FUNCTIONAL STATUS - GENERAL
DRESSING REGULAR LOWER BODY CLOTHING: A LITTLE
CLIMB 3 TO 5 STEPS WITH RAILING: A LITTLE
DAILY ACTIVITIY SCORE: 23
MOBILITY SCORE: 20
CLIMB 3 TO 5 STEPS WITH RAILING: A LITTLE
MOVING TO AND FROM BED TO CHAIR: A LITTLE
WALKING IN HOSPITAL ROOM: A LITTLE
DAILY ACTIVITIY SCORE: 23
MOBILITY SCORE: 20
STANDING UP FROM CHAIR USING ARMS: A LITTLE
WALKING IN HOSPITAL ROOM: A LITTLE
MOVING TO AND FROM BED TO CHAIR: A LITTLE
STANDING UP FROM CHAIR USING ARMS: A LITTLE
DRESSING REGULAR LOWER BODY CLOTHING: A LITTLE

## 2025-07-10 ASSESSMENT — PAIN SCALES - GENERAL
PAINLEVEL_OUTOF10: 8
PAINLEVEL_OUTOF10: 8
PAINLEVEL_OUTOF10: 5 - MODERATE PAIN
PAINLEVEL_OUTOF10: 5 - MODERATE PAIN
PAINLEVEL_OUTOF10: 8
PAINLEVEL_OUTOF10: 7
PAINLEVEL_OUTOF10: 2

## 2025-07-10 ASSESSMENT — PAIN DESCRIPTION - DESCRIPTORS
DESCRIPTORS: ACHING;DISCOMFORT
DESCRIPTORS: ACHING;DISCOMFORT

## 2025-07-10 ASSESSMENT — PAIN - FUNCTIONAL ASSESSMENT
PAIN_FUNCTIONAL_ASSESSMENT: 0-10

## 2025-07-10 ASSESSMENT — PAIN DESCRIPTION - ORIENTATION
ORIENTATION: LEFT
ORIENTATION: LEFT

## 2025-07-10 ASSESSMENT — PAIN DESCRIPTION - LOCATION
LOCATION: ABDOMEN
LOCATION: ABDOMEN
LOCATION: BACK

## 2025-07-10 NOTE — PROGRESS NOTES
"                                                                                                               '' Infectious Disease Progress Note''        Interval Events:  No new symptoms  Blood culture grew Providencia   Repeat blood cultures pending  WBC 19K  Estimated Creatinine Clearance: 45.5 mL/min (by C-G formula based on SCr of 1.13 mg/dL).        Current antibiotic:  Zosyn    Physical Exam:  /70 (BP Location: Right arm, Patient Position: Sitting)   Pulse 72   Temp 37.4 °C (99.3 °F) (Temporal)   Resp 20   Ht 1.88 m (6' 2.02\")   Wt 54.4 kg (119 lb 14.9 oz)   SpO2 98%   BMI 15.39 kg/m²   General: NAD, nontoxic appearing  Skin: no new rash  Resp: lungs CTA b/l  CV:  normal S1/S2, no murmur   Abd: soft, non-tender  Ext: no edema      Lab Results:  Reviewed    Micro:  7/7 blood culture: Providencia  7/8 blood culture: IP    Imaging: reviewed         Assessment:  Patient is 70-year-old male with history of hypertension, prostate cancer s/p XRT, AAA, admitted on 7/7 with severe flank pain.       - Septicemia with urinary soource d/t providencia   - Obstructive uropathy d/t  staghorn calculus left ureter s/p cystoscopy and stent placed 7/8  -Complicated UTI  -Bilateral hydronephrosis  - Leukocytosis: Trending down  - MAGDALENO  - BPH s/p chronic Connors  - History of recurrent UTIs  -Nephrolithiasis s/p right percutaneous nephrolithotomy with stent placement 5/28 followed by lithotripsy 6/23/25        Plan/Recommendations:  - Continue Zosyn  - Follow blood culture sensi  -Trend WBC and creatinine  - Follow urology recommendation  - He may benefit from long term suppression antibiotic as UTI PPx  - Monitoring for adverse effect of antibiotic such as diarrhea or rash        Please call ID with any concerns or questions.      Wu Huber MD  [facility]  #[phone]      "

## 2025-07-10 NOTE — PROGRESS NOTES
Paul Bruno is a 70 y.o. male on day 3 of admission presenting with Pyelonephritis.      Subjective   On room air, afebrile, no overnight. Tolerating PO intake.        Objective     Last Recorded Vitals  /66 (BP Location: Right arm, Patient Position: Sitting)   Pulse 70   Temp 37.8 °C (100 °F) (Temporal)   Resp 16   Wt 54.4 kg (119 lb 14.9 oz)   SpO2 95%   Intake/Output last 3 Shifts:    Intake/Output Summary (Last 24 hours) at 7/10/2025 0847  Last data filed at 7/10/2025 0836  Gross per 24 hour   Intake 530 ml   Output 2250 ml   Net -1720 ml       Admission Weight  Weight: 54.4 kg (120 lb) (07/07/25 1748)    Daily Weight  07/08/25 : 54.4 kg (119 lb 14.9 oz)    Image Results  FL less than 1 hour  These images are not reportable by radiology and will not be interpreted   by  Radiologists.      Physical Exam  Constitutional: A&Ox4, NAD, resting comfortable, underweight male   Head and Face: Atraumatic, normocephalic   Eyes: Normal external exam, EOMI  ENT: Normal external inspection of ears and nose. Oropharynx normal.  Cardiovascular: RRR, S1/S2, no murmurs, rubs, or gallops, radial pulses +2  Pulmonary: CTAB, no respiratory distress, no wheezing, rales or rhonchi, on RA  G/U: L sided CVA tenderness/flank pain, daniel in place  Abdomen: +BS, soft, non-tender, nondistended, no guarding rigidity or rebound tenderness, no masses noted  MSK: Negative for edema, No joint swelling, normal movements of all extremities.   Neuro: No focal deficits, normal motor function, normal sensation, follows all commands  Skin- No lesions, contusions, or erythema.  Psychiatric: Judgment intact. Appropriate mood, affect and behavior   Relevant Results           This patient has a urinary catheter   Reason for the urinary catheter remaining today? perioperative use for selected surgical procedures          Assessment & Plan  Pyelonephritis    Bilateral ureteral calculi      7/10:  Chester resolved  Wbc improving  BC growing  Patricia rettgeri.   Repeat BC NGTD.     F/up abx plan per ID and any further  recs.   Possible dc home later today with daniel and OP f/up with .       7/9:  S/p b/l stent placement yesterday... 2nd R stent, 1st L stent... f/up . Primary  doc is Dr Willis. Cont indwelling daniel.     Pyelo, bacteremia with GNB... cont iv abx, f/up ID.     Hx AAA... OP monitoring.     Home regimen for chronic conditions  Dvt px with lovenox.         7/8:    70 y.o. male with PMHx s/f HTN, BPH, prostate CA s/p XRT, AAA, kidney stones s/p R percutaneous nephrolithotomy with stent placement 5/28 followed by lithotripsy 6/23, Daniel dependent with recent exchange a few days ago presenting with severe right flank pain starting around 3am today.     Pyelonephritis with bilateral ureteral calculi on chronic indwelling catheter, POA  -continue with IV rocephin, pain control  -urine culture pending  - Previous UCX: pan-sensitive proteus and E. Coli, on appropriate antibiotics currently but due to uptrending leukocytosis will switch to zosyn  -urology consult, NPO, plan for stent placement  -Fever Tmax 38.7 this AM, sepsis criteria met  -BCX being drawn, followed by starting zosyn     Thrombocytosis  Leukocytosis  Platelets 1,062 -> 911 and WBC increased from 20.8 to 36.7 overnight. Previous thrombocytosis seen April 2025, could be reactive to infection especially with being febrile this AM  - CBC with differential pending  - Peripheral smear pending      MAGDALENO with bilateral moderate to severe hydronephrosis 2/2 above  -Baseline serum creatinine: ~0.8-0.9  -Creatinine at admission: 1.43  -IVF: NS 1500 mL  -continue to trend     Acute hypoxia  -CXR, BNP negative  -currently on 3L NC after receiving morphine in ED, wean back to baseline RA as tolerated     HTN  -continue home amlodipine     AAA  -patient unaware of this finding, stable finding compared to prior CT  -follow-up vascular surgery outpatient     Diet: Regular and NPO @  MN  DVT Prophylaxis: SCDs, Lovenox   Code Status: Full Code   Case Discussed With: ED provider  Additional Sources Reviewed: ED note day of admission; Urology       Complexity high, anticipate 1-2 additional MN stays    Reza Stevens MD

## 2025-07-10 NOTE — PROGRESS NOTES
Paul Bruno is a 70 y.o. male on day 3 of admission presenting with Pyelonephritis.    Plan: s/p stent placement for pyleonephritis, chronic daniel replaced during surgery as well. Awaiting cultures-negative to date. ID final recs pending. Anticipate home later this afternoon vs tomorrow depending on ID recs.  Disposition: Home with S.RADHA Reyes  Barrier: cultures, ID recs  ADOD:  today/tomorrow       07/10/25 0913   Discharge Planning   Living Arrangements Spouse/significant other   Support Systems Spouse/significant other   Assistance Needed Chronic Daniel- replaced during surgery on 7/8/25   Expected Discharge Disposition Home  (declines need for [facility]. patient cares for Chronic daniel independently.)   Does the patient need discharge transport arranged? No   Intensity of Service   Intensity of Service 0-30 min       Ave Mg RN

## 2025-07-10 NOTE — CARE PLAN
The patient's goals for the shift include  remain safe and decrease pain    The clinical goals for the shift include maintain safety and be free from falls      Problem: Pain - Adult  Goal: Verbalizes/displays adequate comfort level or baseline comfort level  Outcome: Progressing     Problem: Safety - Adult  Goal: Free from fall injury  Outcome: Progressing     Problem: Nutrition  Goal: Nutrient intake appropriate for maintaining nutritional needs  Outcome: Progressing

## 2025-07-10 NOTE — DOCUMENTATION CLARIFICATION NOTE
"    PATIENT:               LUIS ARMANDO QUAN  ACCT #:                  [id number]  MRN:                       [id number]  :                       1955  ADMIT DATE:       2025 8:04 PM  DISCH DATE:  RESPONDING PROVIDER #:        [id number]          PROVIDER RESPONSE TEXT:    Stage 1 pressure injury to coccyx, present on admission    CDI QUERY TEXT:    Clarification    Instruction:    Based on your assessment of the patient and the clinical information, please provide the requested documentation by clicking on the appropriate radio button and enter any additional information if prompted.    Question: Please further clarify the pressure injury site, stage/depth    When answering this query, please exercise your independent professional judgment. The fact that a question is being asked, does not imply that any particular answer is desired or expected.    The patient's clinical indicators include:  Clinical Information: 69 y/o male presented with left flank pain, n/v. Admitted with Bilateral hydronephrosis with kidney stones, UTI, MAGDALENO.    Clinical Indicators: Nursing notes \"Stage 1 non-blanchable pressure injury to coccyx POA\"    Described as \"cachectic with bony prominences\"    Treatment: Mepilex dressing, turn q 2 hours    Risk Factors: age, weight loss, history of prostate cancer, BMI 15.39  Options provided:  -- Stage 1 pressure injury to coccyx, present on admission  -- Other - I will add my own diagnosis  -- Refer to Clinical Documentation Reviewer    Query created by: Trish Santoyo on 7/10/2025 7:51 AM      Electronically signed by:  CHRISTOPHER KNOWLES MD 7/10/2025 11:39 AM          "

## 2025-07-10 NOTE — PROGRESS NOTES
"Paul Bruno is a 70 y.o. male on day 3 of admission presenting with Pyelonephritis.    Subjective   Patient is awake sitting in bed this morning. He is doing well. Tolerating PO       Objective     Physical Exam  Constitutional:       Appearance: Normal appearance.   Cardiovascular:      Rate and Rhythm: Normal rate and regular rhythm.   Pulmonary:      Effort: Pulmonary effort is normal.      Comments: Non labored breathing on RA  Genitourinary:     Comments: Connors in place with clear yellow urine  Skin:     General: Skin is warm and dry.   Neurological:      General: No focal deficit present.      Mental Status: He is alert and oriented to person, place, and time. Mental status is at baseline.   Psychiatric:         Attention and Perception: Attention normal.         Mood and Affect: Mood normal.         Speech: Speech normal.         Behavior: Behavior normal.         Cognition and Memory: Cognition normal.         Last Recorded Vitals  Blood pressure 120/66, pulse 70, temperature 37.8 °C (100 °F), temperature source Temporal, resp. rate 16, height 1.88 m (6' 2.02\"), weight 54.4 kg (119 lb 14.9 oz), SpO2 95%.  Intake/Output last 3 Shifts:  I/O last 3 completed shifts:  In: 390 (7.2 mL/kg) [P.O.:240; IV Piggyback:150]  Out: 3700 (68 mL/kg) [Urine:3700 (1.9 mL/kg/hr)]  Weight: 54.4 kg     Relevant Results  Scheduled medications  Scheduled Medications[1]  Continuous medications  Continuous Medications[2]  PRN medications  PRN Medications[3]   Results for orders placed or performed during the hospital encounter of 07/07/25 (from the past 24 hours)   Basic Metabolic Panel   Result Value Ref Range    Glucose 84 74 - 99 mg/dL    Sodium 139 136 - 145 mmol/L    Potassium 4.1 3.5 - 5.3 mmol/L    Chloride 106 98 - 107 mmol/L    Bicarbonate 23 21 - 32 mmol/L    Anion Gap 14 10 - 20 mmol/L    Urea Nitrogen 24 (H) 6 - 23 mg/dL    Creatinine 1.13 0.50 - 1.30 mg/dL    eGFR 69 >60 mL/min/1.73m*2    Calcium 9.9 8.6 - 10.3 mg/dL "   CBC   Result Value Ref Range    WBC 19.7 (H) 4.4 - 11.3 x10*3/uL    nRBC 0.0 0.0 - 0.0 /100 WBCs    RBC 3.57 (L) 4.50 - 5.90 x10*6/uL    Hemoglobin 8.5 (L) 13.5 - 17.5 g/dL    Hematocrit 27.2 (L) 41.0 - 52.0 %    MCV 76 (L) 80 - 100 fL    MCH 23.8 (L) 26.0 - 34.0 pg    MCHC 31.3 (L) 32.0 - 36.0 g/dL    RDW 18.1 (H) 11.5 - 14.5 %    Platelets 775 (H) 150 - 450 x10*3/uL      FL less than 1 hour   Final Result      XR chest 2 views   Final Result   No acute cardiopulmonary process.             MACRO:   None.        Signed by: Wade Corcoran 7/7/2025 9:52 PM   Dictation workstation:   [id number]      CT abdomen pelvis wo IV contrast   Final Result   1. Fragmented staghorn calculus in the left renal pelvis and probable   4 mm calculus in the proximal left ureter. There is moderate left   hydronephrosis and perinephric fat stranding. Correlate for evidence   of pyelonephritis.   2. Interval removal of a right double-J ureteral stent. 1.1 cm   calculus at the right ureteropelvic junction with moderate-to-severe   right hydronephrosis. Mild wall thickening of the right renal pelvis,   correlate for pyelitis.   3. Circumferential bladder wall thickening in the setting of partial   decompression with a Connors catheter. Correlate for cystitis.   4. 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 There is mild   flattening/straightening of the posterior wall of the aorta again see   adjacent vertebral body. This is a finding which may be seen in the   setting of impending rupture. Vascular surgery consultation/follow-up   is recommended. 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   5. Multiple nodules along the right major fissure, at least 1 of   which appears slightly increased in size. Similar appearance of the   1.6 cm juxtapleural nodularity along the left hemidiaphragm. These   are noted to demonstrate PSMA expression on prior PET-CT. Consider   follow-up PET-CT or dedicated chest imaging  for further assessment.        MACRO:   Critical Finding:  See findings. Notification was initiated on   7/7/2025 at 7:53 pm by  Gio Good.  (**-YCF-**) Instructions:        Signed by: Gio Good 7/7/2025 7:53 PM   Dictation workstation:   [id number]                        Assessment & Plan  Pyelonephritis    Bilateral ureteral calculi    Plan: POD 2 CYSTOSCOPY, WITH URETERAL STENT INSERTION Findings: R RPG with filling defect at UPJ. L RPG with no hydro or filling defects. 6x26JJ placed bilaterally without string. Connors replaced at end of case   - Regular diet   - PRN pain control  - PRN antiemetic   - Encourage OOB  - Encourage IS  - DVT ppx: SCDs/ Lovenox  - Maintain Connors for chronic retention  - IV ABX     Cr back to normal. WBC down trending. DC with Connors. Urology will s/o. Will need to follow up with Dr. Kelley as OP.      I spent 35 minutes in the professional and overall care of this patient.      Ariel Chapman PA-C           [1] amLODIPine, 10 mg, oral, q PM  enoxaparin, 30 mg, subcutaneous, q24h  ferrous sulfate, 1 tablet, oral, Daily with breakfast  pantoprazole, 40 mg, oral, Daily before breakfast  piperacillin-tazobactam, 2.25 g, intravenous, q6h     [2]    [3] PRN medications: [Transfer Hold] acetaminophen, benzocaine-menthol, dextromethorphan-guaifenesin, [Transfer Hold] HYDROmorphone, melatonin, morphine, morphine, ondansetron, polyethylene glycol

## 2025-07-11 ENCOUNTER — APPOINTMENT (OUTPATIENT)
Dept: CARDIOLOGY | Facility: HOSPITAL | Age: 70
DRG: 659 | End: 2025-07-11
Payer: COMMERCIAL

## 2025-07-11 ENCOUNTER — PHARMACY VISIT (OUTPATIENT)
Dept: PHARMACY | Facility: CLINIC | Age: 70
End: 2025-07-11
Payer: MEDICARE

## 2025-07-11 VITALS
OXYGEN SATURATION: 91 % | WEIGHT: 119.93 LBS | BODY MASS INDEX: 15.39 KG/M2 | RESPIRATION RATE: 18 BRPM | SYSTOLIC BLOOD PRESSURE: 120 MMHG | HEART RATE: 71 BPM | DIASTOLIC BLOOD PRESSURE: 68 MMHG | HEIGHT: 74 IN | TEMPERATURE: 99 F

## 2025-07-11 LAB
ANION GAP SERPL CALC-SCNC: 15 MMOL/L (ref 10–20)
BACTERIA BLD AEROBE CULT: ABNORMAL
BACTERIA BLD CULT: ABNORMAL
BUN SERPL-MCNC: 19 MG/DL (ref 6–23)
CALCIUM SERPL-MCNC: 10.1 MG/DL (ref 8.6–10.3)
CHLORIDE SERPL-SCNC: 106 MMOL/L (ref 98–107)
CO2 SERPL-SCNC: 24 MMOL/L (ref 21–32)
CREAT SERPL-MCNC: 0.96 MG/DL (ref 0.5–1.3)
EGFRCR SERPLBLD CKD-EPI 2021: 84 ML/MIN/1.73M*2
ERYTHROCYTE [DISTWIDTH] IN BLOOD BY AUTOMATED COUNT: 18.1 % (ref 11.5–14.5)
GLUCOSE SERPL-MCNC: 88 MG/DL (ref 74–99)
GRAM STN SPEC: ABNORMAL
GRAM STN SPEC: ABNORMAL
HCT VFR BLD AUTO: 27.3 % (ref 41–52)
HGB BLD-MCNC: 8.8 G/DL (ref 13.5–17.5)
MCH RBC QN AUTO: 23.5 PG (ref 26–34)
MCHC RBC AUTO-ENTMCNC: 32.2 G/DL (ref 32–36)
MCV RBC AUTO: 73 FL (ref 80–100)
NRBC BLD-RTO: 0 /100 WBCS (ref 0–0)
PLATELET # BLD AUTO: 793 X10*3/UL (ref 150–450)
POTASSIUM SERPL-SCNC: 4.8 MMOL/L (ref 3.5–5.3)
RBC # BLD AUTO: 3.74 X10*6/UL (ref 4.5–5.9)
SODIUM SERPL-SCNC: 140 MMOL/L (ref 136–145)
WBC # BLD AUTO: 11.1 X10*3/UL (ref 4.4–11.3)

## 2025-07-11 PROCEDURE — 85027 COMPLETE CBC AUTOMATED: CPT | Performed by: INTERNAL MEDICINE

## 2025-07-11 PROCEDURE — 2500000004 HC RX 250 GENERAL PHARMACY W/ HCPCS (ALT 636 FOR OP/ED)

## 2025-07-11 PROCEDURE — 99239 HOSP IP/OBS DSCHRG MGMT >30: CPT | Performed by: INTERNAL MEDICINE

## 2025-07-11 PROCEDURE — 93005 ELECTROCARDIOGRAM TRACING: CPT

## 2025-07-11 PROCEDURE — 2500000004 HC RX 250 GENERAL PHARMACY W/ HCPCS (ALT 636 FOR OP/ED): Performed by: INTERNAL MEDICINE

## 2025-07-11 PROCEDURE — RXMED WILLOW AMBULATORY MEDICATION CHARGE

## 2025-07-11 PROCEDURE — 36415 COLL VENOUS BLD VENIPUNCTURE: CPT | Performed by: INTERNAL MEDICINE

## 2025-07-11 PROCEDURE — 2500000001 HC RX 250 WO HCPCS SELF ADMINISTERED DRUGS (ALT 637 FOR MEDICARE OP): Performed by: INTERNAL MEDICINE

## 2025-07-11 PROCEDURE — 80048 BASIC METABOLIC PNL TOTAL CA: CPT | Performed by: INTERNAL MEDICINE

## 2025-07-11 RX ORDER — CIPROFLOXACIN 500 MG/1
500 TABLET, FILM COATED ORAL 2 TIMES DAILY
Qty: 20 TABLET | Refills: 0 | Status: SHIPPED | OUTPATIENT
Start: 2025-07-11 | End: 2025-07-21

## 2025-07-11 RX ORDER — OXYCODONE AND ACETAMINOPHEN 5; 325 MG/1; MG/1
1 TABLET ORAL EVERY 6 HOURS PRN
Qty: 15 TABLET | Refills: 0 | Status: SHIPPED | OUTPATIENT
Start: 2025-07-11

## 2025-07-11 RX ADMIN — MORPHINE SULFATE 4 MG: 4 INJECTION, SOLUTION INTRAMUSCULAR; INTRAVENOUS at 02:00

## 2025-07-11 RX ADMIN — PIPERACILLIN SODIUM AND TAZOBACTAM SODIUM 2.25 G: 2; .25 INJECTION, SOLUTION INTRAVENOUS at 02:00

## 2025-07-11 RX ADMIN — ENOXAPARIN SODIUM 30 MG: 30 INJECTION SUBCUTANEOUS at 08:37

## 2025-07-11 RX ADMIN — FERROUS SULFATE TAB 325 MG (65 MG ELEMENTAL FE) 1 TABLET: 325 (65 FE) TAB at 08:37

## 2025-07-11 RX ADMIN — PANTOPRAZOLE SODIUM 40 MG: 40 TABLET, DELAYED RELEASE ORAL at 06:41

## 2025-07-11 RX ADMIN — PIPERACILLIN SODIUM AND TAZOBACTAM SODIUM 2.25 G: 2; .25 INJECTION, SOLUTION INTRAVENOUS at 08:37

## 2025-07-11 RX ADMIN — MORPHINE SULFATE 2 MG: 2 INJECTION, SOLUTION INTRAMUSCULAR; INTRAVENOUS at 08:37

## 2025-07-11 ASSESSMENT — COGNITIVE AND FUNCTIONAL STATUS - GENERAL
MOVING TO AND FROM BED TO CHAIR: A LITTLE
DRESSING REGULAR LOWER BODY CLOTHING: A LITTLE
CLIMB 3 TO 5 STEPS WITH RAILING: A LITTLE
MOBILITY SCORE: 20
WALKING IN HOSPITAL ROOM: A LITTLE
STANDING UP FROM CHAIR USING ARMS: A LITTLE
DAILY ACTIVITIY SCORE: 23

## 2025-07-11 ASSESSMENT — PAIN DESCRIPTION - ORIENTATION
ORIENTATION: LEFT
ORIENTATION: RIGHT;LEFT

## 2025-07-11 ASSESSMENT — PAIN - FUNCTIONAL ASSESSMENT
PAIN_FUNCTIONAL_ASSESSMENT: 0-10

## 2025-07-11 ASSESSMENT — PAIN SCALES - GENERAL
PAINLEVEL_OUTOF10: 8
PAINLEVEL_OUTOF10: 8
PAINLEVEL_OUTOF10: 4
PAINLEVEL_OUTOF10: 6

## 2025-07-11 ASSESSMENT — PAIN DESCRIPTION - LOCATION: LOCATION: BACK

## 2025-07-11 NOTE — CARE PLAN
Problem: Pain - Adult  Goal: Verbalizes/displays adequate comfort level or baseline comfort level  Outcome: Progressing     Problem: Safety - Adult  Goal: Free from fall injury  Outcome: Progressing     Problem: Discharge Planning  Goal: Discharge to home or other facility with appropriate resources  Outcome: Progressing     Problem: Chronic Conditions and Co-morbidities  Goal: Patient's chronic conditions and co-morbidity symptoms are monitored and maintained or improved  Outcome: Progressing     Problem: Chronic Conditions and Co-morbidities  Goal: Patient's chronic conditions and co-morbidity symptoms are monitored and maintained or improved  Outcome: Progressing     Problem: Nutrition  Goal: Nutrient intake appropriate for maintaining nutritional needs  Outcome: Progressing

## 2025-07-11 NOTE — PROGRESS NOTES
"                                                                                                               '' Infectious Disease Progress Note''        Interval Events:  No new symptoms  He wants to go home  Repeat blood culture is NGTD  WBC 11K  Estimated Creatinine Clearance: 53.5 mL/min (by C-G formula based on SCr of 0.96 mg/dL).        Current antibiotic:  Zosyn    Physical Exam:  /68 (BP Location: Right arm, Patient Position: Sitting)   Pulse 77   Temp 37.5 °C (99.5 °F) (Temporal)   Resp 17   Ht 1.88 m (6' 2.02\")   Wt 54.4 kg (119 lb 14.9 oz)   SpO2 92%   BMI 15.39 kg/m²   General: NAD, nontoxic appearing  Skin: no new rash  Resp: lungs CTA b/l  CV:  normal S1/S2, no murmur   Abd: soft, non-tender  Ext: no edema      Lab Results:  Reviewed    Micro:  7/7 blood culture: Providencia  7/8 blood culture: NGTD    Imaging: reviewed         Assessment:  Patient is 70-year-old male with history of hypertension, prostate cancer s/p XRT, AAA, admitted on 7/7 with severe flank pain.       - Septicemia with urinary soource d/t providencia   - Obstructive uropathy d/t  staghorn calculus left ureter s/p cystoscopy and stent placed 7/8  -Complicated UTI  -Bilateral hydronephrosis  - Leukocytosis: Resolved  - MAGDALENO  - BPH s/p chronic Connors  - History of recurrent UTIs  -Nephrolithiasis s/p right percutaneous nephrolithotomy with stent placement 5/28 followed by lithotripsy 6/23/25        Plan/Recommendations:  - Continue Zosyn (switch to ciprofloxacin 500 mg p.o. every 12 hours through 7/21/2025 if he is discharged today)  - Follow blood culture sensi  - Follow urology recommendation  - He may benefit from long term suppression antibiotic as UTI PPX (He can call my office at [phone] for follow up and starting abx PPx after finishing abx treatment course)  - Monitoring for adverse effect of antibiotic such as diarrhea or rash        Please call ID with any concerns or questions.  D/w primary " team.      Wu Huber MD  [facility]  #[phone]

## 2025-07-11 NOTE — CARE PLAN
The patient's goals for the shift include      The clinical goals for the shift include Patient will be free of pain this shift

## 2025-07-11 NOTE — NURSING NOTE

## 2025-07-11 NOTE — PROGRESS NOTES
Paul Bruno is a 70 y.o. male on day 4 of admission presenting with Pyelonephritis.    Plan: s/p stent placement for pyleonephritis, chronic daniel replaced during surgery as well. Awaiting cultures-negative to date. ID final recs pending. Anticipate home later this afternoon vs tomorrow depending on ID recs.  Disposition: Home with S.RADHA Reyes  Barrier: cultures, ID recs  ADOD:  today/tomorrow       07/11/25 0901   Discharge Planning   Living Arrangements Spouse/significant other   Home or Post Acute Services None   Expected Discharge Disposition Home   Intensity of Service   Intensity of Service 0-30 min       Ave Mg RN

## 2025-07-11 NOTE — DISCHARGE SUMMARY
Discharge Diagnosis  Pyelonephritis           Discharge Meds     Medication List      START taking these medications     ciprofloxacin 500 mg tablet; Commonly known as: Cipro; Take 1 tablet   (500 mg) by mouth 2 times a day for 10 days.     CHANGE how you take these medications     amLODIPine 10 mg tablet; Commonly known as: Norvasc; Take 1 tablet (10   mg) by mouth once daily.; What changed: when to take this   tamsulosin 0.4 mg 24 hr capsule; Commonly known as: Flomax; Take 2   capsules (0.8 mg) by mouth once daily.; What changed: when to take this     CONTINUE taking these medications     ascorbic acid (vitamin C) 1,000 mg ER tablet   aspirin-acetaminophen-caffeine 250-250-65 mg tablet; Commonly known as:   Excedrin Migraine   cyanocobalamin 100 mcg tablet; Commonly known as: Vitamin B-12   ferrous sulfate 325 mg (65 mg elemental) tablet   MIRALAX ORAL   multivitamin with minerals tablet   oxyCODONE-acetaminophen 5-325 mg tablet; Commonly known as: Percocet;   Take 1 tablet by mouth every 6 hours if needed for severe pain (7 - 10).   TURMERIC ORAL   VITAMIN E ORAL       Test Results Pending At Discharge  Pending Labs       Order Current Status    Blood Culture Preliminary result    Blood Culture Preliminary result    Blood Culture Preliminary result    Blood Culture Preliminary result            Hospital Course       7/11:  Chester resolved.   Leukocytosis resolved.   Cleared for Dc by ID with 10 day Cipro course to complete.   Will ensure Qtc is ok prior to dc.   He will f/up with his primary  doc.   Also will f/up with PCP for mgmt of chronic conditions including AAA monitoring.     Dispo is home.     Pt clinically and hemodynamically stable, tolerating PO intake and room air.   Instructed to F/U with PCP within 5 days.   He understands and agrees with the dc plan.     More than 30 min spent on dc.         7/10:  Chester resolved  Wbc improving  BC growing Providencia rettgeri.   Repeat BC NGTD.      F/up abx plan  per ID and any further  recs.   Possible dc home later today with daniel and OP f/up with .         7/9:  S/p b/l stent placement yesterday... 2nd R stent, 1st L stent... f/up . Primary  doc is Dr Willis. Cont indwelling daniel.      Pyelo, bacteremia with GNB... cont iv abx, f/up ID.      Hx AAA... OP monitoring.      Home regimen for chronic conditions  Dvt px with lovenox.            7/8:     70 y.o. male with PMHx s/f HTN, BPH, prostate CA s/p XRT, AAA, kidney stones s/p R percutaneous nephrolithotomy with stent placement 5/28 followed by lithotripsy 6/23, Daniel dependent with recent exchange a few days ago presenting with severe right flank pain starting around 3am today.     Pyelonephritis with bilateral ureteral calculi on chronic indwelling catheter, POA  -continue with IV rocephin, pain control  -urine culture pending  - Previous UCX: pan-sensitive proteus and E. Coli, on appropriate antibiotics currently but due to uptrending leukocytosis will switch to zosyn  -urology consult, NPO, plan for stent placement  -Fever Tmax 38.7 this AM, sepsis criteria met  -BCX being drawn, followed by starting zosyn      Thrombocytosis  Leukocytosis  Platelets 1,062 -> 911 and WBC increased from 20.8 to 36.7 overnight. Previous thrombocytosis seen April 2025, could be reactive to infection especially with being febrile this AM  - CBC with differential pending  - Peripheral smear pending      MAGDALENO with bilateral moderate to severe hydronephrosis 2/2 above  -Baseline serum creatinine: ~0.8-0.9  -Creatinine at admission: 1.43  -IVF: NS 1500 mL  -continue to trend     Acute hypoxia  -CXR, BNP negative  -currently on 3L NC after receiving morphine in ED, wean back to baseline RA as tolerated     HTN  -continue home amlodipine     AAA  -patient unaware of this finding, stable finding compared to prior CT  -follow-up vascular surgery outpatient       Pertinent Physical Exam At Time of Discharge  Physical Exam  Constitutional:  "      Appearance: Normal appearance.   Cardiovascular:      Rate and Rhythm: Normal rate and regular rhythm.   Pulmonary:      Effort: Pulmonary effort is normal.      Breath sounds: Normal breath sounds.   Abdominal:      General: Abdomen is flat. Bowel sounds are normal.      Palpations: Abdomen is soft.   Genitourinary:     Comments: Connors  Musculoskeletal:      Cervical back: Normal range of motion.   Skin:     General: Skin is warm.   Neurological:      General: No focal deficit present.      Mental Status: He is alert and oriented to person, place, and time. Mental status is at baseline.   Psychiatric:         Mood and Affect: Mood normal.         Behavior: Behavior normal.         Thought Content: Thought content normal.         Judgment: Judgment normal.     /68 (BP Location: Right arm, Patient Position: Sitting)   Pulse 77   Temp 37.5 °C (99.5 °F) (Temporal)   Resp 17   Ht 1.88 m (6' 2.02\")   Wt 54.4 kg (119 lb 14.9 oz)   SpO2 92%   BMI 15.39 kg/m²       Outpatient Follow-Up  Future Appointments   Date Time Provider Department Center   7/22/2025 11:00 AM Skip Mehta MD [id number] [facility]   7/22/2025 11:30 AM INF 12 MINOFF [id number] [facility]   7/24/2025  3:15 PM Sanket Willis MD [id number] [facility]   9/15/2025 10:30 AM [place] [facility] [place] [id number]   10/6/2025 11:00 AM Miguel Angel Clark MD [id number] [place]         Reza Stevens MD  "

## 2025-07-12 LAB
BACTERIA BLD AEROBE CULT: ABNORMAL
BACTERIA BLD CULT: ABNORMAL
BACTERIA BLD CULT: NORMAL
BACTERIA BLD CULT: NORMAL
GRAM STN SPEC: ABNORMAL
GRAM STN SPEC: ABNORMAL

## 2025-07-14 LAB
BACTERIA BLD AEROBE CULT: ABNORMAL
BACTERIA BLD CULT: ABNORMAL
GRAM STN SPEC: ABNORMAL
GRAM STN SPEC: ABNORMAL

## 2025-07-19 NOTE — PROGRESS NOTES
Oncology Follow up Note  Phone visit    Cancer History  Prostate Cancer - locally advanced / clinical Stage DARY - lost to f/up concerning for met prostate cancer  EPE/christa mets  Treatment  -UTI , retention, cysto neg, PSA 40.78 3/22 - MRI prostate, bone scan   - -chronic daniel in place  -PET Gallium on study -large mass in the prostate gland with extension into both seminal vesicles avid pelvic lymph node metastases perirectal, right external iliac bilateral common iliac increased expression and aortocaval lymph node  4/18/2022: started bicalutamide 30 days  4/25/2022: Biopsy reveals GG5 prostate cancer, GS 9  4/26/2022: Started ADT .  Seen by Rad onc , refused yovana/pred  -Started XRT end 9/2022 prostate and pelvis  -non compliant lost to follow up, and our clinic, last ADT 3/23  -rising PSA/ progression with mets to lung, initial PSA response and then rising January 2025  -7/10/24 ADT restarted , refused NHT, last ADT Eligard 4/4/25      Provider Team  Skip Mehta MD   No Assigned PCP Generic Provider, MD Iron Fletcher  PCP Soren Adame - Cardiology   Mark Barrett - rad onc  Sanket Willis urology     Other contributing history  microcytosis, Aflutter, urinary incontinence, uncontrolled HTN, ongoing issues with catheter / urinary tract issues , no longer following up with urology and no daniel placement, intermittent catheter  Multiple procedures 5/25 - stone procedure and stent placement, 6/25 lithotripsy, Admission and discharge 7/25 -> pyelonephritis /cystoscopy with ureteral stent insertion , filling defect    Interval history:  The patient has recovered fairly well from his ongoing hospitalizations.  Still some ongoing issues with fatigue was having some issues with nausea and vomiting and then now is slowly tolerating medications is taking iron on a regular basis.  Denies any issues with nausea, vomiting, fevers, chills, easy bruising or bleeding denies any  ongoing issues with chest pain or chest tightness.  Still continues to follow-up with urology for his ongoing kidney stones issues    ROS:  10-pt ROS reviewed and negative except as mentioned above.    Medications: below was reviewed and is accurate  Current Outpatient Medications   Medication Instructions    amLODIPine (NORVASC) 10 mg, oral, Daily    ascorbic acid, vitamin C, 1,000 mg ER tablet 1 tablet, Daily    aspirin-acetaminophen-caffeine (Excedrin Migraine) 250-250-65 mg tablet 2 tablets, Every 6 hours PRN    cyanocobalamin (VITAMIN B-12) 100 mcg, Daily    ferrous sulfate 325 mg (65 mg elemental) tablet 325 mg of ferrous sulfate, Daily with breakfast    multivitamin with minerals tablet 1 tablet, Daily    oxyCODONE-acetaminophen (Percocet) 5-325 mg tablet 1 tablet, oral, Every 6 hours PRN    polyethylene glycol 3350 (MIRALAX ORAL) 3 times daily PRN    tamsulosin (FLOMAX) 0.8 mg, oral, Daily    TURMERIC ORAL 1,000 mg, Daily    vitamin E acetate (VITAMIN E ORAL) 1 capsule, Daily        Detailed family history and social history reviewed in detail and updated per epic charting and in detail with the patient    Physical exam:  Vitals:    07/22/25 1049   BP: 134/84   Pulse: 81   Temp: 36.8 °C (98.2 °F)   SpO2: 97%   Weight: 54.8 kg (120 lb 12.8 oz)             GEN: NAD, well-appearing  SKIN: no concerning new lesions examined in upper / lower extremity   LUNGS: CTA  CV: RRR no clear R/G  ABD: Soft, NTND. No rebound or guarding.  EXT:  trace edema bilaterally   NEURO: Grossly intact. No focal deficits.  PSYCH: Normal mood and affect        Radiology review  Reviewed in detail personally and for detail see results in EPIC  PET imagine - 6/17/24 - focal increase in prostate gland, c/w recurrent disease, lung nodules SUV 8/ 4.7, 3.7, increased update in the penis, increased uptake within the penis , mets implant vs urinary retention  3/2025 - bilateral pulm nodules, 1.9 cm, c/w prior PET PSMA, enlarged prostate,  bilateral staghorn renal calculi mild right hydro, 2.7 cm right, 1.8 cm left  CT 7/25 -chronically left staghorn calculus in the left renal pelvis and left ureter with hydronephrosis and calculus in the UPJ junction circumferential bladder wall thickening 3.5 cm abdominal aortic aneurysm, nodules along major fissure , slightly increase in size, concerned about progression     Genomics Data    Laboratory review  Reviewed in detail personally and for detail see results in EPIC  Lab Results   Component Value Date    WBC 9.8 07/21/2025    HGB 10.4 (L) 07/21/2025    HCT 33.5 (L) 07/21/2025    MCV 77 (L) 07/21/2025     (H) 07/21/2025     Lab Results   Component Value Date    GLUCOSE 99 07/21/2025    CALCIUM 10.2 07/21/2025     07/21/2025    K 4.7 07/21/2025    CO2 29 07/21/2025     07/21/2025    BUN 19 07/21/2025    CREATININE 0.86 07/21/2025     Lab Results   Component Value Date    PSA 18.28 (H) 07/21/2025    PSA 7.65 (H) 04/28/2025    PSA 4.61 (H) 03/14/2025     Lab Results   Component Value Date    ALT 15 07/21/2025    AST 14 07/21/2025    ALKPHOS 75 07/21/2025    BILITOT 0.4 07/21/2025     Lab Results   Component Value Date    TESTOSTERONE <30 (L) 07/21/2025       ASSESSMENT AND PLAN     Prostate Cancer -next ADT due , see prior notes, refusing DEXA/genomics / rising PSA, recent admissions, options discussed again, looks like from his most recent scans very low concern for new ureteral malignancy but does have concerns for may be some minimal growth in some of the nodules near the lungs all options discussed in detail with the rising PSA 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    LUTS / Staghorn calculi-recurrent admissions, f/up with Urology team    Microcytosis  and likely reactive Thrombocytosis -worsening during admission, options discussed , looks like he is back on iron with some improvement since discharge she wants to hold off on further workup we will repeat in 6 weeks and monitor closely.    Abdominal aneurysm-discussed referral to vascular surgery he wanted to hold off.      discussed need while on ADT  maintain adequate cardiovascular health, exercise, dietary modifications,  bone health with calcium 1000 mg with vitamin D 400-800 international units      Skip Mehta MD  Senior Attending Physician/  in Medicine [employer] [facility] [employer]  [facility]  Patient line [phone]  Fax [phone]

## 2025-07-21 ENCOUNTER — LAB (OUTPATIENT)
Dept: LAB | Facility: HOSPITAL | Age: 70
End: 2025-07-21
Payer: COMMERCIAL

## 2025-07-21 DIAGNOSIS — C61 PROSTATE CANCER (MULTI): ICD-10-CM

## 2025-07-21 LAB
ALBUMIN SERPL BCP-MCNC: 3.7 G/DL (ref 3.4–5)
ALP SERPL-CCNC: 75 U/L (ref 33–136)
ALT SERPL W P-5'-P-CCNC: 15 U/L (ref 10–52)
ANION GAP SERPL CALC-SCNC: 11 MMOL/L (ref 10–20)
AST SERPL W P-5'-P-CCNC: 14 U/L (ref 9–39)
BASOPHILS # BLD AUTO: 0.11 X10*3/UL (ref 0–0.1)
BASOPHILS NFR BLD AUTO: 1.1 %
BILIRUB SERPL-MCNC: 0.4 MG/DL (ref 0–1.2)
BUN SERPL-MCNC: 19 MG/DL (ref 6–23)
CALCIUM SERPL-MCNC: 10.2 MG/DL (ref 8.6–10.3)
CHLORIDE SERPL-SCNC: 105 MMOL/L (ref 98–107)
CO2 SERPL-SCNC: 29 MMOL/L (ref 21–32)
CREAT SERPL-MCNC: 0.86 MG/DL (ref 0.5–1.3)
EGFRCR SERPLBLD CKD-EPI 2021: >90 ML/MIN/1.73M*2
EOSINOPHIL # BLD AUTO: 0.26 X10*3/UL (ref 0–0.4)
EOSINOPHIL NFR BLD AUTO: 2.7 %
ERYTHROCYTE [DISTWIDTH] IN BLOOD BY AUTOMATED COUNT: 19.9 % (ref 11.5–14.5)
FERRITIN SERPL-MCNC: 95 NG/ML (ref 20–300)
GLUCOSE SERPL-MCNC: 99 MG/DL (ref 74–99)
HCT VFR BLD AUTO: 33.5 % (ref 41–52)
HGB BLD-MCNC: 10.4 G/DL (ref 13.5–17.5)
IMM GRANULOCYTES # BLD AUTO: 0.07 X10*3/UL (ref 0–0.5)
IMM GRANULOCYTES NFR BLD AUTO: 0.7 % (ref 0–0.9)
IRON SATN MFR SERPL: 34 % (ref 25–45)
IRON SERPL-MCNC: 119 UG/DL (ref 35–150)
LYMPHOCYTES # BLD AUTO: 2.44 X10*3/UL (ref 0.8–3)
LYMPHOCYTES NFR BLD AUTO: 25 %
MCH RBC QN AUTO: 23.9 PG (ref 26–34)
MCHC RBC AUTO-ENTMCNC: 31 G/DL (ref 32–36)
MCV RBC AUTO: 77 FL (ref 80–100)
MONOCYTES # BLD AUTO: 0.35 X10*3/UL (ref 0.05–0.8)
MONOCYTES NFR BLD AUTO: 3.6 %
NEUTROPHILS # BLD AUTO: 6.52 X10*3/UL (ref 1.6–5.5)
NEUTROPHILS NFR BLD AUTO: 66.9 %
NRBC BLD-RTO: 0 /100 WBCS (ref 0–0)
PLATELET # BLD AUTO: 657 X10*3/UL (ref 150–450)
POTASSIUM SERPL-SCNC: 4.7 MMOL/L (ref 3.5–5.3)
PROT SERPL-MCNC: 7.7 G/DL (ref 6.4–8.2)
PSA SERPL-MCNC: 18.28 NG/ML
RBC # BLD AUTO: 4.35 X10*6/UL (ref 4.5–5.9)
SODIUM SERPL-SCNC: 140 MMOL/L (ref 136–145)
TESTOST SERPL-MCNC: <30 NG/DL (ref 240–1000)
TIBC SERPL-MCNC: 355 UG/DL (ref 240–445)
UIBC SERPL-MCNC: 236 UG/DL (ref 110–370)
WBC # BLD AUTO: 9.8 X10*3/UL (ref 4.4–11.3)

## 2025-07-21 PROCEDURE — 85025 COMPLETE CBC W/AUTO DIFF WBC: CPT

## 2025-07-21 PROCEDURE — 36415 COLL VENOUS BLD VENIPUNCTURE: CPT

## 2025-07-21 PROCEDURE — 84403 ASSAY OF TOTAL TESTOSTERONE: CPT

## 2025-07-21 PROCEDURE — 80053 COMPREHEN METABOLIC PANEL: CPT

## 2025-07-21 PROCEDURE — 84153 ASSAY OF PSA TOTAL: CPT

## 2025-07-21 PROCEDURE — 83550 IRON BINDING TEST: CPT

## 2025-07-21 PROCEDURE — 82728 ASSAY OF FERRITIN: CPT

## 2025-07-22 ENCOUNTER — INFUSION (OUTPATIENT)
Dept: HEMATOLOGY/ONCOLOGY | Facility: CLINIC | Age: 70
End: 2025-07-22
Payer: COMMERCIAL

## 2025-07-22 ENCOUNTER — OFFICE VISIT (OUTPATIENT)
Dept: HEMATOLOGY/ONCOLOGY | Facility: CLINIC | Age: 70
End: 2025-07-22
Payer: COMMERCIAL

## 2025-07-22 VITALS
HEART RATE: 81 BPM | WEIGHT: 120.8 LBS | OXYGEN SATURATION: 97 % | DIASTOLIC BLOOD PRESSURE: 84 MMHG | TEMPERATURE: 98.2 F | SYSTOLIC BLOOD PRESSURE: 134 MMHG | BODY MASS INDEX: 15.5 KG/M2

## 2025-07-22 DIAGNOSIS — C61 PROSTATE CANCER (MULTI): ICD-10-CM

## 2025-07-22 PROCEDURE — 3075F SYST BP GE 130 - 139MM HG: CPT | Performed by: INTERNAL MEDICINE

## 2025-07-22 PROCEDURE — 99214 OFFICE O/P EST MOD 30 MIN: CPT | Performed by: INTERNAL MEDICINE

## 2025-07-22 PROCEDURE — 3079F DIAST BP 80-89 MM HG: CPT | Performed by: INTERNAL MEDICINE

## 2025-07-22 PROCEDURE — 1159F MED LIST DOCD IN RCRD: CPT | Performed by: INTERNAL MEDICINE

## 2025-07-22 PROCEDURE — 1125F AMNT PAIN NOTED PAIN PRSNT: CPT | Performed by: INTERNAL MEDICINE

## 2025-07-22 PROCEDURE — 96402 CHEMO HORMON ANTINEOPL SQ/IM: CPT

## 2025-07-22 PROCEDURE — 2500000004 HC RX 250 GENERAL PHARMACY W/ HCPCS (ALT 636 FOR OP/ED): Mod: JZ,TB

## 2025-07-22 PROCEDURE — 1111F DSCHRG MED/CURRENT MED MERGE: CPT | Performed by: INTERNAL MEDICINE

## 2025-07-22 RX ORDER — EPINEPHRINE 0.3 MG/.3ML
0.3 INJECTION SUBCUTANEOUS EVERY 5 MIN PRN
Status: DISCONTINUED | OUTPATIENT
Start: 2025-07-22 | End: 2025-07-22 | Stop reason: HOSPADM

## 2025-07-22 RX ORDER — FAMOTIDINE 10 MG/ML
20 INJECTION, SOLUTION INTRAVENOUS ONCE AS NEEDED
OUTPATIENT
Start: 2025-09-16

## 2025-07-22 RX ORDER — DIPHENHYDRAMINE HYDROCHLORIDE 50 MG/ML
50 INJECTION, SOLUTION INTRAMUSCULAR; INTRAVENOUS AS NEEDED
Status: DISCONTINUED | OUTPATIENT
Start: 2025-07-22 | End: 2025-07-22 | Stop reason: HOSPADM

## 2025-07-22 RX ORDER — ALBUTEROL SULFATE 0.83 MG/ML
3 SOLUTION RESPIRATORY (INHALATION) AS NEEDED
OUTPATIENT
Start: 2025-09-16

## 2025-07-22 RX ORDER — EPINEPHRINE 0.3 MG/.3ML
0.3 INJECTION SUBCUTANEOUS EVERY 5 MIN PRN
OUTPATIENT
Start: 2025-09-16

## 2025-07-22 RX ORDER — ALBUTEROL SULFATE 0.83 MG/ML
3 SOLUTION RESPIRATORY (INHALATION) AS NEEDED
Status: DISCONTINUED | OUTPATIENT
Start: 2025-07-22 | End: 2025-07-22 | Stop reason: HOSPADM

## 2025-07-22 RX ORDER — DIPHENHYDRAMINE HYDROCHLORIDE 50 MG/ML
50 INJECTION, SOLUTION INTRAMUSCULAR; INTRAVENOUS AS NEEDED
OUTPATIENT
Start: 2025-09-16

## 2025-07-22 RX ORDER — FAMOTIDINE 10 MG/ML
20 INJECTION, SOLUTION INTRAVENOUS ONCE AS NEEDED
Status: DISCONTINUED | OUTPATIENT
Start: 2025-07-22 | End: 2025-07-22 | Stop reason: HOSPADM

## 2025-07-22 RX ADMIN — LEUPROLIDE ACETATE 22.5 MG: 22.5 INJECTION, SUSPENSION, EXTENDED RELEASE SUBCUTANEOUS at 11:49

## 2025-07-22 ASSESSMENT — PAIN SCALES - GENERAL: PAINLEVEL_OUTOF10: 2

## 2025-07-24 ENCOUNTER — HOSPITAL ENCOUNTER (OUTPATIENT)
Dept: RADIOLOGY | Facility: HOSPITAL | Age: 70
Discharge: HOME | End: 2025-07-24
Payer: COMMERCIAL

## 2025-07-24 ENCOUNTER — HOSPITAL ENCOUNTER (OUTPATIENT)
Facility: HOSPITAL | Age: 70
Setting detail: OUTPATIENT SURGERY
End: 2025-07-24
Attending: SURGERY | Admitting: SURGERY

## 2025-07-24 ENCOUNTER — APPOINTMENT (OUTPATIENT)
Dept: UROLOGY | Facility: CLINIC | Age: 70
End: 2025-07-24
Payer: COMMERCIAL

## 2025-07-24 DIAGNOSIS — N20.0 KIDNEY CALCULI: Primary | ICD-10-CM

## 2025-07-24 DIAGNOSIS — N20.0 RENAL CALCULI: ICD-10-CM

## 2025-07-24 PROCEDURE — 99024 POSTOP FOLLOW-UP VISIT: CPT | Performed by: SURGERY

## 2025-07-24 PROCEDURE — 1125F AMNT PAIN NOTED PAIN PRSNT: CPT | Performed by: SURGERY

## 2025-07-24 PROCEDURE — 74018 RADEX ABDOMEN 1 VIEW: CPT

## 2025-07-24 PROCEDURE — 1160F RVW MEDS BY RX/DR IN RCRD: CPT | Performed by: SURGERY

## 2025-07-24 PROCEDURE — 1159F MED LIST DOCD IN RCRD: CPT | Performed by: SURGERY

## 2025-07-24 PROCEDURE — 1111F DSCHRG MED/CURRENT MED MERGE: CPT | Performed by: SURGERY

## 2025-07-24 ASSESSMENT — PAIN SCALES - GENERAL: PAINLEVEL_OUTOF10: 2

## 2025-07-24 NOTE — PROGRESS NOTES
Subjective   Patient ID: Paul Bruno is a 70 y.o. male who presents for Follow-up.    HPI  Several weeks ago he underwent cystolitholapaxy and right ESWL.  He previously had bilateral staghorn renal calculi.  He already underwent right PCNL.  He was hospitalized several weeks ago with pain.  Imaging in the ER revealed bilateral obstructing ureteral calculi.  He underwent bilateral stent placement.  He is doing well.  He currently has a catheter in place for retention.  He continues to get treatment for his advanced prostate cancer.            Objective   Physical Exam  Well nourished, thin  Abdomen soft, ND, NT                Assessment/Plan   Problem List Items Addressed This Visit           ICD-10-CM       Genitourinary and Reproductive    Kidney calculi - Primary N20.0    Relevant Orders    Case Request Operating Room: CYSTOSCOPY, WITH LASER LITHOTRIPSY (Completed)        I reviewed his x-ray today.  His stents are in good position.  There is a solitary 1 cm calculus in the right renal pelvis.  I will schedule him for a ureteroscopy and lithotripsy to treat the stone.  After the right side is complete he will ultimately need a PCNL to treat the left side.          Sanket Willis MD 07/24/25 3:47 PM

## 2025-08-05 ENCOUNTER — PRE-ADMISSION TESTING (OUTPATIENT)
Dept: PREADMISSION TESTING | Facility: HOSPITAL | Age: 70
End: 2025-08-05
Payer: COMMERCIAL

## 2025-08-05 VITALS
SYSTOLIC BLOOD PRESSURE: 145 MMHG | DIASTOLIC BLOOD PRESSURE: 83 MMHG | RESPIRATION RATE: 16 BRPM | TEMPERATURE: 98.1 F | WEIGHT: 122 LBS | HEART RATE: 80 BPM | HEIGHT: 74 IN | OXYGEN SATURATION: 96 % | BODY MASS INDEX: 15.66 KG/M2

## 2025-08-05 PROCEDURE — 99204 OFFICE O/P NEW MOD 45 MIN: CPT | Performed by: NURSE PRACTITIONER

## 2025-08-05 ASSESSMENT — PAIN SCALES - GENERAL: PAINLEVEL_OUTOF10: 0 - NO PAIN

## 2025-08-05 ASSESSMENT — PAIN - FUNCTIONAL ASSESSMENT: PAIN_FUNCTIONAL_ASSESSMENT: 0-10

## 2025-08-05 NOTE — PREPROCEDURE INSTRUCTIONS
Medication List            Accurate as of August 5, 2025  8:32 AM. Always use your most recent med list.                amLODIPine 10 mg tablet  Commonly known as: Norvasc  Take 1 tablet (10 mg) by mouth once daily.  Medication Adjustments for Surgery: Take/Use as prescribed     ascorbic acid (vitamin C) 1,000 mg ER tablet  Additional Medication Adjustments for Surgery: Take last dose 7 days before surgery  Notes to patient: If you have not stopped this medication please stop it today      aspirin-acetaminophen-caffeine 250-250-65 mg tablet  Commonly known as: Excedrin Migraine  Additional Medication Adjustments for Surgery: Take last dose 7 days before surgery  Notes to patient: If you have not stopped this medication please stop it today      cyanocobalamin 100 mcg tablet  Commonly known as: Vitamin B-12  Additional Medication Adjustments for Surgery: Take last dose 7 days before surgery  Notes to patient: If you have not stopped this medication please stop it today      ferrous sulfate 325 mg (65 mg elemental) tablet  Medication Adjustments for Surgery: Do Not take on the morning of surgery     MIRALAX ORAL  Medication Adjustments for Surgery: Do Not take on the morning of surgery     multivitamin with minerals tablet  Additional Medication Adjustments for Surgery: Take last dose 7 days before surgery  Notes to patient: If you have not stopped this medication please stop it today      oxyCODONE-acetaminophen 5-325 mg tablet  Commonly known as: Percocet  Take 1 tablet by mouth every 6 hours if needed for severe pain (7 - 10).  Medication Adjustments for Surgery: Take/Use as prescribed     tamsulosin 0.4 mg 24 hr capsule  Commonly known as: Flomax  Take 2 capsules (0.8 mg) by mouth once daily.  Medication Adjustments for Surgery: Take/Use as prescribed     TURMERIC ORAL  Additional Medication Adjustments for Surgery: Take last dose 7 days before surgery  Notes to patient: If you have not stopped this medication  please stop it today      VITAMIN E ORAL  Additional Medication Adjustments for Surgery: Take last dose 7 days before surgery  Notes to patient: If you have not stopped this medication please stop it today             NPO Instructions:     Do not eat any food after midnight the night before your surgery/procedure.  You may have clear liquids until TWO hours before surgery/procedure. This includes water, black tea/coffee, (no milk or cream) apple juice and electrolyte drinks (Gatorade).  You may chew gum up to TWO hours before your surgery/procedure.     Additional Instructions:      Seven/Six Days before Surgery:  Review your medication instructions, stop indicated medications  Five Days before Surgery:  Review your medication instructions, stop indicated medications  Three Days before Surgery:  Review your medication instructions, stop indicated medications  The Day before Surgery:  No smoking or alcohol use 24 hours before surgery  Review your medication instructions, stop indicated medications  You will be contacted regarding the time of your arrival to facility and surgery time  Do not eat any food after Midnight  Day of Surgery:  Review your medication instructions, take indicated medications  If you have diabetes, please check your fasting blood sugar upon awakening.  If fasting blood sugar is <80 mg/dl, drink 100 ml of apple juice, time limit of 2 hours before  You may have clear liquids until TWO hours before surgery/procedure.  This includes water, black tea/coffee, (no milk or cream) apple juice and electrolyte drinks (Gatorade)  You may chew gum up to TWO hours before your surgery/procedure  Wear  comfortable loose fitting clothing  Do not use moisturizers, creams, lotions or perfume  All jewelry and valuables should be left at home     CONTACT SURGEON'S OFFICE IF YOU DEVELOP:  * Fever = 100.4 F   * New respiratory symptoms (e.g. cough, shortness of breath, respiratory distress, sore throat)  * Recent  loss of taste or smell  *Flu like symptoms such as headache, fatigue or gastrointestinal symptoms  * You develop any open sores, shingles, burning or painful urination   AND/OR:  * You no longer wish to have the surgery.  * Any other personal circumstances change that may lead to the need to cancel or defer this surgery.  *You were admitted to any hospital within one week of your planned procedure.     SMOKING:  *Quitting smoking can make a huge difference to your health and recovery from surgery.    *If you need help with quitting, call 9-075-QUIT-NOW.     THE DAY BEFORE SURGERY:  *Do not eat any food after midnight the night before your surgery.   *You may have up to TEN OUNCES of clear liquids until TWO hours before surgery/procedure.. This includes water, black tea/coffee, (no milk or cream) apple juice, and electrolyte drinks (Gatorade). Please avoid clear liquids that are red in color.   *You may chew gum/mints up to TWO hours before your surgery/procedure.     SURGICAL TIME:  *You will be contacted between 2 p.m. and 3 p.m. the business day before your surgery with your arrival time.  *If you haven't received a call by 3pm, call [phone]  *Scheduled surgery times may change and you will be notified if this occurs-check your personal voicemail for any updates.     ON THE MORNING OF SURGERY:  *Wear comfortable, loose fitting clothing.   *Do not use moisturizers, creams, lotions or perfume.  *All jewelry and valuables should be left at home.  *Prosthetic devices such as contact lenses, hearing aids, dentures, eyelash extensions, hairpins and body piercing must be removed before surgery.     BRING WITH YOU:  *Photo ID and insurance card  *Current list of medications and allergies  *Pacemaker/Defibrillator/Heart stent cards  *CPAP machine and mask  *Slings/splints/crutches  *Copy of your complete Advanced Directive/DHPOA-if applicable  *Neurostimulator implant remote     PARKING AND ARRIVAL:  *Check in at  the Main Entrance desk and let them know you are here for surgery.     IF YOU ARE HAVING OUTPATIENT/SAME DAY SURGERY:  *A responsible adult MUST accompany you at the time of discharge and stay with you for 24 hours after your surgery.  *You may NOT drive yourself home after surgery.  *You may use a taxi or ride sharing service ([employer], [employer]) to return home ONLY if you are accompanied by a friend or family member.  *Instructions for resuming your medications will be provided by your surgeon.     Thank you for coming to Pre Admission testing.      If I have prescribed medication please don't forget to  at your pharmacy.      Any questions about today's visit call [phone] and leave a message in the general mailbox.     Patient instructed to ambulate as soon as possible postoperatively to decrease thromboembolic risk.      Thank you for visiting [facility].  If you have any changes to your health condition, please call the surgeons office to alert them and give them details of your symptoms.        Preoperative Fasting Guidelines     Why must I stop eating and drinking near surgery time?  With sedation, food or liquid in your stomach can enter your lungs causing serious complications  Increases nausea and vomiting     When do I need to stop eating and drinking before my surgery?  Do not eat any food after midnight the night before your surgery/procedure.  You may have up to TEN ounces of clear liquid until TWO hours before your surgery/procedure.  This includes water, black tea/coffee, (no milk or cream) apple juice, and electrolyte drinks (Gatorade)  You may chew gum until TWO hours before your surgery/procedure        Additional Instructions:      The Day before Surgery:  -Review your medication instructions, stop indicated medications  -You will be contacted in the evening regarding the time of your arrival to facility and surgery time     Day of Surgery:  -Review your medication  instructions, take indicated medications  -Wear comfortable loose fitting clothing  -Do not use moisturizers, creams, lotions or perfume  -All jewelry and valuables should be left at home                   Preoperative Brain Exercises     What are brain exercises?  A brain exercise is any activity that engages your thinking (cognitive) skills.     What types of activities are considered brain exercises?  Jigsaw puzzles, crossword puzzles, word jumble, memory games, word search, and many more.  Many can be found free online or on your phone via a mobile rogelio.     Why should I do brain exercises before my surgery?  More recent research has shown brain exercise before surgery can lower the risk of postoperative delirium (confusion) which can be especially important for older adults.  Patients who did brain exercises for 5 to 10 minutes/day in the days before surgery, cut their risk of postoperative delirium in half up to 1 week after surgery.                         [facility]     Preoperative Deep Breathing Exercises     Why it is important to do deep breathing exercises before my surgery?  Deep breathing exercises strengthen your breathing muscles.  This helps you to recover after your surgery and decreases the chance of breathing complications.        How are the deep breathing exercises done?  Sit straight with your back supported.  Breathe in deeply and slowly through your nose. Your lower rib cage should expand and your abdomen may move forward.  Hold that breath for 3 to 5 seconds.  Breathe out through pursed lips, slowly and completely.  Rest and repeat 10 times every hour while awake.  Rest longer if you become dizzy or lightheaded.                      [facility]     Preoperative Deep Breathing Exercises     Why it is important to do deep breathing exercises before my surgery?  Deep breathing exercises strengthen your breathing muscles.  This helps you to  recover after your surgery and decreases the chance of breathing complications.        How are the deep breathing exercises done?  Sit straight with your back supported.  Breathe in deeply and slowly through your nose. Your lower rib cage should expand and your abdomen may move forward.  Hold that breath for 3 to 5 seconds.  Breathe out through pursed lips, slowly and completely.  Rest and repeat 10 times every hour while awake.  Rest longer if you become dizzy or lightheaded.        Patient Information: Incentive Spirometer  What is an incentive spirometer?  An incentive spirometer is a device used before and after surgery to “exercise” your lungs.  It helps you to take deeper breaths to expand your lungs.  Below is an example of a basic incentive spirometer.  The device you receive may differ slightly but they all function the same.    Why do I need to use an incentive spirometer?  Using your incentive spirometer prepares your lungs for surgery and helps prevent lung problems after surgery.  How do I use my incentive spirometer?  When you're using your incentive spirometer, make sure to breathe through your mouth. If you breathe through your nose, the incentive spirometer won't work properly. You can hold your nose if you have trouble.  If you feel dizzy at any time, stop and rest. Try again at a later time.  Follow the steps below:  Set up your incentive spirometer, expand the flexible tubing and connect to the outlet.  Sit upright in a chair or bed. Hold the incentive spirometer at eye level.   Put the mouthpiece in your mouth and close your lips tightly around it. Slowly breathe out (exhale) completely.  Breathe in (inhale) slowly through your mouth as deeply as you can. As you take a breath, you will see the piston rise inside the large column. While the piston rises, the indicator should move upwards. It should stay in between the 2 arrows (see Figure).  Try to get the piston as high as you can, while keeping  the indicator between the arrows.   If the indicator doesn't stay between the arrows, you're breathing either too fast or too slow.  When you get it as high as you can, hold your breath for 10 seconds, or as long as possible. While you're holding your breath, the piston will slowly fall to the base of the spirometer.  Once the piston reaches the bottom of the spirometer, breathe out slowly through your mouth. Rest for a few seconds.  Repeat 10 times. Try to get the piston to the same level with each breath.  Repeat every hour while awake  You can carefully clean the outside of the mouthpiece with an alcohol wipe or soap and water.       Patient and Family Education             Ways You Can Help Prevent Blood Clots                    This handout explains some simple things you can do to help prevent blood clots.      Blood clots are blockages that can form in the body's veins. When a blood clot forms in your deep veins, it may be called a deep vein thrombosis, or DVT for short. Blood clots can happen in any part of the body where blood flows, but they are most common in the arms and legs. If a piece of a blood clot breaks free and travels to the lungs, it is called a pulmonary embolus (PE). A PE can be a very serious problem.         Being in the hospital or having surgery can raise your chances of getting a blood clot because you may not be well enough to move around as much as you normally do.         Ways you can help prevent blood clots in the hospital           Wearing SCDs. SCDs stands for Sequential Compression Devices.   SCDs are special sleeves that wrap around your legs  They attach to a pump that fills them with air to gently squeeze your legs every few minutes.   This helps return the blood in your legs to your heart.   SCDs should only be taken off when walking or bathing.   SCDs may not be comfortable, but they can help save your life.                                            Wearing compression  stockings - if your doctor orders them. These special snug fitting stockings gently squeeze your legs to help blood flow.       Walking. Walking helps move the blood in your legs.   If your doctor says it is ok, try walking the halls at least   5 times a day. Ask us to help you get up, so you don't fall.      Taking any blood thinning medicines your doctor orders.        Page 1 of 2            [facility]; 3/23   Ways you can help prevent blood clots at home         Wearing compression stockings - if your doctor orders them. ? Walking - to help move the blood in your legs.       Taking any blood thinning medicines your doctor orders.      Signs of a blood clot or PE        Tell your doctor or nurse know right away if you have of the problems listed below.    If you are at home, seek medical care right away. Call 911 for chest pain or problems breathing.                Signs of a blood clot (DVT) - such as pain,  swelling, redness or warmth in your arm or leg      Signs of a pulmonary embolism (PE) - such as chest pain or feeling short of breath    Preoperative Clearances  -If you are informed by the preadmission testing team that you need clearance for your surgery, please reach out to the provider in question to assisting accommodating obtaining your clearance.   -Please have you provider fax your clearance letter to [phone] if needed.   -A blank clearance letter will be provided to you if indicated.     Anticoagulation  -If you are on oral anticoagulation such as Coumadin, Eliquis, Xarelto, Plavix, Brilinta, Pradaxa; we will need to obtain preoperative anticoagulation instructions from the prescribing provider.   -We will reach out to the prescriber but do encourage you to call their office as well as it will increase the chances of getting the necessary information.   -We will contact you with the instruction once we obtain them.

## 2025-08-05 NOTE — CPM/PAT H&P
[facility]/PAT Evaluation       Name: Paul Bruno (Paul Bruno)  /Age: 1955/70 y.o.     In-Person       Chief Complaint: Kidney calculi     HPI  Patient is an alert and oriented 70 year old male scheduled for a  CYSTOSCOPY, WITH LASER LITHOTRIPSY on 25 with Dr. Willis for  Kidney calculi . PMHX includes prostate cancer. Presents to [facility] today for perioperative risk stratification and optimization.     Medical History[1]    Surgical History[2]    Patient Sexual activity questions deferred to the physician.    Family History[3]    Allergies[4]    Prior to Admission medications   Medication Sig Start Date End Date Taking? Authorizing Provider   amLODIPine (Norvasc) 10 mg tablet Take 1 tablet (10 mg) by mouth once daily.  Patient taking differently: Take 1 tablet (10 mg) by mouth once daily in the evening. 25   Jamal Johnson APRN-CNP   ascorbic acid, vitamin C, 1,000 mg ER tablet Take 1 tablet (1,000 mg) by mouth once daily.    Historical Provider, MD   aspirin-acetaminophen-caffeine (Excedrin Migraine) 250-250-65 mg tablet Take 2 tablets by mouth every 6 hours if needed for headaches or mild pain (1 - 3).    Historical Provider, MD   cyanocobalamin (Vitamin B-12) 100 mcg tablet Take 1 tablet (100 mcg) by mouth once daily.    Historical Provider, MD   ferrous sulfate 325 mg (65 mg elemental) tablet Take 1 tablet by mouth once daily with breakfast.    Historical Provider, MD   multivitamin with minerals tablet Take 1 tablet by mouth once daily.    Historical Provider, MD   oxyCODONE-acetaminophen (Percocet) 5-325 mg tablet Take 1 tablet by mouth every 6 hours if needed for severe pain (7 - 10). 25   Reza Stevens MD   polyethylene glycol 3350 (MIRALAX ORAL) Take by mouth 3 times a day as needed. Maryann    Historical Provider, MD   tamsulosin (Flomax) 0.4 mg 24 hr capsule Take 2 capsules (0.8 mg) by mouth once daily.  Patient taking differently: Take 2 capsules (0.8 mg) by mouth once  "daily in the morning. 5/2/25   Skip Mehta MD   TURMERIC ORAL Take 1,000 mg by mouth once daily.    Historical Provider, MD   vitamin E acetate (VITAMIN E ORAL) Take 1 capsule by mouth once daily. vitamin E 180 mg oral capsule    Historical Provider, MD          Visit Vitals  /83   Pulse 80   Temp 36.7 °C (98.1 °F)   Resp 16   Ht 1.88 m (6' 2\")   Wt 55.3 kg (122 lb)   SpO2 96%   BMI 15.66 kg/m²   Smoking Status Every Day   BSA 1.7 m²          Review of Systems   Constitutional: Negative for chills, decreased appetite, diaphoresis, fever and malaise/fatigue.   Eyes:  Negative for blurred vision and double vision.   Cardiovascular:  Negative for chest pain, claudication, cyanosis, dyspnea on exertion, irregular heartbeat, leg swelling, near-syncope and palpitations.   Respiratory:  Negative for cough, hemoptysis, shortness of breath and wheezing.    Endocrine: Negative for cold intolerance, heat intolerance, polydipsia, polyphagia and polyuria.   Gastrointestinal:  Negative for abdominal pain, constipation, diarrhea, dysphagia, nausea and vomiting.   Genitourinary:  Negative for bladder incontinence, dysuria, hematuria, incomplete emptying, nocturia, frequency, pelvic pain and urgency.   Neurological:  Negative for headaches, light-headedness, paresthesias, sensory change and weakness.   Psychiatric/Behavioral:  Negative for altered mental status.    Musculoskeletal: Negative for myalgias, arthralgias     Vitals and nursing note reviewed.     Physical exam  Constitutional:       Appearance: Normal appearance. He is Underweight.   HENT:      Head: Normocephalic and atraumatic.      Mouth/Throat:      Mouth: Mucous membranes are moist.      Pharynx: Oropharynx is clear.   Eyes:      Extraocular Movements: Extraocular movements intact.      Conjunctiva/sclera: Conjunctivae normal.      Pupils: Pupils are equal, round, and reactive to light.   Cardiovascular:      PMI at left midclavicular line. Normal rate. " Regular rhythm. Normal S1. Normal S2.       Murmurs: There is no murmur.      No gallop.  No click. No rub.       No audible carotid bruit     No lower extremity edema on exam  Pulmonary:      Effort: Pulmonary effort is normal.      Breath sounds: Normal breath sounds.   Abdominal:      General: Abdomen is flat. Bowel sounds are normal.      Palpations: Abdomen is soft and non-tender  Musculoskeletal:      Cervical back: Normal range of motion and neck supple.   Skin:     General: Skin is warm and dry.      Capillary Refill: Capillary refill takes less than 2 seconds.   Neurological:      General: No focal deficit present.      Mental Status: He is alert and oriented to person, place, and time. Mental status is at baseline.   Psychiatric:         Mood and Affect: Mood normal.         Behavior: Behavior normal.         Thought Content: Thought content normal.         Judgment: Judgment normal.     Vitals and nursing note reviewed. Physical exam within normal limits.     Assessment & Plan:    Neuro:  No diagnosis or significant findings on chart review or clinical presentation and evaluation.     HEENT/Airway:  No diagnosis or significant findings on chart review or clinical presentation and evaluation.   STOP-BANG Score-3 points moderate risk for DENICE    Mallampati::  I    TM distance::  >3 FB    Neck ROM::  Full  Dentures-reports upper and lower   Crowns-denies  Implants-denies    Cardiovascular:  HTN on amlodipine  History of Atrial flutter   METS: 9  RCRI: 0 points, 3.9%  risk for postoperative MACE   SANDRITA: 0.25% risk for postoperative MACE  EKG -Normal sinus rhythm  Low voltage QRS  Cannot rule out Anteroseptal infarct (cited on or before 02-JUN-2022)  Abnormal ECG  When compared with ECG of 02-JUN-2022 08:17,  No significant change was found  Confirmed by Torito Colmenares ([id number]) on 5/28/2025 8:09:03 PM    Infrarenal abdominal aortic aneurysm measuring up to 3.5 cm.  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 Heavy aortoiliac  vascular calcifications per CT 7/25    Pulmonary:  No diagnosis or significant findings on chart review or clinical presentation and evaluation.   ARISCAT: <26 points, 1.6% risk of in-hospital postoperative pulmonary complication  PRODIGY: Moderate risk for opioid induced respiratory depression  Smoking History-he quit smoking in 2020 but he does smoke cigars on occasion   Discussed smoking cessation and deep breathing handout given    Renal/Urinary:   Kidney and renal calcui  Prostate cancer with SBR with METS  BPH on flomax   however, the patient is at increased risk of perioperative renal complications secondary to HTN. Preventative measures include BP monitoring, medication compliance, and hydration management.   CMP  Creatinine-0.86  GFR->90    Endocrine:  No diagnosis or significant findings on chart review or clinical presentation and evaluation.     Hematologic/Immunology:  No diagnosis or significant findings on chart review or clinical presentation and evaluation.  The patient is not a Jehovah’s witness and will accept blood and blood products if medically indicated.   History of previous blood transfusions No  CBC  HGB-10.4/33.5  PLTs 657  Caprini Score 6, patient at Low for postoperative DVT. Pt supplied education/VTE handout  Anticoagulation use: No     Gastrointestinal:   No diagnosis or significant findings on chart review or clinical presentation and evaluation.   Recreational drug use: marijuana  Alcohol use none    Infectious disease:   No diagnosis or significant findings on chart review or clinical presentation and evaluation.     Musculoskeletal:   No diagnosis or significant findings on chart review or clinical presentation and evaluation.   JHFRAT score- 5 points. low risk for falls    Anesthesia:  ASA 3 - Patient with moderate systemic disease with functional limitations  Anticipated anesthesia-general  History of General anesthesia-  yes  Complications- No anesthesia complications  No family history of anesthesia complications    Labs & Imaging ordered:  CBC, CMP,   Nickel/metal allergy-negative  Shellfish allergy-negative    Discussed with patient medication instructions, NPO guidelines, and any questions or concerns.     time spent 45 minutes  All resulted testing from PAT was forwarded to the surgeon and the patient's PCP if applicable.           [1]   Past Medical History:  Diagnosis Date    AAA (abdominal aortic aneurysm)     Adverse effect of anesthesia     URINARY RETENTION    BPH (benign prostatic hyperplasia)     Hypertension     Nephrolithiasis     Nephrolithiasis     Personal history of irradiation     RT in 10/27/22, 28 fractions to prostate    Prostate cancer (Multi)     Urinary tract infection    [2]   Past Surgical History:  Procedure Laterality Date    EXTRACORPOREAL SHOCK WAVE LITHOTRIPSY  06/23/2025    PERCUTANEOUS NEPHROSTOMY  05/28/2025    PROSTATE BIOPSY  04/12/2022    TONSILLECTOMY     [3]   Family History  Problem Relation Name Age of Onset    Hypertension Other grandparent    [4] No Known Allergies

## 2025-09-10 ENCOUNTER — APPOINTMENT (OUTPATIENT)
Dept: HEMATOLOGY/ONCOLOGY | Facility: CLINIC | Age: 70
End: 2025-09-10

## 2025-09-15 ENCOUNTER — APPOINTMENT (OUTPATIENT)
Dept: HEMATOLOGY/ONCOLOGY | Facility: HOSPITAL | Age: 70
End: 2025-09-15
Payer: COMMERCIAL

## 2025-10-06 ENCOUNTER — APPOINTMENT (OUTPATIENT)
Dept: UROLOGY | Facility: CLINIC | Age: 70
End: 2025-10-06
Payer: COMMERCIAL

## (undated) DEVICE — COLLECTION BAG, FLUID, NON-STERILE

## (undated) DEVICE — Device

## (undated) DEVICE — SOLUTION, CONTRAST, OMNIPAQUE 350, 50ML, POLYMER BOTTLE

## (undated) DEVICE — SOLUTION, IRRIGATION, USP, SODIUM CHLORIDE 0.9%, 3000 ML, BAG

## (undated) DEVICE — ENDOSCOPE, ASCOPE 4 CYSCO, REVERSE DEFLECTION

## (undated) DEVICE — GLOVE, PROTEXIS PI CLASSIC, SZ-7.5, PF, LF

## (undated) DEVICE — GUIDEWIRE, SOLO PLUS, STIFF, PTFE, 0.038 X 150CM, HYDRO/ STRT TIP

## (undated) DEVICE — GUIDEWIRE, AMPLATZ, SUPER STIFF, .035 FLOPPY

## (undated) DEVICE — HOLMIUM MASTERPULSE HF

## (undated) DEVICE — HOLMIUM 365 FORTEC FIBER

## (undated) DEVICE — TOWEL, SURGICAL, NEURO, O/R, 16 X 26, BLUE, STERILE

## (undated) DEVICE — SOLUTION, IRRIGATION, X RX SODIUM CHL 0.9%, 1000ML BTL

## (undated) DEVICE — IRRIGATION SET, CYSTOSCOPY, F/CONSTANT/INTERMITTENT, 8 GTT/CC, 77 IN

## (undated) DEVICE — BAG, DRAINAGE, CONTINUOUS IRRIGATION, 4L

## (undated) DEVICE — BASKET, STONE 1.9 X 120 SKYLITE TIPLESS 12MM BASKET

## (undated) DEVICE — PROBE, LITHOCLAST, TRILOGY, 3.9MM X 44CM

## (undated) DEVICE — SYRINGE, 10 CC, LUER LOCK

## (undated) DEVICE — MARKER, SURGICAL, SKIN, REG TIP, W/ RULER & LABELS

## (undated) DEVICE — SPONGE, GAUZE, AVANT, STERILE, NONWOVEN, 4PLY, 4 X 4, STANDARD

## (undated) DEVICE — DRAPE, C-ARM IMAGE

## (undated) DEVICE — BLANKET, LOWER BODY, VHA PLUS, ADULT

## (undated) DEVICE — EXTENSION SET, DRAINAGE, 71 IN, W/SLIDE C

## (undated) DEVICE — UROVAC BLADDER EVACUATOR

## (undated) DEVICE — SUTURE, VICRYL, 3-0, 27 IN, SH

## (undated) DEVICE — GUIDEWIRE, DUAL SENSOR, .035 X 150 STRAIGHT,  3CM

## (undated) DEVICE — DRAPE, CRANIOTOMY, NEUROLOGY, 122 X 74 X 134IN

## (undated) DEVICE — CATHETER, URETHRAL, FOLEY, 2 WAY, BARDEX LUBRICATH, SHORT LENGTH, 20 FR, 5 CC, LATEX

## (undated) DEVICE — DRAPE, SHEET, 17 X 23 IN

## (undated) DEVICE — 18FR, 2-WAY, 5CC BARDEX LUBRICATH LATEX FOLEY CATHETER, TIEMANN MODEL, MEDIUM OLIVE COUDE TIP, SINGLE DRAINAGE EYE

## (undated) DEVICE — GLOVE, SURGICAL, PROTEXIS PI ORTHO, 8.0, PF, LF

## (undated) DEVICE — CATHETER, X-FORCE N30, BALLOON DILATION, 10MM X 15CM